# Patient Record
Sex: FEMALE | Race: WHITE | NOT HISPANIC OR LATINO | Employment: OTHER | URBAN - METROPOLITAN AREA
[De-identification: names, ages, dates, MRNs, and addresses within clinical notes are randomized per-mention and may not be internally consistent; named-entity substitution may affect disease eponyms.]

---

## 2017-02-24 ENCOUNTER — APPOINTMENT (OUTPATIENT)
Dept: LAB | Facility: CLINIC | Age: 82
End: 2017-02-24
Payer: MEDICARE

## 2017-02-24 ENCOUNTER — TRANSCRIBE ORDERS (OUTPATIENT)
Dept: LAB | Facility: CLINIC | Age: 82
End: 2017-02-24

## 2017-02-24 DIAGNOSIS — I10 ESSENTIAL HYPERTENSION, MALIGNANT: Primary | ICD-10-CM

## 2017-02-24 LAB
ALBUMIN SERPL BCP-MCNC: 3.4 G/DL (ref 3.5–5)
ALP SERPL-CCNC: 83 U/L (ref 46–116)
ALT SERPL W P-5'-P-CCNC: 23 U/L (ref 12–78)
ANION GAP SERPL CALCULATED.3IONS-SCNC: 7 MMOL/L (ref 4–13)
AST SERPL W P-5'-P-CCNC: 17 U/L (ref 5–45)
BILIRUB SERPL-MCNC: 0.73 MG/DL (ref 0.2–1)
BUN SERPL-MCNC: 24 MG/DL (ref 5–25)
CALCIUM SERPL-MCNC: 9.9 MG/DL (ref 8.3–10.1)
CHLORIDE SERPL-SCNC: 107 MMOL/L (ref 100–108)
CHOLEST SERPL-MCNC: 142 MG/DL (ref 50–200)
CO2 SERPL-SCNC: 28 MMOL/L (ref 21–32)
CREAT SERPL-MCNC: 0.77 MG/DL (ref 0.6–1.3)
GFR SERPL CREATININE-BSD FRML MDRD: >60 ML/MIN/1.73SQ M
GLUCOSE SERPL-MCNC: 91 MG/DL (ref 65–140)
HDLC SERPL-MCNC: 66 MG/DL (ref 40–60)
LDLC SERPL CALC-MCNC: 63 MG/DL (ref 0–100)
POTASSIUM SERPL-SCNC: 3.8 MMOL/L (ref 3.5–5.3)
PROT SERPL-MCNC: 6.6 G/DL (ref 6.4–8.2)
SODIUM SERPL-SCNC: 142 MMOL/L (ref 136–145)
TRIGL SERPL-MCNC: 65 MG/DL

## 2017-02-24 PROCEDURE — 80061 LIPID PANEL: CPT | Performed by: INTERNAL MEDICINE

## 2017-02-24 PROCEDURE — 36415 COLL VENOUS BLD VENIPUNCTURE: CPT | Performed by: INTERNAL MEDICINE

## 2017-02-24 PROCEDURE — 80053 COMPREHEN METABOLIC PANEL: CPT | Performed by: INTERNAL MEDICINE

## 2017-03-09 ENCOUNTER — ALLSCRIPTS OFFICE VISIT (OUTPATIENT)
Dept: OTHER | Facility: OTHER | Age: 82
End: 2017-03-09

## 2017-03-09 ENCOUNTER — TRANSCRIBE ORDERS (OUTPATIENT)
Dept: ADMINISTRATIVE | Facility: HOSPITAL | Age: 82
End: 2017-03-09

## 2017-03-09 DIAGNOSIS — I48.91 ATRIAL FIBRILLATION, UNSPECIFIED TYPE (HCC): Primary | ICD-10-CM

## 2017-09-19 ENCOUNTER — GENERIC CONVERSION - ENCOUNTER (OUTPATIENT)
Dept: OTHER | Facility: OTHER | Age: 82
End: 2017-09-19

## 2017-09-19 ENCOUNTER — HOSPITAL ENCOUNTER (OUTPATIENT)
Dept: NON INVASIVE DIAGNOSTICS | Facility: HOSPITAL | Age: 82
Discharge: HOME | End: 2017-09-19
Attending: INTERNAL MEDICINE
Payer: MEDICARE

## 2017-09-19 DIAGNOSIS — I48.91 ATRIAL FIBRILLATION, UNSPECIFIED TYPE (HCC): ICD-10-CM

## 2017-09-19 PROCEDURE — 93225 XTRNL ECG REC<48 HRS REC: CPT

## 2017-09-19 PROCEDURE — 93226 XTRNL ECG REC<48 HR SCAN A/R: CPT

## 2017-09-27 ENCOUNTER — TRANSCRIBE ORDERS (OUTPATIENT)
Dept: ADMINISTRATIVE | Facility: HOSPITAL | Age: 82
End: 2017-09-27

## 2017-09-29 ENCOUNTER — TRANSCRIBE ORDERS (OUTPATIENT)
Dept: ADMINISTRATIVE | Facility: HOSPITAL | Age: 82
End: 2017-09-29

## 2017-09-29 ENCOUNTER — APPOINTMENT (OUTPATIENT)
Dept: LAB | Facility: HOSPITAL | Age: 82
End: 2017-09-29
Attending: INTERNAL MEDICINE
Payer: MEDICARE

## 2017-09-29 DIAGNOSIS — E78.2 MIXED HYPERLIPIDEMIA: Primary | ICD-10-CM

## 2017-09-29 LAB
ALBUMIN SERPL BCP-MCNC: 3.3 G/DL (ref 3.5–5)
ALP SERPL-CCNC: 82 U/L (ref 46–116)
ALT SERPL W P-5'-P-CCNC: 29 U/L (ref 12–78)
ANION GAP SERPL CALCULATED.3IONS-SCNC: 9 MMOL/L (ref 4–13)
AST SERPL W P-5'-P-CCNC: 26 U/L (ref 5–45)
BILIRUB SERPL-MCNC: 1 MG/DL (ref 0.2–1)
BUN SERPL-MCNC: 29 MG/DL (ref 5–25)
CALCIUM SERPL-MCNC: 9.8 MG/DL (ref 8.3–10.1)
CHLORIDE SERPL-SCNC: 105 MMOL/L (ref 100–108)
CHOLEST SERPL-MCNC: 130 MG/DL (ref 50–200)
CO2 SERPL-SCNC: 27 MMOL/L (ref 21–32)
CREAT SERPL-MCNC: 0.81 MG/DL (ref 0.6–1.3)
GFR SERPL CREATININE-BSD FRML MDRD: 68 ML/MIN/1.73SQ M
GLUCOSE P FAST SERPL-MCNC: 90 MG/DL (ref 65–99)
HDLC SERPL-MCNC: 65 MG/DL (ref 40–60)
LDLC SERPL CALC-MCNC: 49 MG/DL (ref 0–100)
POTASSIUM SERPL-SCNC: 3.2 MMOL/L (ref 3.5–5.3)
PROT SERPL-MCNC: 6.5 G/DL (ref 6.4–8.2)
SODIUM SERPL-SCNC: 141 MMOL/L (ref 136–145)
TRIGL SERPL-MCNC: 78 MG/DL

## 2017-09-29 PROCEDURE — 80053 COMPREHEN METABOLIC PANEL: CPT | Performed by: INTERNAL MEDICINE

## 2017-09-29 PROCEDURE — 80061 LIPID PANEL: CPT | Performed by: INTERNAL MEDICINE

## 2017-09-29 PROCEDURE — 36415 COLL VENOUS BLD VENIPUNCTURE: CPT | Performed by: INTERNAL MEDICINE

## 2017-10-05 ENCOUNTER — TRANSCRIBE ORDERS (OUTPATIENT)
Dept: ADMINISTRATIVE | Facility: HOSPITAL | Age: 82
End: 2017-10-05

## 2017-10-05 ENCOUNTER — ALLSCRIPTS OFFICE VISIT (OUTPATIENT)
Dept: OTHER | Facility: OTHER | Age: 82
End: 2017-10-05

## 2017-10-05 ENCOUNTER — GENERIC CONVERSION - ENCOUNTER (OUTPATIENT)
Dept: OTHER | Facility: OTHER | Age: 82
End: 2017-10-05

## 2017-10-05 DIAGNOSIS — I35.0 NODULAR CALCIFIC AORTIC VALVE STENOSIS: Primary | ICD-10-CM

## 2017-10-05 DIAGNOSIS — I48.91 ATRIAL FIBRILLATION, UNSPECIFIED TYPE (HCC): ICD-10-CM

## 2017-10-05 DIAGNOSIS — I10 ESSENTIAL HYPERTENSION, MALIGNANT: ICD-10-CM

## 2017-10-11 ENCOUNTER — GENERIC CONVERSION - ENCOUNTER (OUTPATIENT)
Dept: OTHER | Facility: OTHER | Age: 82
End: 2017-10-11

## 2017-10-11 ENCOUNTER — HOSPITAL ENCOUNTER (OUTPATIENT)
Dept: NON INVASIVE DIAGNOSTICS | Facility: HOSPITAL | Age: 82
Discharge: HOME | End: 2017-10-11
Attending: INTERNAL MEDICINE
Payer: MEDICARE

## 2017-10-11 DIAGNOSIS — I10 ESSENTIAL HYPERTENSION, MALIGNANT: ICD-10-CM

## 2017-10-11 DIAGNOSIS — I35.0 NODULAR CALCIFIC AORTIC VALVE STENOSIS: ICD-10-CM

## 2017-10-11 DIAGNOSIS — I48.91 ATRIAL FIBRILLATION, UNSPECIFIED TYPE (HCC): ICD-10-CM

## 2017-10-11 PROCEDURE — 93306 TTE W/DOPPLER COMPLETE: CPT

## 2017-10-18 ENCOUNTER — GENERIC CONVERSION - ENCOUNTER (OUTPATIENT)
Dept: OTHER | Facility: OTHER | Age: 82
End: 2017-10-18

## 2017-10-19 ENCOUNTER — GENERIC CONVERSION - ENCOUNTER (OUTPATIENT)
Dept: OTHER | Facility: OTHER | Age: 82
End: 2017-10-19

## 2017-11-30 ENCOUNTER — ALLSCRIPTS OFFICE VISIT (OUTPATIENT)
Dept: OTHER | Facility: OTHER | Age: 82
End: 2017-11-30

## 2017-12-05 ENCOUNTER — TRANSCRIBE ORDERS (OUTPATIENT)
Dept: ADMINISTRATIVE | Facility: HOSPITAL | Age: 82
End: 2017-12-05

## 2017-12-05 DIAGNOSIS — R41.3 MEMORY LOSS: Primary | ICD-10-CM

## 2017-12-05 NOTE — PROGRESS NOTES
Assessment    1  Benign essential hypertension (401 1) (I10)   2  Hyperlipidemia, mixed (272 2) (E78 2)   3  Memory loss (780 93) (R41 3)    Plan  Benign essential hypertension    · AmLODIPine Besylate 5 MG Oral Tablet; Take 1 tablet daily   · Lisinopril-Hydrochlorothiazide 20-12 5 MG Oral Tablet; take 1 tablet twice a day   · Metoprolol Tartrate 25 MG Oral Tablet; take 1 tablet twice a day  Hyperlipidemia, mixed    · Atorvastatin Calcium 20 MG Oral Tablet (Lipitor); Take 1 tablet daily  Memory loss    · Eduard - Christiano Méndez PSYD  Neuropsychology Co-Management  *  Status: Need  Information - Financial Authorization  Requested for: [id number]  Care Summary provided  : Yes   · * MRI BRAIN WO CONTRAST; Status:Need Information - Financial Authorization; Requested for:[id number];    · Follow-up visit in 3 months Evaluation and Treatment  Follow-up  Status: Complete -  Scheduling  Done: 07IYW5345 03:34PM    Discussion/Summary    See MMSE scanned in chart  Scored 27/30 consistent with mild cognitive impairment  Discussed medication but she defers at this time  Will check MRI  Referred to neuropsych for memory testing  Advised increased physical activity and word games/reading etc   Advised strongly to stop driving as she is having to leave notes for herself in her car to tell her where she is trying to go  She was understandably upset about this  Follow up in 3 months  Chief Complaint  Memory issues, niece at visit      History of Present Illness  Here for follow up and for memory issues  Here with her niece  States she is having periods of forgetfulness and confusion  Confusion is worse as the day goes on  She has been continuing to drive and has forgotten where she is going when she went to [facility]  States she now leaves a note on her table to say where she went and puts a note on her carseat to remind her where she is going   Has assistance with cleaning but still doing her own check/bill paying and cooking, other ADL's  Is very active with seniors groups and is upset about the possibility of not being able to drive  The patient states her hyperlipidemia has been under good control since the last visit  She has no significant interval events  Symptoms: The patient is currently asymptomatic  Associated symptoms include memory loss  Medications: the patient is adherent with her medication regimen  She denies medication side effects  The patient is doing well with her hyperlipidemia goals  The patient presents for follow-up of essential hypertension  The patient states she has been doing well with her blood pressure control since the last visit  She has no comorbid illnesses  She has no significant interval events  Symptoms: The patient is currently asymptomatic  Medications: the patient is adherent with her medication regimen  (but has only been taking lisinopril HCT once per day instead of twice a day as ordered) She denies medication side effects  Disease Management: the patient is doing well with her blood pressure goals  Review of Systems    Constitutional: No fever, no chills, feels well, no tiredness, no recent weight gain or weight loss  Cardiovascular: No complaints of slow heart rate, no fast heart rate, no chest pain, no palpitations, no leg claudication, no lower extremity edema  Respiratory: No complaints of shortness of breath, no wheezing, no cough, no SOB on exertion, no orthopnea, no PND  Neurological: as noted in HPI  Active Problems    1  Adult BMI 30 0-30 9 kg/sq m (V85 30) (Z68 30)   2  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)   3  Aortic stenosis, mild (424 1) (I35 0)   4  Atrial fibrillation (427 31) (I48 91)   5  Benign essential hypertension (401 1) (I10)   6  Encounter for screening for cardiovascular disorders (V81 2) (Z13 6)   7  Endometrial hyperplasia (621 30) (N85 00)   8  Hyperlipidemia, mixed (272 2) (E78 2)   9  Hypokalemia (276 8) (E87 6)   10   Kidney carcinoma (189 0) (C64 9)   11  Kidney cysts (753 10) (N28 1)   12  Need for immunization against influenza (V04 81) (Z23)   13  Obesity, 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) (E66 9)   14  Screening for diabetes mellitus (DM) (V77 1) (Z13 1)   15  Screening for neurological condition (V80 09) (Z13 89)   16  Sick sinus syndrome (427 81) (I49 5)   17  Sinus bradycardia (427 89) (R00 1)    Past Medical History    1  Acute bronchitis (466 0) (J20 9)   2  History of urinary tract infection (V13 02) (Z87 440)   3  Screening for genitourinary condition (V81 6) (Z13 89)   4  History of Screening for genitourinary condition (V81 6) (Z13 89)    The active problems and past medical history were reviewed and updated today  Surgical History    1  History of Anterior Gastropexy For Hiatal Hernia   2  History of Appendectomy   3  History of Cataract Surgery   4  History of Cataract Surgery   5  History of Cryosurgical Ablation Of Renal Mass Lesion(S)   6  History of Tonsillectomy    The surgical history was reviewed and updated today  Family History  Mother    1  Family history of Dementia  Brother    2  Family history of Arteriosclerotic Cardiovascular Disease (ASCVD)    The family history was reviewed and updated today  Social History    · Never a smoker   · No alcohol use  The social history was reviewed and updated today  The social history was reviewed and is unchanged  Current Meds   1  AmLODIPine Besylate 5 MG Oral Tablet; Take 1 tablet daily; Therapy: 60ATK5375 to (Jerrica Toledo)  Requested for: 09UWP8147; Last   Rx:27Nov2017 Ordered   2  Atorvastatin Calcium 20 MG Oral Tablet; Take 1 tablet daily  Requested for: 93XKO3285;   Last Rx:09Mar2017 Ordered   3  Eliquis 5 MG Oral Tablet; one in am and one in pm  Requested for: 37Foj8405; Last   Rx:17Nov2017 Ordered   4  Klor-Con M20 20 MEQ Oral Tablet Extended Release; TAKE 1 TABLET DAILY;    Therapy: 04CXS9730 to ([address] )  Requested for: 44NVQ7451; Last Rx: 59RVS8948 Ordered   5  Lisinopril-Hydrochlorothiazide 20-12 5 MG Oral Tablet; TAKE 1 TABLET TWICE A DAY  NEEDS APPOINTMENT; Therapy: 29UBW2865 to (Evaluate:29Xji6496)  Requested for: 30GSC5589; Last   Rx:03Lmo7657 Ordered   6  Metoprolol Tartrate 25 MG Oral Tablet; take 1 tablet twice a day; Therapy: 26NIZ1709 to (Evaluate:17Mar2018)  Requested for: 22Mar2017; Last   Rx:22Mar2017 Ordered    The medication list was reviewed and updated today  Allergies    1  No Known Drug Allergies    Vitals  Vital Signs    Recorded: 09HZA5123 02:34PM   Temperature 96 6 F   Heart Rate 78   Respiration 16   Systolic 578   Diastolic 90   Height 5 ft 4 5 in   Weight 167 lb    BMI Calculated 28 22   BSA Calculated 1 82     Physical Exam    Constitutional   General appearance: No acute distress, well appearing and well nourished  Ears, Nose, Mouth, and Throat   Oropharynx: Normal with no erythema, edema, exudate or lesions  Pulmonary   Respiratory effort: No increased work of breathing or signs of respiratory distress  Auscultation of lungs: Clear to auscultation  Cardiovascular   Auscultation of heart: Abnormal   The rhythm was irregularly irregular  Examination of extremities for edema and/or varicosities: Abnormal   varicosities noted bilaterally  Carotid pulses: Normal     Abdomen   Abdomen: Non-tender, no masses  Liver and spleen: No hepatomegaly or splenomegaly  Lymphatic   Palpation of lymph nodes in neck: No lymphadenopathy  Results/Data  Falls Risk Assessment (Dx Z13 89 Screen for Neurologic Disorder) 45GHB2399 02:37PM User, [place]     Test Name Result Flag Reference   Falls Risk      No falls in the past year     PHQ-2 Adult Depression Screening 01YPM9165 02:37PM User, Ahs     Test Name Result Flag Reference   PHQ-2 Adult Depression Score 0     Over the last two weeks, how often have you been bothered by any of the following problems?   Little interest or pleasure in doing things: Not at all - 0  Feeling down, depressed, or hopeless: Not at all - 0   PHQ-2 Adult Depression Screening Negative         Future Appointments    Date/Time Provider Specialty Site   03/01/2018 12:30 PM WIN Henderson   [address] [place] [address]   Electronically signed by : WIN Gomez ; Nov 30 2017  3:50PM EST                       (Author)

## 2017-12-13 ENCOUNTER — HOSPITAL ENCOUNTER (OUTPATIENT)
Dept: RADIOLOGY | Facility: HOSPITAL | Age: 82
Discharge: HOME | End: 2017-12-13
Attending: INTERNAL MEDICINE
Payer: MEDICARE

## 2017-12-13 ENCOUNTER — GENERIC CONVERSION - ENCOUNTER (OUTPATIENT)
Dept: OTHER | Facility: OTHER | Age: 82
End: 2017-12-13

## 2017-12-13 DIAGNOSIS — R41.3 MEMORY LOSS: ICD-10-CM

## 2017-12-13 PROCEDURE — 70551 MRI BRAIN STEM W/O DYE: CPT

## 2017-12-14 ENCOUNTER — GENERIC CONVERSION - ENCOUNTER (OUTPATIENT)
Dept: OTHER | Facility: OTHER | Age: 82
End: 2017-12-14

## 2018-01-09 NOTE — PROCEDURES
Procedures by Garrick Ribeiro DO at 2017  11:59 PM      Author:  Garrick Ribeiro DO Service:  Cardiology Author Type:  Physician    Filed:  2017  9:22 AM Date of Service:  2017 11:59 PM Status:  Signed    :  Garrick Ribeiro DO (Physician)        Pre-procedure Diagnoses:       1  Shortness of breath [R06 02]                Procedures:       1  HOLTER MONITOR - 24 HOUR [CAR02 (Custom)]                 PROCEDURE: Holter monitor - 24 hour      PT NAME: César Jacobsen  : 1935  AGE: 80 y o  GENDER: female  MRN: [id number]      INDICATIONS:   24 hour Holter monitor was performed 2017 for evaluation of shortness of breath  PROCEDURE:    The patient was in atrial fibrillation for the duration of the study with an average heart rate of 82 beats per minute with a minimum heart rate of 43 beats per minute and maximal heart rate of 169 beats per minute  Patient was noted to be tachycardic  from approximately 5:45 a m  to 7:00 a m on 2nd day of monitoring  Ventricular ectopic activity consisted of 0 total beats, which comprises 0% of total beats  There were no significant events recorded in the patient's home diary  IMPRESSION:  1  Abnormal 24 hour holter tracing report  Patient was in atrial fibrillation for the duration of the study with an average ventricular rate  of 82 beats per minute  With episode of tachycardia 6-7 am on day 2 of monitoring  2  There were no episodes of Ventricular ectopic activity  3  There were no significant events recorded in patient's home diary      Interpreted by: Garrick Ribeiro [facility], [facility]             Received for:Salinas Cruz DO  Sep 27 2017  3:32PM [facility] Standard Time

## 2018-01-12 VITALS
WEIGHT: 167 LBS | HEIGHT: 65 IN | HEART RATE: 78 BPM | DIASTOLIC BLOOD PRESSURE: 90 MMHG | BODY MASS INDEX: 27.82 KG/M2 | TEMPERATURE: 96.6 F | SYSTOLIC BLOOD PRESSURE: 140 MMHG | RESPIRATION RATE: 16 BRPM

## 2018-01-12 NOTE — PROGRESS NOTES
Assessment    1  Benign essential hypertension (401 1) (I10)   2  Hyperlipidemia, mixed (272 2) (E78 2)   3  Memory loss (780 93) (R41 3)   4  Encounter for preventive health examination (V70 0) (Z00 00)    Plan  Benign essential hypertension    · AmLODIPine Besylate 5 MG Oral Tablet; Take 1 tablet daily   · Lisinopril-Hydrochlorothiazide 20-12 5 MG Oral Tablet; take 1 tablet twice a day   · Metoprolol Tartrate 25 MG Oral Tablet; take 1 tablet twice a day  Hyperlipidemia, mixed    · Atorvastatin Calcium 20 MG Oral Tablet (Lipitor); Take 1 tablet daily  Memory loss    · 1 - Christiano Méndez PSYD  Neuropsychology Co-Management  *  Status: Need  Information - Financial Authorization  Requested for: [id number]  Care Summary provided  : Yes   · * MRI BRAIN WO CONTRAST; Status:Need Information - Financial Authorization; Requested for:[id number];    · Follow-up visit in 3 months Evaluation and Treatment  Follow-up  Status: Complete -  Scheduling  Done: 75ETD4882 03:34PM    Discussion/Summary    PPP given  Impression: Subsequent Annual Wellness Visit, with preventive exam as well as age and risk appropriate counseling completed  Cardiovascular screening and counseling: the risks and benefits of screening were discussed, screening is current, counseling was given on maintaining a healthy diet, counseling was given on maintaining a healthy weight, counseling was given on ways to improve cholesterol and counseling was given on ways to improve blood pressure  Diabetes screening and counseling: the risks and benefits of screening were discussed and screening is current  Colorectal cancer screening and counseling: the risks and benefits of screening were discussed, screening not indicated and counseling was given on ways to eat a high fiber diet  Breast cancer screening and counseling: screening not indicated     Osteoporosis screening and counseling: the patient declines screening, counseling was given on obtaining adequate amounts of calcium and vitamin D on a daily basis and counseling was given on the importance of regular weightbearing exercise  Abdominal aortic aneurysm screening and counseling: screening not indicated  Glaucoma screening and counseling: screening is current  HIV screening and counseling: screening not indicated  Immunizations: influenza vaccine is up to date this year, the patient declines the pneumococcal vaccination, hepatitis B vaccination series is not indicated at this time due to the patient's low risk of deisy the disease, the patient declines the Zostavax vaccine and the patient declines the Td vaccine  Advance Directive Planning: complete and up to date  Patient Discussion: plan discussed with the patient, follow-up visit needed in one year  Chief Complaint  AWV      Advance Directives  Advance Directive [facility]:   The patient is not in agreement to receive the Advance Care Planning service    YES - Patient has an advance health care directive  in patient's home  History of Present Illness  The patient is being seen for the subsequent annual wellness visit  Medicare Screening and Risk Factors   Hospitalizations: no previous hospitalizations  Medicare Screening Tests Risk Questions   Drug and Alcohol Use: The patient is a former cigarette smoker and quit smoking 30 years ago  The patient reports never drinking alcohol  She has never used illicit drugs  Diet and Physical Activity: Current diet includes well balanced meals  The patient does not exercise  Mood Disorder and Cognitive Impairment Screening: She denies feeling down, depressed, or hopeless over the past two weeks  She denies feeling little interest or pleasure in doing things over the past two weeks  Functional Ability/Level of Safety: Hearing is significantly decreased  She reports hearing difficulties  She uses a hearing aid   The patient is currently able to do activities of daily living without limitations  Activities of daily living details: transportation help needed, needs help shopping, needs help doing housework, needs help doing laundry and needs help managing medications, but does not need help using the phone, no meal preparation help needed and does not need help managing money  Fall risk factors: The patient fell 0 times in the past 12 months  Advance Directives: Advance directives: living will and durable power of  for health care directives  Co-Managers and Medical Equipment/Suppliers: See Patient Care Team      Patient Care Team    Care Team Member Role Specialty Office Number   Gideon Curry [facility]  Neurology [phone]   Cindy Grade M D  Family Medicine [phone]   Vilma Severe M D  Cardiology [phone]     Active Problems    1  Adult BMI 30 0-30 9 kg/sq m (V85 30) (Z68 30)   2  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)   3  Aortic stenosis, mild (424 1) (I35 0)   4  Atrial fibrillation (427 31) (I48 91)   5  Benign essential hypertension (401 1) (I10)   6  Encounter for screening for cardiovascular disorders (V81 2) (Z13 6)   7  Endometrial hyperplasia (621 30) (N85 00)   8  Hyperlipidemia, mixed (272 2) (E78 2)   9  Hypokalemia (276 8) (E87 6)   10  Kidney carcinoma (189 0) (C64 9)   11  Kidney cysts (753 10) (N28 1)   12  Memory loss (780 93) (R41 3)   13  Need for immunization against influenza (V04 81) (Z23)   14  Obesity, 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) (E66 9)   15  Screening for diabetes mellitus (DM) (V77 1) (Z13 1)   16  Screening for neurological condition (V80 09) (Z13 89)   17  Sick sinus syndrome (427 81) (I49 5)   18  Sinus bradycardia (427 89) (R00 1)    Past Medical History    1  Acute bronchitis (466 0) (J20 9)   2  History of urinary tract infection (V13 02) (Z87 440)   3  Screening for genitourinary condition (V81 6) (Z13 89)   4   History of Screening for genitourinary condition (V81 6) (Z13 89)    The active problems and past medical history were reviewed and updated today  Surgical History    1  History of Anterior Gastropexy For Hiatal Hernia   2  History of Appendectomy   3  History of Cataract Surgery   4  History of Cataract Surgery   5  History of Cryosurgical Ablation Of Renal Mass Lesion(S)   6  History of Tonsillectomy    The surgical history was reviewed and updated today  Family History  Mother    1  Family history of Dementia  Brother    2  Family history of Arteriosclerotic Cardiovascular Disease (ASCVD)    The family history was reviewed and updated today  Social History    · Never a smoker   · No alcohol use  The social history was reviewed and updated today  The social history was reviewed and is unchanged  Current Meds   1  AmLODIPine Besylate 5 MG Oral Tablet; Take 1 tablet daily; Therapy: 52XXU9017 to (Theta Paola)  Requested for: 13JBY2171; Last   Rx:27Nov2017 Ordered   2  Atorvastatin Calcium 20 MG Oral Tablet; Take 1 tablet daily  Requested for: 83AOS5547;   Last Rx:09Mar2017 Ordered   3  Eliquis 5 MG Oral Tablet; one in am and one in pm  Requested for: 23Nov2017; Last   Rx:17Nov2017 Ordered   4  Klor-Con M20 20 MEQ Oral Tablet Extended Release; TAKE 1 TABLET DAILY; Therapy: 05TSB2435 to ([address] )  Requested for: 87CUN5518; Last   Rx:10Uic0051 Ordered   5  Lisinopril-Hydrochlorothiazide 20-12 5 MG Oral Tablet; TAKE 1 TABLET TWICE A DAY  NEEDS APPOINTMENT; Therapy: 66ZLE8194 to (Evaluate:59Iwo6657)  Requested for: 19LGF7588; Last   Rx:12Nov2017 Ordered   6  Metoprolol Tartrate 25 MG Oral Tablet; take 1 tablet twice a day; Therapy: 70TBH2647 to (Evaluate:17Mar2018)  Requested for: 22Mar2017; Last   Rx:22Mar2017 Ordered    The medication list was reviewed and updated today  Allergies    1   No Known Drug Allergies    Immunizations  Influenza --- [place]: [id number] Drop: [id number] Safe: 23-Sep-2015; Series4:  16-Sep-2016; Series5: 16-Aug-2017   PPSV --- [place]: Temporarily Deferred: Pt refuses     Vitals  Signs   Recorded: 49QWE9436 02:34PM   Temperature: 96 6 F  Heart Rate: 78  Respiration: 16  Systolic: 319  Diastolic: 90  Height: 5 ft 4 5 in  Weight: 167 lb   BMI Calculated: 28 22  BSA Calculated: 1 82    Results/Data  Falls Risk Assessment (Dx Z13 89 Screen for Neurologic Disorder) 42YPR9746 02:37PM User, Ahs     Test Name Result Flag Reference   Falls Risk      No falls in the past year     PHQ-2 Adult Depression Screening 47EAO4958 02:37PM User, Ahs     Test Name Result Flag Reference   PHQ-2 Adult Depression Score 0     Over the last two weeks, how often have you been bothered by any of the following problems? Little interest or pleasure in doing things: Not at all - 0  Feeling down, depressed, or hopeless: Not at all - 0   PHQ-2 Adult Depression Screening Negative         Future Appointments    Date/Time Provider Specialty Site   03/01/2018 12:30 PM WIN Banks   [address] [place] [address]     Signatures   Electronically signed by : IWN Frausto ; Nov 30 2017  5:26PM EST                       (Author)

## 2018-01-16 NOTE — RESULT NOTES
Verified Results  [address] [facility] [id number] 12:57PM Gunnar Duffy Order Number: [id number]    - Patient Instructions: To schedule this appointment, please contact Central Scheduling at [phone]  Test Name Result Flag Reference   US PELVIS  TRANSABDOMINAL ONLY (Report)     PELVIC ULTRASOUND, COMPLETE     INDICATION: 20-year-old female with suspected endometrial hyperplasia  Patient is postmenopausal      COMPARISON: CTs abdomen and pelvis 10/5/2016, 9/16/2015, 8/14/2014  TECHNIQUE:  Transabdominal pelvic ultrasound was performed in sagittal and transverse planes with a curvilinear transducer  The patient could not tolerate transvaginal imaging  Included volumetric sweeps as well as traditional still imaging technique  FINDINGS:     UTERUS:   The uterus is retroverted in position, measuring 6 4 x 3 3 x 4 6 cm  Contour and echotexture appear normal      The cervix shows no suspicious abnormality  ENDOMETRIUM:    The endometrium is poorly visualized with transabdominal imaging  Again, the patient cannot tolerate transvaginal imaging  However, with no postmenopausal bleeding and suspected endometrial hyperplasia stable since 2014, clinical surveillance    recommended  OVARIES/ADNEXA:   The ovaries are not visualized with transabdominal imaging  No suspicious adnexal mass or loculated collections  There is no free fluid  IMPRESSION:      Retroverted uterus  The endometrium is poorly visualized with transabdominal imaging and the patient could not tolerate transvaginal imaging  With no postmenopausal bleeding and no interval change in the appearance of the endometrium since 2014, clinical surveillance    recommended  Nonvisualized ovaries with no suspicious adnexal masses            Workstation performed: [id number]     Signed by:   Taj Franz MD   12/6/16

## 2018-01-17 NOTE — RESULT NOTES
Verified Results  [address] [facility] Ephraim Gutierrez [id number] 12:57PM Edinson Coker Order Number: [id number]   Performing Comments: copies Dr Jovani Castrejon   - Patient Instructions: To schedule this appointment, please contact Central Scheduling at [phone]  Test Name Result Flag Reference   US KIDNEY AND BLADDER (Report)     RENAL ULTRASOUND     INDICATION: 80-year-old with history of left cryoablation therapy and enlarging left upper pole lesion  COMPARISON: CT abdomen and pelvis 10/5/2016  TECHNIQUE:  Ultrasound of the retroperitoneum was performed with a curvilinear transducer utilizing volumetric sweeps and still imaging techniques  FINDINGS:     KIDNEYS:   Symmetric and normal size  Right kidney: 11 3 x 5 2 cm  Normal echogenicity and contour  No suspicious masses detected  A tiny upper pole cyst measures 7 mm as does a mid pole cyst    No hydronephrosis  No shadowing calculi  No perinephric fluid collections  Left kidney: 11 0 x 5 0 cm  Normal echogenicity and contour  No suspicious masses detected  A simple upper pole anechoic cyst measures 2 2 x 2 5 x 2 1 cm which corresponds to the CT finding  A 1 3 cm lower pole cyst is also noted  No hydronephrosis  No shadowing calculi  No perinephric fluid collections  URETERS:   Nonvisualized  BLADDER:    Normally distended  No focal thickening or mass lesions  IMPRESSION:       1  2 2 x 2 5 x 2 1 cm simple left upper pole cyst which corresponds to the CT finding  2  No solid mass lesions, including in the left mid to lower pole at the site of cryoablation therapy  3  No hydronephrosis         Workstation performed: [id number]     Signed by:   Mickey Solorzano MD   12/7/16       Discussion/Summary   J Luis Sagastume,   Kidney ultrasound shows cyst   Please follow up with Urologist    Dr Mohinder Rao

## 2018-01-17 NOTE — RESULT NOTES
Discussion/Summary   Nothing serious on echo  Followup as per my last note  Verified Results  ECHO COMPLETE WITH CONTRAST IF INDICATED [id number] 09:54AM [facility]     Test Name Result Flag Reference   ECHO COMPLETE WITH CONTRAST IF INDICATED (Report)     [place] [id number] [facility]   Mike [address]   [phone]     Transthoracic Echocardiogram   2D, M-mode, Doppler, and Color Doppler     Study date: 11-Oct-2017     Patient: Armida Alanis   MR number: [id number]   Account number: [de-identified]   : 1935   Age: 80 years   Gender: Female   Status: Routine   Location: Echo lab   Height: 64 in   Weight: 159 7 lb   BP: 134/ 82 mmHg     Indications: Aortic Stenosis     Diagnoses: 424 1 - AORTIC VALVE DISORDER     Sonographer: Alfred Worley   Primary Physician: Parth Smith MD   Referring Physician: Didier Noriega MD   Group: Deborah Mann   Interpreting Physician: Misti Stevens MD     SUMMARY     LEFT VENTRICLE:   Systolic function was normal  Ejection fraction was estimated in the range of 55 % to 60 % to be 60 %  Although no diagnostic regional wall motion abnormality was identified, this possibility cannot be completely excluded on the basis of this study  Wall thickness was mildly increased  There was mild concentric hypertrophy  The study was not technically sufficient to allow evaluation of LV diastolic function due to atrial fib  LEFT ATRIUM:   The atrium was markedly dilated  RIGHT ATRIUM:   The atrium was moderately dilated  MITRAL VALVE:   There was mild annular calcification  There was mild regurgitation  AORTIC VALVE:   The valve was trileaflet  Leaflets exhibited mildly to moderately increased thickness and mild to moderate calcification  Mainly RCS and NCS   There was Mild to moderate stenosis  With GRAYSON 1 3 cm2 and peak velocity of 2 5 m/sec   There was mild regurgitation       TRICUSPID VALVE:   There was mild regurgitation  Estimated peak PA pressure was 35 mmHg  PULMONIC VALVE:   There was mild regurgitation  IVC, HEPATIC VEINS:   The respirophasic change in diameter was more than 50%  HISTORY: PRIOR HISTORY: HTN, HLD, A-Fib, Renal Cancer     PROCEDURE: The procedure was performed in the echo lab  This was a routine study  The transthoracic approach was used  The study included complete 2D imaging, M-mode, complete spectral Doppler, and color Doppler  The heart rate was 84 bpm,   at the start of the study  Image quality was adequate  LEFT VENTRICLE: Size was normal  Systolic function was normal  Ejection fraction was estimated in the range of 55 % to 60 % to be 60 %  Although no diagnostic regional wall motion abnormality was identified, this possibility cannot be   completely excluded on the basis of this study  Wall thickness was mildly increased  There was mild concentric hypertrophy  DOPPLER: The study was not technically sufficient to allow evaluation of LV diastolic function due to atrial fib  There was no evidence of elevated ventricular filling pressure by Doppler parameters  RIGHT VENTRICLE: The size was normal  Systolic function was normal      LEFT ATRIUM: The atrium was markedly dilated  RIGHT ATRIUM: The atrium was moderately dilated  MITRAL VALVE: There was mild annular calcification  There was mild thickening  DOPPLER: There was no evidence for stenosis  There was mild regurgitation  AORTIC VALVE: The valve was trileaflet  Leaflets exhibited mildly to moderately increased thickness and mild to moderate calcification  Mainly RCS and NCS DOPPLER: There was Mild to moderate stenosis  With GRAYSON 1 3 cm2 and peak velocity of   2 5 m/sec There was mild regurgitation  TRICUSPID VALVE: DOPPLER: There was mild regurgitation  Estimated peak PA pressure was 35 mmHg  PULMONIC VALVE: DOPPLER: There was mild regurgitation       PERICARDIUM: There was no thickening or calcification  There was no pericardial effusion  AORTA: The root exhibited normal size  SYSTEMIC VEINS: IVC: The inferior vena cava was normal in size  The respirophasic change in diameter was more than 50%  SYSTEM MEASUREMENT TABLES     2D mode   AoR Diam 2D: 3 cm   LA Diam (2D): 5 5 cm   LA/Ao (2D): 1 83   FS (2D Teich): 27 9 %   IVSd (2D): 1 28 cm   LVDEV: 75 1 cmï¾³   LVESV: 34 2 cmï¾³   LVIDd(2D): 4 12 cm   LVISd (2D): 2 97 cm   LVOT Area 2D: 3 14 cm squared   LVPWd (2D): 1 21 cm   SV (Teich): 40 9 cmï¾³     Apical four chamber   LVEF A4C: 60 %     Unspecified Scan Mode   GRAYSON Cont Eq (Peak Zoltan): 1 28 cm squared   GRAYSON Cont Eq (VTI): 1 01 cm squared   LVOT (VTI): 16 8 cm   LVOT Diam : 2 cm   LVOT Vmax: 911 mm/s   LVOT Vmax; Mean: 911 mm/s   Peak Grad ; Mean: 3 mm[Hg]   SV (LVOT): 53 cmï¾³   VTI;Mean: 1 mm[Hg]   MV Peak E Zoltan   Mean: 794 mm/s   MVA (PHT): 5 cm squared   PHT: 44 ms   Max P mm[Hg]   V Max: 2650 mm/s   Vmax: 2700 mm/s   RA Area: 21 1 cm squared   RA Volume: 63 6 cmï¾³   TAPSE: 1 6 cm     [employer] Accredited Echocardiography Laboratory     Prepared and electronically signed by     Juliet Quinonez MD   Signed 11-Oct-2017 14:01:47

## 2018-01-22 VITALS
DIASTOLIC BLOOD PRESSURE: 72 MMHG | WEIGHT: 169 LBS | SYSTOLIC BLOOD PRESSURE: 118 MMHG | OXYGEN SATURATION: 98 % | BODY MASS INDEX: 28.16 KG/M2 | HEART RATE: 92 BPM | HEIGHT: 65 IN

## 2018-01-23 NOTE — RESULT NOTES
Verified Results  * MRI BRAIN FOR MEMORY LOSS [id number] 10:38AM [facility]     Test Name Result Flag Reference   MRI BRAIN MEMORY WO CONTRAST (Report)     MRI BRAIN WITHOUT CONTRAST     INDICATION: Memory loss     COMPARISON:  None  TECHNIQUE: Sagittal T1, axial T2, axial FLAIR, axial T1, axial [place], coronal T2, and axial diffusion imaging  IMAGE QUALITY: Diagnostic  FINDINGS:     BRAIN PARENCHYMA: There is no discrete mass, mass effect or midline shift  There are foci of T2 and FLAIR signal abnormality in the periventricular and subcortical white matter which are typical for microvascular disease in patients of this age group  Brainstem and cerebellum demonstrate normal signal  There is no intracranial hemorrhage  There is no evidence of acute infarction and diffusion imaging is unremarkable  VENTRICLES: Prominent representing diffuse cerebral volume loss  SELLA AND PITUITARY GLAND: Normal      ORBITS: Normal      PARANASAL SINUSES: Normal      VASCULATURE: Evaluation of the major intracranial vasculature demonstrates appropriate flow voids  CALVARIUM AND SKULL BASE: Normal      EXTRACRANIAL SOFT TISSUES: Normal        IMPRESSION:     No acute intracranial abnormality, scattered chronic microangiopathic changes  Age-appropriate cerebral volume loss         Workstation performed: [id number]     Signed by:   Amina Dickinson DO   12/14/17

## 2018-02-23 DIAGNOSIS — E78.5 DYSLIPIDEMIA: Primary | ICD-10-CM

## 2018-02-23 RX ORDER — ATORVASTATIN CALCIUM 20 MG/1
1 TABLET, FILM COATED ORAL DAILY
COMMUNITY
End: 2018-02-23 | Stop reason: SDUPTHER

## 2018-02-23 RX ORDER — ATORVASTATIN CALCIUM 20 MG/1
20 TABLET, FILM COATED ORAL DAILY
Qty: 90 TABLET | Refills: 3 | Status: SHIPPED | OUTPATIENT
Start: 2018-02-23 | End: 2018-06-28 | Stop reason: SDUPTHER

## 2018-03-03 PROBLEM — I49.5 SICK SINUS SYNDROME (HCC): Status: ACTIVE | Noted: 2017-03-09

## 2018-03-03 PROBLEM — I35.0 AORTIC STENOSIS, MILD: Status: ACTIVE | Noted: 2017-03-09

## 2018-03-05 ENCOUNTER — TRANSCRIBE ORDERS (OUTPATIENT)
Dept: ADMINISTRATIVE | Facility: HOSPITAL | Age: 83
End: 2018-03-05

## 2018-03-05 ENCOUNTER — LAB (OUTPATIENT)
Dept: LAB | Facility: HOSPITAL | Age: 83
End: 2018-03-05
Attending: PSYCHIATRY & NEUROLOGY
Payer: MEDICARE

## 2018-03-05 DIAGNOSIS — R41.3 MEMORY LOSS: Primary | ICD-10-CM

## 2018-03-05 DIAGNOSIS — R41.3 MEMORY LOSS: ICD-10-CM

## 2018-03-05 LAB
FOLATE SERPL-MCNC: 16.7 NG/ML (ref 3.1–17.5)
TSH SERPL DL<=0.05 MIU/L-ACNC: 1.21 UIU/ML (ref 0.36–3.74)
VIT B12 SERPL-MCNC: 370 PG/ML (ref 100–900)

## 2018-03-05 PROCEDURE — 82746 ASSAY OF FOLIC ACID SERUM: CPT

## 2018-03-05 PROCEDURE — 86592 SYPHILIS TEST NON-TREP QUAL: CPT

## 2018-03-05 PROCEDURE — 36415 COLL VENOUS BLD VENIPUNCTURE: CPT

## 2018-03-05 PROCEDURE — 84443 ASSAY THYROID STIM HORMONE: CPT

## 2018-03-05 PROCEDURE — 82607 VITAMIN B-12: CPT

## 2018-03-06 LAB — RPR SER QL: NORMAL

## 2018-03-20 RX ORDER — LISINOPRIL AND HYDROCHLOROTHIAZIDE 20; 12.5 MG/1; MG/1
1 TABLET ORAL 2 TIMES DAILY
COMMUNITY
Start: 2011-10-05 | End: 2018-04-29 | Stop reason: SDUPTHER

## 2018-03-20 RX ORDER — AMLODIPINE BESYLATE 5 MG/1
1 TABLET ORAL DAILY
COMMUNITY
Start: 2015-11-18 | End: 2018-06-28 | Stop reason: SDUPTHER

## 2018-03-20 RX ORDER — POTASSIUM CHLORIDE 20 MEQ/1
1 TABLET, EXTENDED RELEASE ORAL DAILY
COMMUNITY
Start: 2017-10-05 | End: 2018-04-03 | Stop reason: SDUPTHER

## 2018-03-22 ENCOUNTER — TELEPHONE (OUTPATIENT)
Dept: FAMILY MEDICINE CLINIC | Facility: CLINIC | Age: 83
End: 2018-03-22

## 2018-03-22 ENCOUNTER — OFFICE VISIT (OUTPATIENT)
Dept: FAMILY MEDICINE CLINIC | Facility: CLINIC | Age: 83
End: 2018-03-22
Payer: MEDICARE

## 2018-03-22 VITALS
HEART RATE: 74 BPM | RESPIRATION RATE: 18 BRPM | BODY MASS INDEX: 26.99 KG/M2 | TEMPERATURE: 97.8 F | WEIGHT: 162 LBS | HEIGHT: 65 IN | SYSTOLIC BLOOD PRESSURE: 110 MMHG | DIASTOLIC BLOOD PRESSURE: 70 MMHG

## 2018-03-22 DIAGNOSIS — E78.2 HYPERLIPIDEMIA, MIXED: ICD-10-CM

## 2018-03-22 DIAGNOSIS — R41.3 MEMORY DEFICIT: ICD-10-CM

## 2018-03-22 DIAGNOSIS — I10 BENIGN ESSENTIAL HYPERTENSION: Primary | ICD-10-CM

## 2018-03-22 LAB
SL AMB  POCT GLUCOSE, UA: NORMAL
SL AMB LEUKOCYTE ESTERASE,UA: 75
SL AMB POCT BILIRUBIN,UA: NORMAL
SL AMB POCT BLOOD,UA: NORMAL
SL AMB POCT CLARITY,UA: CLEAR
SL AMB POCT COLOR,UA: YELLOW
SL AMB POCT KETONES,UA: NORMAL
SL AMB POCT NITRITE,UA: NORMAL
SL AMB POCT PH,UA: 5
SL AMB POCT SPECIFIC GRAVITY,UA: 1.02
SL AMB POCT URINE PROTEIN: NORMAL
SL AMB POCT UROBILINOGEN: NORMAL

## 2018-03-22 PROCEDURE — 81003 URINALYSIS AUTO W/O SCOPE: CPT | Performed by: INTERNAL MEDICINE

## 2018-03-22 PROCEDURE — 99214 OFFICE O/P EST MOD 30 MIN: CPT | Performed by: INTERNAL MEDICINE

## 2018-03-22 RX ORDER — LANOLIN ALCOHOL/MO/W.PET/CERES
1000 CREAM (GRAM) TOPICAL DAILY
COMMUNITY
End: 2018-06-28 | Stop reason: SDUPTHER

## 2018-03-22 RX ORDER — DONEPEZIL HYDROCHLORIDE 5 MG/1
10 TABLET, FILM COATED ORAL DAILY
Refills: 0 | COMMUNITY
Start: 2018-03-15 | End: 2018-06-28 | Stop reason: SDUPTHER

## 2018-03-22 NOTE — PROGRESS NOTES
Subjective:      Patient ID: Binta Santacruz is a 80 y o  female  Chief Complaint   Patient presents with    Follow-up     confusion and memory loss  elizabeth       Here for follow up visit  Feels well in general and recently saw Melquiades Soles for her memory loss  He put her on 5 mg of aricept  She is still driving despite being told not to because she gets lost   She is not having any side effects with the aricept  Niece is physically giving her medications to her because she was not taking them even with a pill box  Niece does her shopping  They are looking into assisted living for patient  Niece would like a UA because of the confusion, she is worried about possible UTI  The following portions of the patient's history were reviewed and updated as appropriate: allergies, current medications, past family history, past medical history, past social history, past surgical history and problem list     Review of Systems   Constitutional: Negative  Respiratory: Negative  Cardiovascular: Negative  Neurological:        Memory loss         Current Outpatient Prescriptions   Medication Sig Dispense Refill    amLODIPine (NORVASC) 5 mg tablet Take 1 tablet by mouth daily      apixaban (ELIQUIS) 5 mg Take 1 tablet by mouth 2 (two) times a day      atorvastatin (LIPITOR) 20 mg tablet Take 1 tablet (20 mg total) by mouth daily 90 tablet 3    cyanocobalamin (VITAMIN B-12) 1,000 mcg tablet Take 1,000 mcg by mouth daily      donepezil (ARICEPT) 5 mg tablet daily  0    lisinopril-hydrochlorothiazide (PRINZIDE,ZESTORETIC) 20-12 5 MG per tablet Take 1 tablet by mouth 2 (two) times a day      metoprolol tartrate (LOPRESSOR) 25 mg tablet Take 1 tablet by mouth 2 (two) times a day      potassium chloride (KLOR-CON M20) 20 mEq tablet Take 1 tablet by mouth daily       No current facility-administered medications for this visit          Objective:    /70   Pulse 74   Temp 97 8 °F (36 6 °C)   Resp 18 Ht 5' 4 5" (1 638 m)   Wt 73 5 kg (162 lb)   BMI 27 38 kg/m²        Physical Exam   Constitutional: She appears well-nourished  HENT:   Head: Normocephalic and atraumatic  Eyes: Conjunctivae are normal    Neck: Neck supple  No JVD present  No thyromegaly present  Cardiovascular: Normal rate, regular rhythm, normal heart sounds and intact distal pulses  Pulmonary/Chest: Effort normal and breath sounds normal  She has no wheezes  She has no rales  Abdominal: Soft  Bowel sounds are normal  She exhibits no distension  There is no tenderness  Assessment/Plan:    Benign essential hypertension  Stable, continue metoprolol, lisinopril, amlodipine  Memory deficit  She will continue her aricept and her neurology follow up  I recommended again that she stop driving completely  They are also looking into assisted living  UA unremarkable here  Hyperlipidemia, mixed  Stable, will check labs prior to next appointment  Continue statin  Diagnoses and all orders for this visit:    Benign essential hypertension    Hyperlipidemia, mixed  -     CBC and differential; Future  -     Comprehensive metabolic panel; Future  -     Lipid panel; Future    Memory deficit  -     POCT urine dip auto non-scope    Other orders  -     amLODIPine (NORVASC) 5 mg tablet; Take 1 tablet by mouth daily  -     potassium chloride (KLOR-CON M20) 20 mEq tablet; Take 1 tablet by mouth daily  -     apixaban (ELIQUIS) 5 mg; Take 1 tablet by mouth 2 (two) times a day  -     lisinopril-hydrochlorothiazide (PRINZIDE,ZESTORETIC) 20-12 5 MG per tablet; Take 1 tablet by mouth 2 (two) times a day  -     metoprolol tartrate (LOPRESSOR) 25 mg tablet; Take 1 tablet by mouth 2 (two) times a day  -     donepezil (ARICEPT) 5 mg tablet; daily  -     cyanocobalamin (VITAMIN B-12) 1,000 mcg tablet; Take 1,000 mcg by mouth daily          Return in about 3 months (around 6/22/2018)         Radha Gagnon MD

## 2018-03-22 NOTE — TELEPHONE ENCOUNTER
Patient was here today for a visit, niece was present  Patient continues to drive despite new dementia diagnosis and being told at last visit she should absolutely stop driving  She will have to keep a note on her seat so she remembers where she is going  She continues to drive  I wrote a letter to the [facility] to tell them I no longer think she should drive  Please fax to either [phone] or [phone], Medical Review Unit

## 2018-03-22 NOTE — ASSESSMENT & PLAN NOTE
She will continue her aricept and her neurology follow up  I recommended again that she stop driving completely  They are also looking into assisted living  UA unremarkable here

## 2018-03-23 NOTE — TELEPHONE ENCOUNTER
Confirmation fax received      The fax number that worked was [phone]    No further action needed

## 2018-04-03 ENCOUNTER — TELEPHONE (OUTPATIENT)
Dept: CARDIOLOGY CLINIC | Facility: CLINIC | Age: 83
End: 2018-04-03

## 2018-04-03 DIAGNOSIS — I10 ESSENTIAL HYPERTENSION: Primary | ICD-10-CM

## 2018-04-03 DIAGNOSIS — I49.5 SSS (SICK SINUS SYNDROME) (HCC): Primary | ICD-10-CM

## 2018-04-03 RX ORDER — POTASSIUM CHLORIDE 20 MEQ/1
20 TABLET, EXTENDED RELEASE ORAL DAILY
Qty: 30 TABLET | Refills: 5 | Status: SHIPPED | OUTPATIENT
Start: 2018-04-03 | End: 2018-06-28 | Stop reason: SDUPTHER

## 2018-04-03 RX ORDER — POTASSIUM CHLORIDE 20 MEQ/1
20 TABLET, EXTENDED RELEASE ORAL DAILY
Qty: 30 TABLET | Refills: 5 | Status: CANCELLED | OUTPATIENT
Start: 2018-04-03 | End: 2018-09-30

## 2018-04-26 ENCOUNTER — TELEPHONE (OUTPATIENT)
Dept: FAMILY MEDICINE CLINIC | Facility: CLINIC | Age: 83
End: 2018-04-26

## 2018-04-26 ENCOUNTER — TRANSCRIBE ORDERS (OUTPATIENT)
Dept: ADMINISTRATIVE | Facility: HOSPITAL | Age: 83
End: 2018-04-26

## 2018-04-26 ENCOUNTER — LAB (OUTPATIENT)
Dept: LAB | Facility: HOSPITAL | Age: 83
End: 2018-04-26
Attending: PSYCHIATRY & NEUROLOGY
Payer: MEDICARE

## 2018-04-26 DIAGNOSIS — F05 DEMENTIA OF THE ALZHEIMER'S TYPE, WITH LATE ONSET, WITH DELIRIUM (HCC): Primary | ICD-10-CM

## 2018-04-26 DIAGNOSIS — F02.80 DEMENTIA OF THE ALZHEIMER'S TYPE, WITH LATE ONSET, WITH DELIRIUM (HCC): ICD-10-CM

## 2018-04-26 DIAGNOSIS — G30.1 DEMENTIA OF THE ALZHEIMER'S TYPE, WITH LATE ONSET, WITH DELIRIUM (HCC): Primary | ICD-10-CM

## 2018-04-26 DIAGNOSIS — F05 DEMENTIA OF THE ALZHEIMER'S TYPE, WITH LATE ONSET, WITH DELIRIUM (HCC): ICD-10-CM

## 2018-04-26 DIAGNOSIS — G30.1 DEMENTIA OF THE ALZHEIMER'S TYPE, WITH LATE ONSET, WITH DELIRIUM (HCC): ICD-10-CM

## 2018-04-26 DIAGNOSIS — F02.80 DEMENTIA OF THE ALZHEIMER'S TYPE, WITH LATE ONSET, WITH DELIRIUM (HCC): Primary | ICD-10-CM

## 2018-04-26 LAB
ALBUMIN SERPL BCP-MCNC: 3.3 G/DL (ref 3.5–5)
ALP SERPL-CCNC: 75 U/L (ref 46–116)
ALT SERPL W P-5'-P-CCNC: 24 U/L (ref 12–78)
ANION GAP SERPL CALCULATED.3IONS-SCNC: 7 MMOL/L (ref 4–13)
AST SERPL W P-5'-P-CCNC: 18 U/L (ref 5–45)
BASOPHILS # BLD AUTO: 0 THOUSANDS/ΜL (ref 0–0.1)
BASOPHILS NFR BLD AUTO: 0 % (ref 0–1)
BILIRUB SERPL-MCNC: 0.8 MG/DL (ref 0.2–1)
BUN SERPL-MCNC: 17 MG/DL (ref 5–25)
CALCIUM SERPL-MCNC: 9.6 MG/DL (ref 8.3–10.1)
CHLORIDE SERPL-SCNC: 106 MMOL/L (ref 100–108)
CO2 SERPL-SCNC: 30 MMOL/L (ref 21–32)
CREAT SERPL-MCNC: 0.85 MG/DL (ref 0.6–1.3)
EOSINOPHIL # BLD AUTO: 0.1 THOUSAND/ΜL (ref 0–0.61)
EOSINOPHIL NFR BLD AUTO: 2 % (ref 0–6)
ERYTHROCYTE [DISTWIDTH] IN BLOOD BY AUTOMATED COUNT: 14.9 % (ref 11.6–15.1)
GFR SERPL CREATININE-BSD FRML MDRD: 64 ML/MIN/1.73SQ M
GLUCOSE SERPL-MCNC: 85 MG/DL (ref 65–140)
HCT VFR BLD AUTO: 43.3 % (ref 37–47)
HGB BLD-MCNC: 14.2 G/DL (ref 12–16)
LYMPHOCYTES # BLD AUTO: 1.9 THOUSANDS/ΜL (ref 0.6–4.47)
LYMPHOCYTES NFR BLD AUTO: 22 % (ref 14–44)
MCH RBC QN AUTO: 30.5 PG (ref 27–31)
MCHC RBC AUTO-ENTMCNC: 32.7 G/DL (ref 31.4–37.4)
MCV RBC AUTO: 93 FL (ref 82–98)
MONOCYTES # BLD AUTO: 0.8 THOUSAND/ΜL (ref 0.17–1.22)
MONOCYTES NFR BLD AUTO: 9 % (ref 4–12)
NEUTROPHILS # BLD AUTO: 5.8 THOUSANDS/ΜL (ref 1.85–7.62)
NEUTS SEG NFR BLD AUTO: 67 % (ref 43–75)
NRBC BLD AUTO-RTO: 0 /100 WBCS
PLATELET # BLD AUTO: 196 THOUSANDS/UL (ref 130–400)
PMV BLD AUTO: 9.6 FL (ref 8.9–12.7)
POTASSIUM SERPL-SCNC: 3.5 MMOL/L (ref 3.5–5.3)
PROT SERPL-MCNC: 6.7 G/DL (ref 6.4–8.2)
RBC # BLD AUTO: 4.64 MILLION/UL (ref 4.2–5.4)
SODIUM SERPL-SCNC: 143 MMOL/L (ref 136–145)
TSH SERPL DL<=0.05 MIU/L-ACNC: 1.23 UIU/ML (ref 0.36–3.74)
WBC # BLD AUTO: 8.7 THOUSAND/UL (ref 4.8–10.8)

## 2018-04-26 PROCEDURE — 85025 COMPLETE CBC W/AUTO DIFF WBC: CPT | Performed by: PSYCHIATRY & NEUROLOGY

## 2018-04-26 PROCEDURE — 84443 ASSAY THYROID STIM HORMONE: CPT

## 2018-04-26 PROCEDURE — 87086 URINE CULTURE/COLONY COUNT: CPT

## 2018-04-26 PROCEDURE — 36415 COLL VENOUS BLD VENIPUNCTURE: CPT | Performed by: PSYCHIATRY & NEUROLOGY

## 2018-04-26 PROCEDURE — 80053 COMPREHEN METABOLIC PANEL: CPT | Performed by: PSYCHIATRY & NEUROLOGY

## 2018-04-26 NOTE — TELEPHONE ENCOUNTER
FORM WAS DROPPED OFF FOR PT TO GET A HANDICAP PLACARD, WHEN COMPLETE PLEASE MAIL TO PT IN SELF ADDRESSED ENVELOPE PROVIDED  FORM IS AT THE NURSE'S DESK

## 2018-04-27 LAB — BACTERIA UR CULT: NORMAL

## 2018-04-29 DIAGNOSIS — I10 BENIGN ESSENTIAL HYPERTENSION: Primary | ICD-10-CM

## 2018-04-29 RX ORDER — LISINOPRIL AND HYDROCHLOROTHIAZIDE 20; 12.5 MG/1; MG/1
1 TABLET ORAL
Qty: 180 TABLET | Refills: 3 | Status: SHIPPED | OUTPATIENT
Start: 2018-04-29 | End: 2018-06-28 | Stop reason: SDUPTHER

## 2018-05-25 DIAGNOSIS — I48.20 ATRIAL FIBRILLATION, CHRONIC (HCC): Primary | ICD-10-CM

## 2018-05-29 RX ORDER — APIXABAN 5 MG/1
TABLET, FILM COATED ORAL
Qty: 60 TABLET | Refills: 5 | Status: SHIPPED | OUTPATIENT
Start: 2018-05-29 | End: 2018-06-28 | Stop reason: SDUPTHER

## 2018-05-29 NOTE — TELEPHONE ENCOUNTER
I renewed prescription refill request for Eliquis, but patient is way past due for her next appointment, which should have occurred in April  Please schedule appointment as per my October, 2017 note

## 2018-06-04 DIAGNOSIS — I10 BENIGN ESSENTIAL HYPERTENSION: Primary | ICD-10-CM

## 2018-06-04 DIAGNOSIS — I48.91 ATRIAL FIBRILLATION, UNSPECIFIED TYPE (HCC): ICD-10-CM

## 2018-06-27 NOTE — PROGRESS NOTES
Subjective:      Patient ID: Kathryn Gutierrez is a 80 y o  female  Chief Complaint   Patient presents with    Follow-up     3 month f/u  prcma       Now at [facility]  Is very active, has adjusted nicely per patient and her niece  She is not driving, but it is not as much of an issue anymore  She continues to see Dr Adeline Bennett for memory issues and he increased her donepezil to 10 mg, she is tolerating this well  Needs refills on all of her medications for her assisted living  The following portions of the patient's history were reviewed and updated as appropriate: allergies, current medications, past family history, past medical history, past social history, past surgical history and problem list     Review of Systems   Constitutional: Negative  Respiratory: Negative  Cardiovascular: Negative  Neurological:        Memory loss as above  Current Outpatient Prescriptions   Medication Sig Dispense Refill    amLODIPine (NORVASC) 5 mg tablet Take 1 tablet (5 mg total) by mouth daily 30 tablet 5    apixaban (ELIQUIS) 5 mg Take 1 tablet (5 mg total) by mouth 2 (two) times a day 60 tablet 6    atorvastatin (LIPITOR) 20 mg tablet Take 1 tablet (20 mg total) by mouth daily 30 tablet 5    cyanocobalamin (VITAMIN B-12) 1,000 mcg tablet Take 1 tablet (1,000 mcg total) by mouth daily 30 tablet 5    donepezil (ARICEPT) 10 mg tablet Take 1 tablet (10 mg total) by mouth daily at bedtime 30 tablet 5    lisinopril-hydrochlorothiazide (PRINZIDE,ZESTORETIC) 20-12 5 MG per tablet Take 1 tablet by mouth 2 (two) times a day 60 tablet 5    metoprolol tartrate (LOPRESSOR) 25 mg tablet Take 1 tablet (25 mg total) by mouth 2 (two) times a day 60 tablet 5    potassium chloride (KLOR-CON M20) 20 mEq tablet Take 1 tablet (20 mEq total) by mouth daily for 180 days 30 tablet 5     No current facility-administered medications for this visit          Objective:    /80   Pulse 80   Temp (!) 97 1 °F (36 2 °C)   Resp 18   Ht 5' 4 5" (1 638 m)   Wt 75 9 kg (167 lb 4 8 oz)   BMI 28 27 kg/m²        Physical Exam   Constitutional: She appears well-developed and well-nourished  Eyes: Conjunctivae are normal    Neck: Neck supple  No JVD present  No thyromegaly present  Cardiovascular: Normal rate, normal heart sounds and intact distal pulses  An irregularly irregular rhythm present  Exam reveals no gallop and no friction rub  No murmur heard  Pulmonary/Chest: Effort normal and breath sounds normal  She has no wheezes  She has no rales  Abdominal: Soft  Bowel sounds are normal  She exhibits no distension  There is no tenderness  Musculoskeletal: She exhibits no edema  Assessment/Plan:    Memory deficit  She is doing a bit better since moving to Assisted Living  Will continue donepezil  Encouraged continued physical and mental activity, as well as social activity  She states she is participating in many activities at her new home  Diagnoses and all orders for this visit:    Memory deficit  -     Discontinue: donepezil (ARICEPT) 5 mg tablet; Take 2 tablets (10 mg total) by mouth daily at bedtime  -     donepezil (ARICEPT) 10 mg tablet; Take 1 tablet (10 mg total) by mouth daily at bedtime  -     cyanocobalamin (VITAMIN B-12) 1,000 mcg tablet; Take 1 tablet (1,000 mcg total) by mouth daily    Hyperlipidemia, mixed  -     CBC and differential; Future  -     Comprehensive metabolic panel; Future  -     Lipid panel; Future    Dyslipidemia  -     atorvastatin (LIPITOR) 20 mg tablet; Take 1 tablet (20 mg total) by mouth daily    Atrial fibrillation, chronic (HCC)  -     apixaban (ELIQUIS) 5 mg; Take 1 tablet (5 mg total) by mouth 2 (two) times a day    Benign essential hypertension  -     amLODIPine (NORVASC) 5 mg tablet; Take 1 tablet (5 mg total) by mouth daily  -     lisinopril-hydrochlorothiazide (PRINZIDE,ZESTORETIC) 20-12 5 MG per tablet;  Take 1 tablet by mouth 2 (two) times a day  - metoprolol tartrate (LOPRESSOR) 25 mg tablet; Take 1 tablet (25 mg total) by mouth 2 (two) times a day    Atrial fibrillation, unspecified type (HCC)  -     metoprolol tartrate (LOPRESSOR) 25 mg tablet; Take 1 tablet (25 mg total) by mouth 2 (two) times a day    Essential hypertension  -     potassium chloride (KLOR-CON M20) 20 mEq tablet; Take 1 tablet (20 mEq total) by mouth daily for 180 days          Return in about 4 months (around 10/28/2018)         Nicki Carrasco MD

## 2018-06-28 ENCOUNTER — OFFICE VISIT (OUTPATIENT)
Dept: FAMILY MEDICINE CLINIC | Facility: CLINIC | Age: 83
End: 2018-06-28
Payer: MEDICARE

## 2018-06-28 VITALS
TEMPERATURE: 97.1 F | SYSTOLIC BLOOD PRESSURE: 110 MMHG | HEART RATE: 80 BPM | BODY MASS INDEX: 27.88 KG/M2 | WEIGHT: 167.3 LBS | HEIGHT: 65 IN | RESPIRATION RATE: 18 BRPM | DIASTOLIC BLOOD PRESSURE: 80 MMHG

## 2018-06-28 DIAGNOSIS — I48.91 ATRIAL FIBRILLATION, UNSPECIFIED TYPE (HCC): ICD-10-CM

## 2018-06-28 DIAGNOSIS — E78.2 HYPERLIPIDEMIA, MIXED: ICD-10-CM

## 2018-06-28 DIAGNOSIS — I10 BENIGN ESSENTIAL HYPERTENSION: ICD-10-CM

## 2018-06-28 DIAGNOSIS — I48.20 ATRIAL FIBRILLATION, CHRONIC (HCC): ICD-10-CM

## 2018-06-28 DIAGNOSIS — I10 ESSENTIAL HYPERTENSION: ICD-10-CM

## 2018-06-28 DIAGNOSIS — R41.3 MEMORY DEFICIT: Primary | ICD-10-CM

## 2018-06-28 DIAGNOSIS — E78.5 DYSLIPIDEMIA: ICD-10-CM

## 2018-06-28 PROCEDURE — 99213 OFFICE O/P EST LOW 20 MIN: CPT | Performed by: INTERNAL MEDICINE

## 2018-06-28 RX ORDER — ATORVASTATIN CALCIUM 20 MG/1
20 TABLET, FILM COATED ORAL DAILY
Qty: 30 TABLET | Refills: 5 | Status: SHIPPED | OUTPATIENT
Start: 2018-06-28

## 2018-06-28 RX ORDER — POTASSIUM CHLORIDE 20 MEQ/1
20 TABLET, EXTENDED RELEASE ORAL DAILY
Qty: 30 TABLET | Refills: 5 | Status: SHIPPED | OUTPATIENT
Start: 2018-06-28 | End: 2022-02-02

## 2018-06-28 RX ORDER — LISINOPRIL AND HYDROCHLOROTHIAZIDE 20; 12.5 MG/1; MG/1
1 TABLET ORAL
Qty: 60 TABLET | Refills: 5 | Status: SHIPPED | OUTPATIENT
Start: 2018-06-28 | End: 2022-05-27

## 2018-06-28 RX ORDER — AMLODIPINE BESYLATE 5 MG/1
5 TABLET ORAL DAILY
Qty: 30 TABLET | Refills: 5 | Status: SHIPPED | OUTPATIENT
Start: 2018-06-28 | End: 2018-08-07 | Stop reason: SDUPTHER

## 2018-06-28 RX ORDER — DONEPEZIL HYDROCHLORIDE 10 MG/1
10 TABLET, FILM COATED ORAL
Qty: 30 TABLET | Refills: 5 | Status: SHIPPED | OUTPATIENT
Start: 2018-06-28

## 2018-06-28 RX ORDER — DONEPEZIL HYDROCHLORIDE 5 MG/1
10 TABLET, FILM COATED ORAL
Refills: 0
Start: 2018-06-28 | End: 2018-06-28 | Stop reason: SDUPTHER

## 2018-06-28 RX ORDER — AMLODIPINE BESYLATE 5 MG/1
5 TABLET ORAL DAILY
Qty: 30 TABLET | Refills: 5 | Status: SHIPPED | OUTPATIENT
Start: 2018-06-28 | End: 2018-06-28 | Stop reason: SDUPTHER

## 2018-06-28 RX ORDER — LANOLIN ALCOHOL/MO/W.PET/CERES
1000 CREAM (GRAM) TOPICAL DAILY
Qty: 30 TABLET | Refills: 5 | Status: SHIPPED | OUTPATIENT
Start: 2018-06-28

## 2018-06-28 NOTE — ASSESSMENT & PLAN NOTE
She is doing a bit better since moving to Assisted Living  Will continue donepezil  Encouraged continued physical and mental activity, as well as social activity  She states she is participating in many activities at her new home

## 2018-08-07 DIAGNOSIS — I10 BENIGN ESSENTIAL HYPERTENSION: ICD-10-CM

## 2018-08-07 RX ORDER — AMLODIPINE BESYLATE 5 MG/1
TABLET ORAL
Qty: 90 TABLET | Refills: 3 | Status: SHIPPED | OUTPATIENT
Start: 2018-08-07 | End: 2022-02-06 | Stop reason: HOSPADM

## 2019-06-27 ENCOUNTER — TELEPHONE (OUTPATIENT)
Dept: FAMILY MEDICINE CLINIC | Facility: CLINIC | Age: 84
End: 2019-06-27

## 2019-06-27 NOTE — TELEPHONE ENCOUNTER
Received fax from pharmacy requesting a refill for pts lisinopril-hctz and atorvastatin  Pt has not been seen in the office since 6/28/2019  Pt will need an appointment   Wilder Ortez

## 2019-10-17 ENCOUNTER — TELEPHONE (OUTPATIENT)
Dept: FAMILY MEDICINE CLINIC | Facility: CLINIC | Age: 84
End: 2019-10-17

## 2019-10-17 NOTE — TELEPHONE ENCOUNTER
----- Message from Chelsea Tesfaye MD sent at 10/17/2019  1:41 PM EDT -----  Please call for follow up and AWV

## 2020-05-05 ENCOUNTER — TELEPHONE (OUTPATIENT)
Dept: FAMILY MEDICINE CLINIC | Facility: CLINIC | Age: 85
End: 2020-05-05

## 2021-01-25 DIAGNOSIS — N39.0 URINARY TRACT INFECTION WITHOUT HEMATURIA, SITE UNSPECIFIED: Primary | ICD-10-CM

## 2021-01-25 RX ORDER — SULFAMETHOXAZOLE AND TRIMETHOPRIM 800; 160 MG/1; MG/1
1 TABLET ORAL EVERY 12 HOURS SCHEDULED
Qty: 14 TABLET | Refills: 0 | Status: SHIPPED | OUTPATIENT
Start: 2021-01-25 | End: 2021-02-01

## 2021-02-04 ENCOUNTER — NURSING HOME VISIT (OUTPATIENT)
Dept: FAMILY MEDICINE CLINIC | Facility: CLINIC | Age: 86
End: 2021-02-04
Payer: MEDICARE

## 2021-02-04 DIAGNOSIS — M19.90 ARTHRITIS: Primary | ICD-10-CM

## 2021-02-04 DIAGNOSIS — I10 BENIGN ESSENTIAL HYPERTENSION: ICD-10-CM

## 2021-02-04 DIAGNOSIS — I48.91 ATRIAL FIBRILLATION, UNSPECIFIED TYPE (HCC): ICD-10-CM

## 2021-02-04 DIAGNOSIS — R26.9 GAIT ABNORMALITY: ICD-10-CM

## 2021-02-04 DIAGNOSIS — E78.2 HYPERLIPIDEMIA, MIXED: ICD-10-CM

## 2021-02-04 PROCEDURE — 99335 PR DOM/R-HOME E/M EST PT LW MOD SEVERITY 25 MINUTES: CPT | Performed by: FAMILY MEDICINE

## 2021-02-04 NOTE — PROGRESS NOTES
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  Pt  Is usunig a walker  And  Gets around and is not requesting anything for  Pain  I think she is  havoing more  Pain and have  Some  Concerns about  Her gait and use of her  Walker  Pt  Will need a PT/OT consult  for she has  Issues with her arthritis  And mobility issues  The following portions of the patient's history were reviewed and updated as appropriate:   Past Medical History:  She has no past medical history on file ,  _______________________________________________________________________  Medical Problems:  does not have any pertinent problems on file ,  _______________________________________________________________________  Past Surgical History:   has a past surgical history that includes Hiatal hernia repair; Appendectomy; Eye surgery; Renal mass excision (Right); and Tonsillectomy  ,  _______________________________________________________________________  Family History:  family history includes Dementia in her mother; Heart disease in her brother ,  _______________________________________________________________________  Social History:   reports that she has never smoked  She has never used smokeless tobacco  She reports that she does not drink alcohol or use drugs  ,  _______________________________________________________________________  Allergies:  has No Known Allergies     _______________________________________________________________________  Current Outpatient Medications   Medication Sig Dispense Refill    amLODIPine (NORVASC) 5 mg tablet TAKE 1 TABLET DAILY 90 tablet 3    apixaban (ELIQUIS) 5 mg Take 1 tablet (5 mg total) by mouth 2 (two) times a day 60 tablet 6    atorvastatin (LIPITOR) 20 mg tablet Take 1 tablet (20 mg total) by mouth daily 30 tablet 5    cyanocobalamin (VITAMIN B-12) 1,000 mcg tablet Take 1 tablet (1,000 mcg total) by mouth daily 30 tablet 5    donepezil (ARICEPT) 10 mg tablet Take 1 tablet (10 mg total) by mouth daily at bedtime 30 tablet 5    lisinopril-hydrochlorothiazide (PRINZIDE,ZESTORETIC) 20-12 5 MG per tablet Take 1 tablet by mouth 2 (two) times a day 60 tablet 5    metoprolol tartrate (LOPRESSOR) 25 mg tablet Take 1 tablet (25 mg total) by mouth 2 (two) times a day 60 tablet 5    potassium chloride (KLOR-CON M20) 20 mEq tablet Take 1 tablet (20 mEq total) by mouth daily for 180 days 30 tablet 5     No current facility-administered medications for this visit       _______________________________________________________________________  Review of Systems   Constitutional: Negative for activity change, appetite change, fatigue and fever  HENT: Negative for congestion, ear pain, postnasal drip, rhinorrhea, sinus pressure, sinus pain, sneezing and sore throat  Eyes: Negative for pain and redness  Respiratory: Negative for apnea, cough, chest tightness, shortness of breath and wheezing  Cardiovascular: Negative for chest pain, palpitations and leg swelling  Gastrointestinal: Negative for abdominal pain, constipation, diarrhea, nausea and vomiting  Endocrine: Negative for cold intolerance and heat intolerance  Genitourinary: Negative for difficulty urinating, dysuria, frequency, hematuria and urgency  Musculoskeletal: Positive for gait problem  Negative for arthralgias, back pain and myalgias  Skin: Negative for rash  Neurological: Negative for dizziness, speech difficulty, weakness, numbness and headaches  Hematological: Does not bruise/bleed easily  Psychiatric/Behavioral: Negative for agitation, confusion and hallucinations  Objective: There were no vitals filed for this visit  There is no height or weight on file to calculate BMI  Physical Exam  Vitals signs and nursing note reviewed  Constitutional:       Appearance: Normal appearance  She is well-developed  She is obese  HENT:      Head: Normocephalic and atraumatic  Nose: Nose normal    Eyes:      General: No scleral icterus  Conjunctiva/sclera: Conjunctivae normal       Pupils: Pupils are equal, round, and reactive to light  Neck:      Musculoskeletal: Normal range of motion and neck supple  Thyroid: No thyromegaly  Cardiovascular:      Rate and Rhythm: Normal rate and regular rhythm  Pulmonary:      Effort: Pulmonary effort is normal       Breath sounds: Normal breath sounds  No wheezing  Abdominal:      General: Bowel sounds are normal  There is no distension  Palpations: Abdomen is soft  Tenderness: There is no abdominal tenderness  There is no guarding or rebound  Musculoskeletal: Normal range of motion  General: No tenderness or deformity  Comments: djd b/l knees  Skin:     General: Skin is warm and dry  Findings: No erythema or rash  Neurological:      Mental Status: She is alert and oriented to person, place, and time  Sensory: No sensory deficit  Motor: Weakness present  Gait: Gait abnormal    Psychiatric:         Behavior: Behavior normal          Thought Content:  Thought content normal          Judgment: Judgment normal

## 2021-03-20 ENCOUNTER — APPOINTMENT (EMERGENCY)
Dept: CT IMAGING | Facility: HOSPITAL | Age: 86
End: 2021-03-20
Payer: MEDICARE

## 2021-03-20 ENCOUNTER — HOSPITAL ENCOUNTER (EMERGENCY)
Facility: HOSPITAL | Age: 86
Discharge: NURSING, REHAB OR LONG-TERM CARE | End: 2021-03-20
Attending: SURGERY
Payer: MEDICARE

## 2021-03-20 VITALS
SYSTOLIC BLOOD PRESSURE: 140 MMHG | OXYGEN SATURATION: 98 % | HEART RATE: 82 BPM | DIASTOLIC BLOOD PRESSURE: 72 MMHG | TEMPERATURE: 98.6 F | RESPIRATION RATE: 18 BRPM | WEIGHT: 205.69 LBS

## 2021-03-20 DIAGNOSIS — T14.8XXA HEMATOMA: Primary | ICD-10-CM

## 2021-03-20 LAB
ANION GAP SERPL CALCULATED.3IONS-SCNC: 9 MMOL/L (ref 4–13)
APTT PPP: 28 SECONDS (ref 23–37)
ATRIAL RATE: 127 BPM
BASOPHILS # BLD AUTO: 0.04 THOUSANDS/ΜL (ref 0–0.1)
BASOPHILS NFR BLD AUTO: 0 % (ref 0–1)
BUN SERPL-MCNC: 21 MG/DL (ref 5–25)
CALCIUM SERPL-MCNC: 10.9 MG/DL (ref 8.3–10.1)
CHLORIDE SERPL-SCNC: 105 MMOL/L (ref 100–108)
CO2 SERPL-SCNC: 25 MMOL/L (ref 21–32)
CREAT SERPL-MCNC: 1.05 MG/DL (ref 0.6–1.3)
EOSINOPHIL # BLD AUTO: 0.01 THOUSAND/ΜL (ref 0–0.61)
EOSINOPHIL NFR BLD AUTO: 0 % (ref 0–6)
ERYTHROCYTE [DISTWIDTH] IN BLOOD BY AUTOMATED COUNT: 15.3 % (ref 11.6–15.1)
GFR SERPL CREATININE-BSD FRML MDRD: 48 ML/MIN/1.73SQ M
GLUCOSE SERPL-MCNC: 176 MG/DL (ref 65–140)
HCT VFR BLD AUTO: 44.3 % (ref 34.8–46.1)
HGB BLD-MCNC: 14.5 G/DL (ref 11.5–15.4)
IMM GRANULOCYTES # BLD AUTO: 0.13 THOUSAND/UL (ref 0–0.2)
IMM GRANULOCYTES NFR BLD AUTO: 1 % (ref 0–2)
INR PPP: 1.18 (ref 0.84–1.19)
LYMPHOCYTES # BLD AUTO: 0.94 THOUSANDS/ΜL (ref 0.6–4.47)
LYMPHOCYTES NFR BLD AUTO: 8 % (ref 14–44)
MCH RBC QN AUTO: 30 PG (ref 26.8–34.3)
MCHC RBC AUTO-ENTMCNC: 32.7 G/DL (ref 31.4–37.4)
MCV RBC AUTO: 92 FL (ref 82–98)
MONOCYTES # BLD AUTO: 0.54 THOUSAND/ΜL (ref 0.17–1.22)
MONOCYTES NFR BLD AUTO: 5 % (ref 4–12)
NEUTROPHILS # BLD AUTO: 9.53 THOUSANDS/ΜL (ref 1.85–7.62)
NEUTS SEG NFR BLD AUTO: 86 % (ref 43–75)
NRBC BLD AUTO-RTO: 0 /100 WBCS
PLATELET # BLD AUTO: 209 THOUSANDS/UL (ref 149–390)
PMV BLD AUTO: 11.3 FL (ref 8.9–12.7)
POTASSIUM SERPL-SCNC: 4.2 MMOL/L (ref 3.5–5.3)
PROTHROMBIN TIME: 15.1 SECONDS (ref 11.6–14.5)
QRS AXIS: 44 DEGREES
QRSD INTERVAL: 90 MS
QT INTERVAL: 330 MS
QTC INTERVAL: 438 MS
RBC # BLD AUTO: 4.84 MILLION/UL (ref 3.81–5.12)
SODIUM SERPL-SCNC: 139 MMOL/L (ref 136–145)
T WAVE AXIS: -72 DEGREES
VENTRICULAR RATE: 106 BPM
WBC # BLD AUTO: 11.19 THOUSAND/UL (ref 4.31–10.16)

## 2021-03-20 PROCEDURE — 85730 THROMBOPLASTIN TIME PARTIAL: CPT | Performed by: SURGERY

## 2021-03-20 PROCEDURE — 99282 EMERGENCY DEPT VISIT SF MDM: CPT | Performed by: SURGERY

## 2021-03-20 PROCEDURE — 70450 CT HEAD/BRAIN W/O DYE: CPT

## 2021-03-20 PROCEDURE — 76705 ECHO EXAM OF ABDOMEN: CPT | Performed by: SURGERY

## 2021-03-20 PROCEDURE — 85025 COMPLETE CBC W/AUTO DIFF WBC: CPT | Performed by: SURGERY

## 2021-03-20 PROCEDURE — 72125 CT NECK SPINE W/O DYE: CPT

## 2021-03-20 PROCEDURE — 80048 BASIC METABOLIC PNL TOTAL CA: CPT | Performed by: SURGERY

## 2021-03-20 PROCEDURE — 85610 PROTHROMBIN TIME: CPT | Performed by: SURGERY

## 2021-03-20 PROCEDURE — 36415 COLL VENOUS BLD VENIPUNCTURE: CPT | Performed by: SURGERY

## 2021-03-20 PROCEDURE — 99285 EMERGENCY DEPT VISIT HI MDM: CPT

## 2021-03-20 PROCEDURE — 93005 ELECTROCARDIOGRAM TRACING: CPT

## 2021-03-20 PROCEDURE — NC001 PR NO CHARGE: Performed by: EMERGENCY MEDICINE

## 2021-03-20 PROCEDURE — 93010 ELECTROCARDIOGRAM REPORT: CPT | Performed by: INTERNAL MEDICINE

## 2021-03-20 PROCEDURE — 93308 TTE F-UP OR LMTD: CPT | Performed by: SURGERY

## 2021-03-20 RX ORDER — ACETAMINOPHEN 325 MG/1
650 TABLET ORAL EVERY 6 HOURS PRN
Qty: 30 TABLET | Refills: 0 | Status: SHIPPED | OUTPATIENT
Start: 2021-03-20 | End: 2022-02-06 | Stop reason: HOSPADM

## 2021-03-20 NOTE — H&P
H&P Exam - Trauma   Shellie Campbell 80 y o  female MRN: [id number]  Unit/Bed#: ED 17 Encounter: [id number]    Assessment/Plan   Trauma Alert: Level B  Model of Arrival: Ambulance  Trauma Team: Attending To and JACQUELINE Ortega  Consultants: None    Trauma Active Problems: S/P Fall  Left forehead hematoma    Trauma Plan: Discharge back to facility      Chief Complaint: none    History of Present Illness   HPI:  Shellie Campbell is a 80 y o  female who presents after a fall at her facility  She is on Eliqous and has a hematoma over left orbit on her forehead  No other injuries visible, no complaints, moving all four [id number]  No complaint of chest pain, no shortness of breath at this time  Meds reviewed, labs drawn  No complaint of back pain  Mechanism:Fall    Review of Systems   Constitutional: Negative  HENT: Negative  Eyes: Negative  Respiratory: Negative  Cardiovascular: Negative  Gastrointestinal: Negative  Endocrine: Negative  Genitourinary: Negative  Musculoskeletal: Negative  Skin:        heamtoma on left forehead above orbit   Allergic/Immunologic: Negative  Neurological: Negative  Hematological: Negative  Psychiatric/Behavioral: Negative  12-point, complete review of systems was reviewed and negative except as stated above  Historical Information   History is obtainable from the patient     Efforts to obtain history included the following sources:     No past medical history on file  No past surgical history on file  Social History   Social History     Substance and Sexual Activity   Alcohol Use Not on file     Social History     Substance and Sexual Activity   Drug Use Not on file     Social History     Tobacco Use   Smoking Status Not on file     No existing history information found  No existing history information found  There is no immunization history on file for this patient    Last Tetanus: unknown  Family History: Non-contributory        Meds/Allergies   No list sent from facility  Definitely takes Eliqous for A-fib    Not on File      PHYSICAL EXAM        Objective   Vitals:   First set: Temperature: (!) 94 8 °F (34 9 °C) (03/20/21 0814)  Pulse: 68 (03/20/21 0815)  Respirations: 16 (03/20/21 0816)  Blood Pressure: 140/85 (03/20/21 0816)    Primary Survey:   (A) Airway: patent  (B) Breathing: non-labored  (C) Circulation: Pulses:   normal  (D) Disabliity:  GCS Total:  15, Eye Opening:   Spontaneous = 4, Motor Response: Obeys commands = 6 and Verbal Response:  Oriented = 5  (E) Expose:  Completed    Secondary Survey: (Click on Physical Exam tab above)  Physical Exam  Vitals signs reviewed  Constitutional:       Appearance: Normal appearance  HENT:      Head: Normocephalic  Comments: Hematoma left forehead     Right Ear: Tympanic membrane normal       Left Ear: Tympanic membrane normal       Nose: Nose normal       Mouth/Throat:      Pharynx: Oropharynx is clear  Eyes:      Extraocular Movements: Extraocular movements intact  Conjunctiva/sclera: Conjunctivae normal       Pupils: Pupils are equal, round, and reactive to light  Neck:      Musculoskeletal: Normal range of motion and neck supple  No neck rigidity or muscular tenderness  Vascular: No carotid bruit  Cardiovascular:      Rate and Rhythm: Normal rate and regular rhythm  Pulses: Normal pulses  Heart sounds: Normal heart sounds  Comments: On 12 lead ekg Atrial fibrillation with RVR  Pulmonary:      Effort: Pulmonary effort is normal  No respiratory distress  Breath sounds: Normal breath sounds  No stridor  No wheezing, rhonchi or rales  Chest:      Chest wall: No tenderness  Abdominal:      General: Bowel sounds are normal  There is no distension  Palpations: Abdomen is soft  There is no mass  Tenderness: There is no abdominal tenderness   There is no right CVA tenderness, left CVA tenderness, guarding or rebound  Hernia: No hernia is present  Genitourinary:     Comments: pernieum negative  Musculoskeletal: Normal range of motion  General: No tenderness  Lymphadenopathy:      Cervical: No cervical adenopathy  Skin:     General: Skin is warm and dry  Capillary Refill: Capillary refill takes less than 2 seconds  Neurological:      General: No focal deficit present  Mental Status: She is alert and oriented to person, place, and time  Cranial Nerves: No cranial nerve deficit  Sensory: No sensory deficit  Motor: No weakness  Coordination: Coordination normal       Gait: Gait normal       Deep Tendon Reflexes: Reflexes normal    Psychiatric:         Mood and Affect: Mood normal          Behavior: Behavior normal          Invasive Devices     Peripheral Intravenous Line            Peripheral IV Left Antecubital -- days                Lab Results: Results for Celia Jaime (MRN [id number]) as of 3/20/2021 09:50   Ref   Range 3/20/2021 08:23   Sodium Latest Ref Range: 136 - 145 mmol/L 139   Potassium Latest Ref Range: 3 5 - 5 3 mmol/L 4 2   Chloride Latest Ref Range: 100 - 108 mmol/L 105   CO2 Latest Ref Range: 21 - 32 mmol/L 25   Anion Gap Latest Ref Range: 4 - 13 mmol/L 9   BUN Latest Ref Range: 5 - 25 mg/dL 21   Creatinine Latest Ref Range: 0 60 - 1 30 mg/dL 1 05   Glucose, Random Latest Ref Range: 65 - 140 mg/dL 176 (H)   Calcium Latest Ref Range: 8 3 - 10 1 mg/dL 10 9 (H)   eGFR Latest Units: ml/min/1 73sq m 48   WBC Latest Ref Range: 4 31 - 10 16 Thousand/uL 11 19 (H)   Red Blood Cell Count Latest Ref Range: 3 81 - 5 12 Million/uL 4 84   Hemoglobin Latest Ref Range: 11 5 - 15 4 g/dL 14 5   HCT Latest Ref Range: 34 8 - 46 1 % 44 3   MCV Latest Ref Range: 82 - 98 fL 92   MCH Latest Ref Range: 26 8 - 34 3 pg 30 0   MCHC Latest Ref Range: 31 4 - 37 4 g/dL 32 7   RDW Latest Ref Range: 11 6 - 15 1 % 15 3 (H)   Platelet Count Latest Ref Range: 149 - 390 Thousands/uL 209   MPV Latest Ref Range: 8 9 - 12 7 fL 11 3   nRBC Latest Units: /100 WBCs 0   Neutrophils % Latest Ref Range: 43 - 75 % 86 (H)   Immat GRANS % Latest Ref Range: 0 - 2 % 1   Lymphocytes Relative Latest Ref Range: 14 - 44 % 8 (L)   Monocytes Relative Latest Ref Range: 4 - 12 % 5   Eosinophils Latest Ref Range: 0 - 6 % 0   Basophils Relative Latest Ref Range: 0 - 1 % 0   Immature Grans Absolute Latest Ref Range: 0 00 - 0 20 Thousand/uL 0 13   Absolute Neutrophils Latest Ref Range: 1 85 - 7 62 Thousands/µL 9 53 (H)   Lymphocytes Absolute Latest Ref Range: 0 60 - 4 47 Thousands/µL 0 94   Absolute Monocytes Latest Ref Range: 0 17 - 1 22 Thousand/µL 0 54   Absolute Eosinophils Latest Ref Range: 0 00 - 0 61 Thousand/µL 0 01   Basophils Absolute Latest Ref Range: 0 00 - 0 10 Thousands/µL 0 04   Protime Latest Ref Range: 11 6 - 14 5 seconds 15 1 (H)   INR Latest Ref Range: 0 84 - 1 19  1 18   PTT Latest Ref Range: 23 - 37 seconds 28     Imaging/EKG Studies: CXR - neg  Other Studies: HCT - neg  CT C-spine - neg, non-tender, FROM, cleared clinically and collar removed    Code Status: No Order  Advance Directive and Living Will:      Power of :    POLST:

## 2021-03-20 NOTE — PROCEDURES
POC FAST US    Date/Time: 3/20/2021 10:31 AM  Performed by: FARHAT Walsh  Authorized by:  FARHAT Walsh     Patient location:  Trauma  Procedure details:     Exam Type:  Diagnostic    Technique: FAST      Views obtained:  Heart - Pericardial sac, Suprapubic - Pouch of Homar, RUQ - Man's Pouch and LUQ - Splenorenal space    Image availability:  Images available in PACS  FAST Findings:     RUQ (Hepatorenal) free fluid: absent      LUQ (Splenorenal) free fluid: absent      Cardiac wall motion: identified      Pericardial effusion: absent    Interpretation:     Impressions: negative

## 2021-03-20 NOTE — ED PROVIDER NOTES
Emergency Department Airway Evaluation and Management Form    History  Obtained from: EMS  Patient has no allergy information on record  Chief Complaint   Patient presents with    Fall     HPI     Patient with unwitnessed fall at 4:00 a m  Vickie Farris She is on Eliquis  He is from a local nursing home  She has a history of dementia  No past medical history on file  No past surgical history on file  No family history on file  Social History     Tobacco Use    Smoking status: Not on file   Substance Use Topics    Alcohol use: Not on file    Drug use: Not on file     I have reviewed and agree with the history as documented  Review of Systems   Per Trauma    Physical Exam  /85   Pulse 68   Temp (!) 94 8 °F (34 9 °C)   Resp 16   SpO2 96%     Physical Exam  Per trauma    ED Medications  Medications - No data to display    Intubation  Procedures    Notes  Airway intact  Additional physical exam, diagnostic labs and imaging per trauma team who was at bedside and took over patient's care      Final Diagnosis  Final diagnoses:   None       ED Provider  Electronically Signed by     Genia Hopkins MD  03/20/21 2679

## 2021-03-20 NOTE — ED NOTES
[facility] will be here at 1030 for transportation back to [address] [place] [address]  03/20/21 9887

## 2021-03-26 ENCOUNTER — TELEPHONE (OUTPATIENT)
Dept: FAMILY MEDICINE CLINIC | Facility: CLINIC | Age: 86
End: 2021-03-26

## 2021-03-30 NOTE — TELEPHONE ENCOUNTER
03/29/21 11:59 PM     Thank you for your request  Your request has been received, reviewed, and the patient chart updated  The PCP has successfully been removed with a patient attribution note  This message will now be completed      Thank you  Chano Narayanan

## 2021-03-30 NOTE — TELEPHONE ENCOUNTER
03/29/21 11:59 PM     Thank you for your request  Your request has been received, reviewed, and the patient chart updated  The PCP has successfully been removed with a patient attribution note  This message will now be completed      Thank you  Milka Lucas

## 2021-04-08 ENCOUNTER — NURSING HOME VISIT (OUTPATIENT)
Dept: FAMILY MEDICINE CLINIC | Facility: CLINIC | Age: 86
End: 2021-04-08
Payer: MEDICARE

## 2021-04-08 DIAGNOSIS — E78.2 MIXED HYPERLIPIDEMIA: ICD-10-CM

## 2021-04-08 DIAGNOSIS — G30.1 LATE ONSET ALZHEIMER'S DISEASE WITHOUT BEHAVIORAL DISTURBANCE (HCC): ICD-10-CM

## 2021-04-08 DIAGNOSIS — F02.80 LATE ONSET ALZHEIMER'S DISEASE WITHOUT BEHAVIORAL DISTURBANCE (HCC): ICD-10-CM

## 2021-04-08 DIAGNOSIS — I48.19 PERSISTENT ATRIAL FIBRILLATION (HCC): ICD-10-CM

## 2021-04-08 DIAGNOSIS — Z00.00 WELL ADULT EXAM: ICD-10-CM

## 2021-04-08 DIAGNOSIS — I10 ESSENTIAL HYPERTENSION: Primary | ICD-10-CM

## 2021-04-08 PROCEDURE — 99337 PR DOM/R-HOME E/M EST PT SIGNIF NEW PROB 60 MINUTES: CPT | Performed by: FAMILY MEDICINE

## 2021-04-08 PROCEDURE — G0438 PPPS, INITIAL VISIT: HCPCS | Performed by: FAMILY MEDICINE

## 2021-04-11 PROBLEM — F02.80 LATE ONSET ALZHEIMER'S DISEASE WITHOUT BEHAVIORAL DISTURBANCE (HCC): Status: ACTIVE | Noted: 2021-04-11

## 2021-04-11 PROBLEM — G30.1 LATE ONSET ALZHEIMER'S DISEASE WITHOUT BEHAVIORAL DISTURBANCE (HCC): Status: ACTIVE | Noted: 2021-04-11

## 2021-04-11 PROBLEM — I48.19 PERSISTENT ATRIAL FIBRILLATION (HCC): Status: ACTIVE | Noted: 2021-04-11

## 2021-04-11 PROBLEM — E78.2 MIXED HYPERLIPIDEMIA: Status: ACTIVE | Noted: 2021-04-11

## 2021-04-11 PROBLEM — I10 ESSENTIAL HYPERTENSION: Status: ACTIVE | Noted: 2021-04-11

## 2021-04-11 NOTE — PROGRESS NOTES
Assessment and Plan:     Problem List Items Addressed This Visit        Cardiovascular and Mediastinum    Essential hypertension - Primary     Patient is stable  and will continue present plan of care and reassess at next routine visit         Persistent atrial fibrillation [facility])     Patient is stable  and will continue present plan of care and reassess at next routine visit            Nervous and Auditory    Late onset Alzheimer's disease without behavioral disturbance ([facility] [address] )     Patient is stable  and will continue present plan of care and reassess at next routine visit            Other    Mixed hyperlipidemia     Patient is stable  and will continue present plan of care and reassess at next routine visit                Preventive health issues were discussed with patient, and age appropriate screening tests were ordered as noted in patient's After Visit Summary  Personalized health advice and appropriate referrals for health education or preventive services given if needed, as noted in patient's After Visit Summary  History of Present Illness:     Patient presents for Medicare Annual Wellness visit    Patient Care Team:  Junito Camarena MD as PCP - General (Internal Medicine)     Problem List:     Patient Active Problem List   Diagnosis    Hematoma    Essential hypertension    Persistent atrial fibrillation ([facility] [address] )    Mixed hyperlipidemia    Late onset Alzheimer's disease without behavioral disturbance ([facility] )      Past Medical and Surgical History:     No past medical history on file  No past surgical history on file  Family History:     No family history on file  Social History:     No existing history information found  No existing history information found  Social History     Socioeconomic History    Marital status:       Spouse name: Not on file    Number of children: Not on file    Years of education: Not on file    Highest education level: Not on file   Occupational History    Not on file   Social Needs    Financial resource strain: Not on file    Food insecurity     Worry: Not on file     Inability: Not on file    Transportation needs     Medical: Not on file     Non-medical: Not on file   Tobacco Use    Smoking status: Not on file   Substance and Sexual Activity    Alcohol use: Not on file    Drug use: Not on file    Sexual activity: Not on file   Lifestyle    Physical activity     Days per week: Not on file     Minutes per session: Not on file    Stress: Not on file   Relationships    Social connections     Talks on phone: Not on file     Gets together: Not on file     Attends Zoroastrianism service: Not on file     Active member of club or organization: Not on file     Attends meetings of clubs or organizations: Not on file     Relationship status: Not on file    Intimate partner violence     Fear of current or ex partner: Not on file     Emotionally abused: Not on file     Physically abused: Not on file     Forced sexual activity: Not on file   Other Topics Concern    Not on file   Social History Narrative    Not on file      Medications and Allergies:     Current Outpatient Medications   Medication Sig Dispense Refill    acetaminophen (TYLENOL) 325 mg tablet Take 2 tablets (650 mg total) by mouth every 6 (six) hours as needed for mild pain 30 tablet 0     No current facility-administered medications for this visit  Not on File   Immunizations: There is no immunization history on file for this patient  Health Maintenance: There are no preventive care reminders to display for this patient  Topic Date Due    COVID-19 Vaccine (1) Never done    DTaP,Tdap,and Td Vaccines (1 - Tdap) Never done    Pneumococcal Vaccine: 65+ Years (1 of 1 - PPSV23) Never done    Influenza Vaccine (1) Never done      Medicare Health Risk Assessment:     There were no vitals taken for this visit  Devon Rickie is here for her Subsequent Wellness visit       Health Risk Assessment: Patient rates overall health as good  Patient feels that their physical health rating is same  Patient is satisfied with their life  Eyesight was rated as same  Hearing was rated as same  Patient feels that their emotional and mental health rating is same  Patients states they are never, rarely angry  Patient states they are sometimes unusually tired/fatigued  Pain experienced in the last 7 days has been none  Patient states that she has experienced no weight loss or gain in last 6 months  Depression Screening:   PHQ-2 Score: 0      Fall Risk Screening: In the past year, patient has experienced: no history of falling in past year      Urinary Incontinence Screening:   Patient has leaked urine accidently in the last six months  Home Safety:  Patient has trouble with stairs inside or outside of their home  Home safety hazards include: none  Nutrition:   Current diet is Low Cholesterol, Low Carb, No Added Salt and Low Saturated Fat  Medications:   Patient is not currently taking any over-the-counter supplements  Patient is not able to manage medications  Activities of Daily Living (ADLs)/Instrumental Activities of Daily Living (IADLs):   Walk and transfer into and out of bed and chair?: Yes  Dress and groom yourself?: Yes    Bathe or shower yourself?: Yes    Feed yourself? No  Do your laundry/housekeeping?: No  Manage your money, pay your bills and track your expenses?: No  Make your own meals?: No    Do your own shopping?: No    Advance Care Planning:   Living will: Yes    Durable POA for healthcare:  Yes    Advanced directive: Yes      PREVENTIVE SCREENINGS      Cardiovascular Screening:    General: Screening Not Indicated and History Lipid Disorder      Diabetes Screening:     General: Screening Current      Colorectal Cancer Screening:     General: Screening Not Indicated      Cervical Cancer Screening:    General: Screening Not Indicated      Lung Cancer Screening:     General: Screening Not Indicated    Screening, Brief Intervention, and Referral to Treatment (SBIRT)    Screening  Typical number of drinks in a day: 0  Typical number of drinks in a week: 0  Interpretation: Low risk drinking behavior      Single Item Drug Screening:  How often have you used an illegal drug (including marijuana) or a prescription medication for non-medical reasons in the past year? never    Single Item Drug Screen Score: 0  Interpretation: Negative screen for possible drug use disorder      Julee Hartmann MD

## 2021-04-11 NOTE — PROGRESS NOTES
BMI Counseling: BMI was not able to be calculated due to patient refusing height and/or weight  Assessment/Plan:         Problem List Items Addressed This Visit        Cardiovascular and Mediastinum    Essential hypertension - Primary     Patient is stable  and will continue present plan of care and reassess at next routine visit         Persistent atrial fibrillation [facility])     Patient is stable  and will continue present plan of care and reassess at next routine visit            Nervous and Auditory    Late onset Alzheimer's disease without behavioral disturbance ([facility] [address] )     Patient is stable  and will continue present plan of care and reassess at next routine visit            Other    Mixed hyperlipidemia     Patient is stable  and will continue present plan of care and reassess at next routine visit           Other Visit Diagnoses     Well adult exam                Subjective:      Patient ID: Rhea Hernandez is a 80 y o  female  This is an 80-year-old white female for her Medicare wellness her actual yearly physical exam for assisted living facility  She had her [address] filled out today as well as perform her Medicare wellness  Patient has history of hypertension dementia increased lipids as well as atrial fibrillation  Patient is releasing the facility does quite well and has no problem with her medications      The following portions of the patient's history were reviewed and updated as appropriate:   Past Medical History:  She has a past medical history of Essential hypertension (4/11/2021), Late onset Alzheimer's disease without behavioral disturbance ([facility] [address] ) (4/11/2021), Mixed hyperlipidemia (4/11/2021), and [facility] (4/11/2021)  ,  _______________________________________________________________________  Medical Problems:  does not have any pertinent problems on file ,  _______________________________________________________________________  Past Surgical History:   has no past surgical history on file ,  _______________________________________________________________________  Family History:  family history is not on file ,  _______________________________________________________________________  Social History:   reports that she has never smoked  She has never used smokeless tobacco  She reports previous alcohol use  No history on file for drug ,  _______________________________________________________________________  Allergies:  has no allergies on file     _______________________________________________________________________  Current Outpatient Medications   Medication Sig Dispense Refill    acetaminophen (TYLENOL) 325 mg tablet Take 2 tablets (650 mg total) by mouth every 6 (six) hours as needed for mild pain 30 tablet 0     No current facility-administered medications for this visit       _______________________________________________________________________  Review of Systems   Constitutional: Negative for activity change, appetite change, fatigue and fever  HENT: Negative for congestion, ear pain, postnasal drip, rhinorrhea, sinus pressure, sinus pain, sneezing and sore throat  Eyes: Negative for pain and redness  Respiratory: Negative for apnea, cough, chest tightness, shortness of breath and wheezing  Cardiovascular: Negative for chest pain, palpitations and leg swelling  Gastrointestinal: Negative for abdominal pain, constipation, diarrhea, nausea and vomiting  Endocrine: Negative for cold intolerance and heat intolerance  Genitourinary: Negative for difficulty urinating, dysuria, frequency, hematuria and urgency  Musculoskeletal: Positive for gait problem  Negative for arthralgias, back pain and myalgias  Skin: Negative for rash  Neurological: Negative for dizziness, speech difficulty, weakness, numbness and headaches  Hematological: Does not bruise/bleed easily  Psychiatric/Behavioral: Positive for confusion   Negative for agitation, dysphoric mood, hallucinations and sleep disturbance  The patient is not nervous/anxious  Objective: There were no vitals filed for this visit  There is no height or weight on file to calculate BMI  Physical Exam  Vitals signs and nursing note reviewed  Constitutional:       General: She is not in acute distress  Appearance: She is well-developed  She is obese  She is not ill-appearing or toxic-appearing  HENT:      Head: Normocephalic and atraumatic  Right Ear: Tympanic membrane, ear canal and external ear normal       Left Ear: Tympanic membrane, ear canal and external ear normal       Nose: Nose normal       Mouth/Throat:      Mouth: Mucous membranes are moist    Eyes:      General: No scleral icterus  Conjunctiva/sclera: Conjunctivae normal       Pupils: Pupils are equal, round, and reactive to light  Neck:      Musculoskeletal: Normal range of motion and neck supple  Thyroid: No thyromegaly  Cardiovascular:      Rate and Rhythm: Normal rate and regular rhythm  Pulmonary:      Effort: Pulmonary effort is normal  No respiratory distress  Breath sounds: Normal breath sounds  No stridor  No wheezing, rhonchi or rales  Abdominal:      General: Bowel sounds are normal  There is no distension  Palpations: Abdomen is soft  Tenderness: There is no abdominal tenderness  There is no guarding or rebound  Musculoskeletal: Normal range of motion  General: No tenderness or deformity  Skin:     General: Skin is warm and dry  Findings: No erythema or rash  Neurological:      Mental Status: She is alert  Mental status is at baseline  She is disoriented  Sensory: No sensory deficit  Motor: Weakness present  Gait: Gait abnormal    Psychiatric:         Mood and Affect: Mood normal          Behavior: Behavior normal          Thought Content:  Thought content normal          Judgment: Judgment normal

## 2021-04-11 NOTE — PATIENT INSTRUCTIONS
Medicare Preventive Visit Patient Instructions  Thank you for completing your Welcome to Medicare Visit or Medicare Annual Wellness Visit today  Your next wellness visit will be due in one year (4/12/2022)  The screening/preventive services that you may require over the next 5-10 years are detailed below  Some tests may not apply to you based off risk factors and/or age  Screening tests ordered at today's visit but not completed yet may show as past due  Also, please note that scanned in results may not display below  Preventive Screenings:  Service Recommendations Previous Testing/Comments   Colorectal Cancer Screening  * Colonoscopy    * Fecal Occult Blood Test (FOBT)/Fecal Immunochemical Test (FIT)  * Fecal DNA/Cologuard Test  * Flexible Sigmoidoscopy Age: 54-65 years old   Colonoscopy: every 10 years (may be performed more frequently if at higher risk)  OR  FOBT/FIT: every 1 year  OR  Cologuard: every 3 years  OR  Sigmoidoscopy: every 5 years  Screening may be recommended earlier than age 48 if at higher risk for colorectal cancer  Also, an individualized decision between you and your healthcare provider will decide whether screening between the ages of 74-80 would be appropriate  Colonoscopy: Not on file  FOBT/FIT: Not on file  Cologuard: Not on file  Sigmoidoscopy: Not on file    Screening Not Indicated     Breast Cancer Screening Age: 36 years old  Frequency: every 1-2 years  Not required if history of left and right mastectomy Mammogram: Not on file        Cervical Cancer Screening Between the ages of 21-29, pap smear recommended once every 3 years  Between the ages of 33-67, can perform pap smear with HPV co-testing every 5 years     Recommendations may differ for women with a history of total hysterectomy, cervical cancer, or abnormal pap smears in past  Pap Smear: Not on file    Screening Not Indicated   Hepatitis C Screening Once for adults born between 1945 and 1965  More frequently in patients at high risk for Hepatitis C Hep C Antibody: Not on file        Diabetes Screening 1-2 times per year if you're at risk for diabetes or have pre-diabetes Fasting glucose: No results in last 5 years   A1C: No results in last 5 years    Screening Current   Cholesterol Screening Once every 5 years if you don't have a lipid disorder  May order more often based on risk factors  Lipid panel: Not on file    Screening Not Indicated  History Lipid Disorder     Other Preventive Screenings Covered by Medicare:  1  Abdominal Aortic Aneurysm (AAA) Screening: covered once if your at risk  You're considered to be at risk if you have a family history of AAA  2  Lung Cancer Screening: covers low dose CT scan once per year if you meet all of the following conditions: (1) Age 50-69; (2) No signs or symptoms of lung cancer; (3) Current smoker or have quit smoking within the last 15 years; (4) You have a tobacco smoking history of at least 30 pack years (packs per day multiplied by number of years you smoked); (5) You get a written order from a healthcare provider  3  Glaucoma Screening: covered annually if you're considered high risk: (1) You have diabetes OR (2) Family history of glaucoma OR (3)  aged 48 and older OR (3)  American aged 72 and older  3  Osteoporosis Screening: covered every 2 years if you meet one of the following conditions: (1) You're estrogen deficient and at risk for osteoporosis based off medical history and other findings; (2) Have a vertebral abnormality; (3) On glucocorticoid therapy for more than 3 months; (4) Have primary hyperparathyroidism; (5) On osteoporosis medications and need to assess response to drug therapy  · Last bone density test (DXA Scan): Not on file  5  HIV Screening: covered annually if you're between the age of 12-76  Also covered annually if you are younger than 13 and older than 72 with risk factors for HIV infection   For pregnant patients, it is covered up to 3 times per pregnancy  Immunizations:  Immunization Recommendations   Influenza Vaccine Annual influenza vaccination during flu season is recommended for all persons aged >= 6 months who do not have contraindications   Pneumococcal Vaccine (Prevnar and Pneumovax)  * Prevnar = PCV13  * Pneumovax = PPSV23   Adults 25-60 years old: 1-3 doses may be recommended based on certain risk factors  Adults 72 years old: Prevnar (PCV13) vaccine recommended followed by Pneumovax (PPSV23) vaccine  If already received PPSV23 since turning 65, then PCV13 recommended at least one year after PPSV23 dose  Hepatitis B Vaccine 3 dose series if at intermediate or high risk (ex: diabetes, end stage renal disease, liver disease)   Tetanus (Td) Vaccine - COST NOT COVERED BY MEDICARE PART B Following completion of primary series, a booster dose should be given every 10 years to maintain immunity against tetanus  Td may also be given as tetanus wound prophylaxis  Tdap Vaccine - COST NOT COVERED BY MEDICARE PART B Recommended at least once for all adults  For pregnant patients, recommended with each pregnancy  Shingles Vaccine (Shingrix) - COST NOT COVERED BY MEDICARE PART B  2 shot series recommended in those aged 48 and above     Health Maintenance Due:  There are no preventive care reminders to display for this patient  Immunizations Due:      Topic Date Due    COVID-19 Vaccine (1) Never done    DTaP,Tdap,and Td Vaccines (1 - Tdap) Never done    Pneumococcal Vaccine: 65+ Years (1 of 1 - PPSV23) Never done    Influenza Vaccine (1) Never done     Advance Directives   What are advance directives? Advance directives are legal documents that state your wishes and plans for medical care  These plans are made ahead of time in case you lose your ability to make decisions for yourself  Advance directives can apply to any medical decision, such as the treatments you want, and if you want to donate organs     What are the types of advance directives? There are many types of advance directives, and each state has rules about how to use them  You may choose a combination of any of the following:  · Living will: This is a written record of the treatment you want  You can also choose which treatments you do not want, which to limit, and which to stop at a certain time  This includes surgery, medicine, IV fluid, and tube feedings  · Durable power of  for healthcare [facility]): This is a written record that states who you want to make healthcare choices for you when you are unable to make them for yourself  This person, called a proxy, is usually a family member or a friend  You may choose more than 1 proxy  · Do not resuscitate (DNR) order:  A DNR order is used in case your heart stops beating or you stop breathing  It is a request not to have certain forms of treatment, such as CPR  A DNR order may be included in other types of advance directives  · Medical directive: This covers the care that you want if you are in a coma, near death, or unable to make decisions for yourself  You can list the treatments you want for each condition  Treatment may include pain medicine, surgery, blood transfusions, dialysis, IV or tube feedings, and a ventilator (breathing machine)  · Values history: This document has questions about your views, beliefs, and how you feel and think about life  This information can help others choose the care that you would choose  Why are advance directives important? An advance directive helps you control your care  Although spoken wishes may be used, it is better to have your wishes written down  Spoken wishes can be misunderstood, or not followed  Treatments may be given even if you do not want them  An advance directive may make it easier for your family to make difficult choices about your care  Urinary Incontinence   Urinary incontinence (UI)  is when you lose control of your bladder   UI develops because your bladder cannot store or empty urine properly  The 3 most common types of UI are stress incontinence, urge incontinence, or both  Medicines:   · May be given to help strengthen your bladder control  Report any side effects of medication to your healthcare provider  Do pelvic muscle exercises often:  Your pelvic muscles help you stop urinating  Squeeze these muscles tight for 5 seconds, then relax for 5 seconds  Gradually work up to squeezing for 10 seconds  Do 3 sets of 15 repetitions a day, or as directed  This will help strengthen your pelvic muscles and improve bladder control  Train your bladder:  Go to the bathroom at set times, such as every 2 hours, even if you do not feel the urge to go  You can also try to hold your urine when you feel the urge to go  For example, hold your urine for 5 minutes when you feel the urge to go  As that becomes easier, hold your urine for 10 minutes  Self-care:   · Keep a UI record  Write down how often you leak urine and how much you leak  Make a note of what you were doing when you leaked urine  · Drink liquids as directed  You may need to limit the amount of liquid you drink to help control your urine leakage  Do not drink any liquid right before you go to bed  Limit or do not have drinks that contain caffeine or alcohol  · Prevent constipation  Eat a variety of high-fiber foods  Good examples are high-fiber cereals, beans, vegetables, and whole-grain breads  Walking is the best way to trigger your intestines to have a bowel movement  · Exercise regularly and maintain a healthy weight  Weight loss and exercise will decrease pressure on your bladder and help you control your leakage  · Use a catheter as directed  to help empty your bladder  A catheter is a tiny, plastic tube that is put into your bladder to drain your urine  · Go to behavior therapy as directed  Behavior therapy may be used to help you learn to control your urge to urinate         © Copyright [employer] [facility] 2018 Information is for [employer] use only and may not be sold, redistributed or otherwise used for commercial purposes   All illustrations and images included in CareNotes® are the copyrighted property of A D A M , Inc  or [address] [place], [place] [address]maryanne gino

## 2021-04-29 ENCOUNTER — NURSING HOME VISIT (OUTPATIENT)
Dept: FAMILY MEDICINE CLINIC | Facility: CLINIC | Age: 86
End: 2021-04-29
Payer: MEDICARE

## 2021-04-29 DIAGNOSIS — R73.9 HYPERGLYCEMIA: Primary | ICD-10-CM

## 2021-04-29 DIAGNOSIS — F02.80 LATE ONSET ALZHEIMER'S DISEASE WITHOUT BEHAVIORAL DISTURBANCE (HCC): ICD-10-CM

## 2021-04-29 DIAGNOSIS — G30.1 LATE ONSET ALZHEIMER'S DISEASE WITHOUT BEHAVIORAL DISTURBANCE (HCC): ICD-10-CM

## 2021-04-29 PROCEDURE — 99335 PR DOM/R-HOME E/M EST PT LW MOD SEVERITY 25 MINUTES: CPT | Performed by: FAMILY MEDICINE

## 2021-04-30 LAB — HBA1C MFR BLD HPLC: 5.7 %

## 2021-05-01 PROBLEM — R73.9 HYPERGLYCEMIA: Status: ACTIVE | Noted: 2021-05-01

## 2021-07-11 ENCOUNTER — HOSPITAL ENCOUNTER (EMERGENCY)
Facility: HOSPITAL | Age: 86
Discharge: NURSING, REHAB OR LONG-TERM CARE | End: 2021-07-11
Attending: EMERGENCY MEDICINE
Payer: MEDICARE

## 2021-07-11 VITALS
SYSTOLIC BLOOD PRESSURE: 142 MMHG | DIASTOLIC BLOOD PRESSURE: 70 MMHG | HEART RATE: 96 BPM | TEMPERATURE: 97.6 F | OXYGEN SATURATION: 97 % | BODY MASS INDEX: 34.65 KG/M2 | RESPIRATION RATE: 22 BRPM | WEIGHT: 205.03 LBS

## 2021-07-11 DIAGNOSIS — R31.9 HEMATURIA: Primary | ICD-10-CM

## 2021-07-11 LAB
ALBUMIN SERPL BCP-MCNC: 3.3 G/DL (ref 3.5–5)
ALP SERPL-CCNC: 85 U/L (ref 46–116)
ALT SERPL W P-5'-P-CCNC: 25 U/L (ref 12–78)
ANION GAP SERPL CALCULATED.3IONS-SCNC: 5 MMOL/L (ref 4–13)
AST SERPL W P-5'-P-CCNC: 14 U/L (ref 5–45)
BASOPHILS # BLD AUTO: 0.06 THOUSANDS/ΜL (ref 0–0.1)
BASOPHILS NFR BLD AUTO: 1 % (ref 0–1)
BILIRUB DIRECT SERPL-MCNC: 0.14 MG/DL (ref 0–0.2)
BILIRUB SERPL-MCNC: 0.57 MG/DL (ref 0.2–1)
BILIRUB UR QL STRIP: NEGATIVE
BUN SERPL-MCNC: 25 MG/DL (ref 5–25)
CALCIUM SERPL-MCNC: 10 MG/DL (ref 8.3–10.1)
CHLORIDE SERPL-SCNC: 105 MMOL/L (ref 100–108)
CLARITY UR: CLEAR
CO2 SERPL-SCNC: 29 MMOL/L (ref 21–32)
COLOR UR: YELLOW
CREAT SERPL-MCNC: 0.88 MG/DL (ref 0.6–1.3)
EOSINOPHIL # BLD AUTO: 0.28 THOUSAND/ΜL (ref 0–0.61)
EOSINOPHIL NFR BLD AUTO: 3 % (ref 0–6)
ERYTHROCYTE [DISTWIDTH] IN BLOOD BY AUTOMATED COUNT: 15.5 % (ref 11.6–15.1)
GFR SERPL CREATININE-BSD FRML MDRD: 60 ML/MIN/1.73SQ M
GLUCOSE SERPL-MCNC: 108 MG/DL (ref 65–140)
GLUCOSE UR STRIP-MCNC: NEGATIVE MG/DL
HCT VFR BLD AUTO: 44.8 % (ref 34.8–46.1)
HGB BLD-MCNC: 14.4 G/DL (ref 11.5–15.4)
HGB UR QL STRIP.AUTO: NEGATIVE
IMM GRANULOCYTES # BLD AUTO: 0.02 THOUSAND/UL (ref 0–0.2)
IMM GRANULOCYTES NFR BLD AUTO: 0 % (ref 0–2)
KETONES UR STRIP-MCNC: NEGATIVE MG/DL
LEUKOCYTE ESTERASE UR QL STRIP: NEGATIVE
LYMPHOCYTES # BLD AUTO: 2.88 THOUSANDS/ΜL (ref 0.6–4.47)
LYMPHOCYTES NFR BLD AUTO: 31 % (ref 14–44)
MCH RBC QN AUTO: 29.3 PG (ref 26.8–34.3)
MCHC RBC AUTO-ENTMCNC: 32.1 G/DL (ref 31.4–37.4)
MCV RBC AUTO: 91 FL (ref 82–98)
MONOCYTES # BLD AUTO: 0.88 THOUSAND/ΜL (ref 0.17–1.22)
MONOCYTES NFR BLD AUTO: 10 % (ref 4–12)
NEUTROPHILS # BLD AUTO: 5.19 THOUSANDS/ΜL (ref 1.85–7.62)
NEUTS SEG NFR BLD AUTO: 55 % (ref 43–75)
NITRITE UR QL STRIP: NEGATIVE
NRBC BLD AUTO-RTO: 0 /100 WBCS
PH UR STRIP.AUTO: 5.5 [PH]
PLATELET # BLD AUTO: 206 THOUSANDS/UL (ref 149–390)
PMV BLD AUTO: 11.2 FL (ref 8.9–12.7)
POTASSIUM SERPL-SCNC: 4.4 MMOL/L (ref 3.5–5.3)
PROT SERPL-MCNC: 7 G/DL (ref 6.4–8.2)
PROT UR STRIP-MCNC: NEGATIVE MG/DL
RBC # BLD AUTO: 4.92 MILLION/UL (ref 3.81–5.12)
SODIUM SERPL-SCNC: 139 MMOL/L (ref 136–145)
SP GR UR STRIP.AUTO: >=1.03 (ref 1–1.03)
UROBILINOGEN UR QL STRIP.AUTO: 0.2 E.U./DL
WBC # BLD AUTO: 9.31 THOUSAND/UL (ref 4.31–10.16)

## 2021-07-11 PROCEDURE — 85025 COMPLETE CBC W/AUTO DIFF WBC: CPT | Performed by: EMERGENCY MEDICINE

## 2021-07-11 PROCEDURE — 80076 HEPATIC FUNCTION PANEL: CPT | Performed by: EMERGENCY MEDICINE

## 2021-07-11 PROCEDURE — 81003 URINALYSIS AUTO W/O SCOPE: CPT | Performed by: EMERGENCY MEDICINE

## 2021-07-11 PROCEDURE — 99283 EMERGENCY DEPT VISIT LOW MDM: CPT

## 2021-07-11 PROCEDURE — 36415 COLL VENOUS BLD VENIPUNCTURE: CPT | Performed by: EMERGENCY MEDICINE

## 2021-07-11 PROCEDURE — 80048 BASIC METABOLIC PNL TOTAL CA: CPT | Performed by: EMERGENCY MEDICINE

## 2021-07-11 PROCEDURE — 99284 EMERGENCY DEPT VISIT MOD MDM: CPT | Performed by: EMERGENCY MEDICINE

## 2021-07-11 PROCEDURE — 1123F ACP DISCUSS/DSCN MKR DOCD: CPT | Performed by: EMERGENCY MEDICINE

## 2021-07-11 NOTE — ED NOTES
Karly Contreras pt family member called requesting update  Notified [facility] the patient was discharged and is waiting for transportation back to [facility] her assisted living home       [facility]  07/11/21 2708

## 2021-07-11 NOTE — ED PROVIDER NOTES
History  Chief Complaint   Patient presents with    Blood in Urine     Patient brought in by EMS for blood in sheets, facility believes she has UTI     HPI patient is a 59-year-old female, referred from a nursing home apparently the patient had blood on her sheets today and they felt she might have a urine tract infection  Patient is essentially asymptomatic  Patient denies any headache there is no chest pain there is no abdominal pain  She denies any urinary bleeding  Patient does not believe she had any bleeding at all  Patient has unfortunately dementia and therefore is limited historian  Nursing home believes the patient may have had blood in her urine  We will evaluate for hematuria at this time  We will evaluate for urinary tract infection  Past medical history of dementia, hyperlipidemia, atrial fibrillation on anticoagulants   Family history noncontributory  Social history, nursing home resident, no history of drug abuse    Prior to Admission Medications   Prescriptions Last Dose Informant Patient Reported?  Taking?   acetaminophen (TYLENOL) 325 mg tablet   No No   Sig: Take 2 tablets (650 mg total) by mouth every 6 (six) hours as needed for mild pain   amLODIPine (NORVASC) 5 mg tablet   No No   Sig: TAKE 1 TABLET DAILY   apixaban (ELIQUIS) 5 mg   No No   Sig: Take 1 tablet (5 mg total) by mouth 2 (two) times a day   atorvastatin (LIPITOR) 20 mg tablet   No No   Sig: Take 1 tablet (20 mg total) by mouth daily   cyanocobalamin (VITAMIN B-12) 1,000 mcg tablet   No No   Sig: Take 1 tablet (1,000 mcg total) by mouth daily   donepezil (ARICEPT) 10 mg tablet   No No   Sig: Take 1 tablet (10 mg total) by mouth daily at bedtime   lisinopril-hydrochlorothiazide (PRINZIDE,ZESTORETIC) 20-12 5 MG per tablet   No No   Sig: Take 1 tablet by mouth 2 (two) times a day   metoprolol tartrate (LOPRESSOR) 25 mg tablet   No No   Sig: Take 1 tablet (25 mg total) by mouth 2 (two) times a day   potassium chloride (KLOR-CON M20) 20 mEq tablet   No No   Sig: Take 1 tablet (20 mEq total) by mouth daily for 180 days      Facility-Administered Medications: None       Past Medical History:   Diagnosis Date    Essential hypertension 4/11/2021    Late onset Alzheimer's disease without behavioral disturbance ([facility] [address] ) 4/11/2021    Mixed hyperlipidemia 4/11/2021    Persistent atrial fibrillation ([facility] [address] ) 4/11/2021       Past Surgical History:   Procedure Laterality Date    APPENDECTOMY      CATARACT EXTRACTION, BILATERAL Bilateral     EYE SURGERY      Cataract    HIATAL HERNIA REPAIR      Anterior Gastropexy    RENAL MASS EXCISION Right     Cryosurgical Ablation    TONSILECTOMY AND ADNOIDECTOMY      TONSILLECTOMY         Family History   Problem Relation Age of Onset    Dementia Mother     Heart disease Brother         Arteriosclerotic Cardiovascular     I have reviewed and agree with the history as documented  E-Cigarette/Vaping     E-Cigarette/Vaping Substances     Social History     Tobacco Use    Smoking status: Never Smoker    Smokeless tobacco: Never Used   Substance Use Topics    Alcohol use: Not Currently    Drug use: No       Review of Systems   Constitutional: Negative for diaphoresis, fatigue and fever  HENT: Negative for congestion, ear pain, nosebleeds and sore throat  Eyes: Negative for photophobia, pain, discharge and visual disturbance  Respiratory: Negative for cough, choking, chest tightness, shortness of breath and wheezing  Cardiovascular: Negative for chest pain and palpitations  Gastrointestinal: Negative for abdominal distention, abdominal pain, diarrhea and vomiting  Genitourinary: Positive for hematuria  Negative for dysuria, flank pain and frequency  Musculoskeletal: Negative for back pain, gait problem and joint swelling  Skin: Negative for color change and rash  Neurological: Negative for dizziness, syncope and headaches     Psychiatric/Behavioral: Negative for behavioral problems and confusion  The patient is not nervous/anxious  All other systems reviewed and are negative  Physical Exam  Physical Exam  Vitals and nursing note reviewed  Constitutional:       Appearance: She is well-developed  HENT:      Head: Normocephalic  Right Ear: External ear normal       Left Ear: External ear normal       Nose: Nose normal    Eyes:      General: Lids are normal       Pupils: Pupils are equal, round, and reactive to light  Cardiovascular:      Rate and Rhythm: Normal rate and regular rhythm  Pulses: Normal pulses  Heart sounds: Normal heart sounds  Pulmonary:      Effort: Pulmonary effort is normal  No respiratory distress  Abdominal:      General: Abdomen is flat  Bowel sounds are normal  There is no distension  Comments: With nursing present we did an extensive exam of the patient's perineal area, there was no rectal bleeding, rectal exam showed heme-negative stool, interpreted by me   Control was positive  There is no vaginal bleeding  There is no sign of any hematuria  Patient had no blood in her perineal region all period there were no lacerations or anything that would cause bleeding  Exam was performed with nursing and technicians present  Musculoskeletal:         General: No deformity  Normal range of motion  Cervical back: Normal range of motion and neck supple  Skin:     General: Skin is warm and dry  Neurological:      Mental Status: She is alert and oriented to person, place, and time         Pulse oximetry 98% on room air adequate oxygenation, there is no hypoxia  Vital Signs  ED Triage Vitals   Temperature Pulse Respirations Blood Pressure SpO2   07/11/21 1449 07/11/21 1449 07/11/21 1449 07/11/21 1449 07/11/21 1449   97 6 °F (36 4 °C) 80 18 115/63 98 %      Temp Source Heart Rate Source Patient Position - Orthostatic VS BP Location FiO2 (%)   07/11/21 1449 07/11/21 1623 07/11/21 1623 07/11/21 1623 --   Oral Monitor Lying Right arm       Pain Score       --                  Vitals:    07/11/21 1449 07/11/21 1623   BP: 115/63 142/70   Pulse: 80 96   Patient Position - Orthostatic VS:  Lying         Visual Acuity      ED Medications  Medications - No data to display    Diagnostic Studies  Results Reviewed     Procedure Component Value Units Date/Time    Basic metabolic panel [[id number]] Collected: 07/11/21 1513    Lab Status: Final result Specimen: Blood from Arm, Left Updated: 07/11/21 1530     Sodium 139 mmol/L      Potassium 4 4 mmol/L      Chloride 105 mmol/L      CO2 29 mmol/L      ANION GAP 5 mmol/L      BUN 25 mg/dL      Creatinine 0 88 mg/dL      Glucose 108 mg/dL      Calcium 10 0 mg/dL      eGFR 60 ml/min/1 73sq m     Narrative:      [place] guidelines for Chronic Kidney Disease (CKD):     Stage 1 with normal or high GFR (GFR > 90 mL/min/1 73 square meters)    Stage 2 Mild CKD (GFR = 60-89 mL/min/1 73 square meters)    Stage 3A Moderate CKD (GFR = 45-59 mL/min/1 73 square meters)    Stage 3B Moderate CKD (GFR = 30-44 mL/min/1 73 square meters)    Stage 4 Severe CKD (GFR = 15-29 mL/min/1 73 square meters)    Stage 5 End Stage CKD (GFR <15 mL/min/1 73 square meters)  Note: GFR calculation is accurate only with a steady state creatinine    Hepatic function panel [[id number]]  (Abnormal) Collected: 07/11/21 1513    Lab Status: Final result Specimen: Blood from Arm, Left Updated: 07/11/21 1530     Total Bilirubin 0 57 mg/dL      Bilirubin, Direct 0 14 mg/dL      Alkaline Phosphatase 85 U/L      AST 14 U/L      ALT 25 U/L      Total Protein 7 0 g/dL      Albumin 3 3 g/dL     CBC and differential [[id number]]  (Abnormal) Collected: 07/11/21 1513    Lab Status: Final result Specimen: Blood from Arm, Left Updated: 07/11/21 1519     WBC 9 31 Thousand/uL      RBC 4 92 Million/uL      Hemoglobin 14 4 g/dL      Hematocrit 44 8 %      MCV 91 fL      MCH 29 3 pg      MCHC 32 1 g/dL      RDW 15 5 %      MPV 11 2 fL Platelets 130 Thousands/uL      nRBC 0 /100 WBCs      Neutrophils Relative 55 %      Immat GRANS % 0 %      Lymphocytes Relative 31 %      Monocytes Relative 10 %      Eosinophils Relative 3 %      Basophils Relative 1 %      Neutrophils Absolute 5 19 Thousands/µL      Immature Grans Absolute 0 02 Thousand/uL      Lymphocytes Absolute 2 88 Thousands/µL      Monocytes Absolute 0 88 Thousand/µL      Eosinophils Absolute 0 28 Thousand/µL      Basophils Absolute 0 06 Thousands/µL     UA w Reflex to Microscopic w Reflex to Culture [[id number]] Collected: 07/11/21 1458    Lab Status: Final result Specimen: Urine, Clean Catch Updated: 07/11/21 1504     Color, UA Yellow     Clarity, UA Clear     Specific Gravity, UA >=1 030     pH, UA 5 5     Leukocytes, UA Negative     Nitrite, UA Negative     Protein, UA Negative mg/dl      Glucose, UA Negative mg/dl      Ketones, UA Negative mg/dl      Urobilinogen, UA 0 2 E U /dl      Bilirubin, UA Negative     Blood, UA Negative                 No orders to display              Procedures  Procedures         ED Course        diagnostic testing showed normal electrolytes normal BUN creatinine normal renal function   Liver functions were normal no sign of hepatitis herpetic injury      three no sign of inflammation   Hemoglobin was normal at 14 no sign of anemia  Urinalysis produced completely clear urine there was no hematuria here in the emergency department  there was no sign of a urinary tract infection no white cells            SBIRT 22yo+      Most Recent Value   SBIRT (22 yo +)   In order to provide better care to our patients, we are screening all of our patients for alcohol and drug use  Would it be okay to ask you these screening questions? Yes Filed at: 07/11/2021 1506   Initial Alcohol Screen: US AUDIT-C    1  How often do you have a drink containing alcohol?  0 Filed at: 07/11/2021 1506   2   How many drinks containing alcohol do you have on a typical day you are drinking? 0 Filed at: 07/11/2021 1506   3a  Male UNDER 65: How often do you have five or more drinks on one occasion? 0 Filed at: 07/11/2021 1506   3b  FEMALE Any Age, or MALE 65+: How often do you have 4 or more drinks on one occassion? 0 Filed at: 07/11/2021 1506   Audit-C Score  0 Filed at: 07/11/2021 1506   CHLOE: How many times in the past year have you    Used an illegal drug or used a prescription medication for non-medical reasons? Never Filed at: 07/11/2021 1506                    [facility] medical decision making 60-year-old female presents emergency department, history of blood on her sheets this morning staff was concerned she had hematuria  Patient has no hematuria here  There is no obvious source of bleeding  Patient will require further diagnostic testing such as urology follow-up for hematuria that now appears resolved  Patient remained asymptomatic while in the emergency department  Disposition  Final diagnoses:   Hematuria - By history     Time reflects when diagnosis was documented in both MDM as applicable and the Disposition within this note     Time User Action Codes Description Comment    7/11/2021  4:05 PM Sanjana Sciara [R31 [id number]] Hematuria     7/11/2021  4:05 PM Masha Leaks [R31 [id number]] Hematuria By history      ED Disposition     ED Disposition Condition Date/Time Comment    Discharge Stable Sun Jul 11, 2021  4:05 PM Greta Jones discharge to home/self care              Follow-up Information     Follow up With Specialties Details Why Mary Lou Ferrara MD Urology   [address]  [phone]            Discharge Medication List as of 7/11/2021  4:06 PM      CONTINUE these medications which have NOT CHANGED    Details   acetaminophen (TYLENOL) 325 mg tablet Take 2 tablets (650 mg total) by mouth every 6 (six) hours as needed for mild pain, Starting Sat 3/20/2021, Print      amLODIPine (NORVASC) 5 mg tablet TAKE 1 TABLET DAILY, Normal      apixaban (ELIQUIS) 5 mg Take 1 tablet (5 mg total) by mouth 2 (two) times a day, Starting Thu 6/28/2018, Print      atorvastatin (LIPITOR) 20 mg tablet Take 1 tablet (20 mg total) by mouth daily, Starting Thu 6/28/2018, Print      cyanocobalamin (VITAMIN B-12) 1,000 mcg tablet Take 1 tablet (1,000 mcg total) by mouth daily, Starting Thu 6/28/2018, Print      donepezil (ARICEPT) 10 mg tablet Take 1 tablet (10 mg total) by mouth daily at bedtime, Starting Thu 6/28/2018, Print      lisinopril-hydrochlorothiazide (PRINZIDE,ZESTORETIC) 20-12 5 MG per tablet Take 1 tablet by mouth 2 (two) times a day, Starting Thu 6/28/2018, Print      metoprolol tartrate (LOPRESSOR) 25 mg tablet Take 1 tablet (25 mg total) by mouth 2 (two) times a day, Starting Thu 6/28/2018, Print      potassium chloride (KLOR-CON M20) 20 mEq tablet Take 1 tablet (20 mEq total) by mouth daily for 180 days, Starting Thu 6/28/2018, Until Tue 12/25/2018, Print           No discharge procedures on file      PDMP Review       Value Time User    PDMP Reviewed  Yes 3/20/2021 10:10 AM FARHAT Hanson          ED Provider  Electronically Signed by           Chelsi Silverio MD  07/12/21 6033

## 2021-12-23 ENCOUNTER — NURSING HOME VISIT (OUTPATIENT)
Dept: FAMILY MEDICINE CLINIC | Facility: CLINIC | Age: 86
End: 2021-12-23
Payer: MEDICARE
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
## 2021-12-23 DIAGNOSIS — R26.9 GAIT ABNORMALITY: ICD-10-CM

## 2021-12-23 PROCEDURE — 99335 PR DOM/R-HOME E/M EST PT LW MOD SEVERITY 25 MINUTES: CPT | Performed by: FAMILY MEDICINE

## 2022-02-01 ENCOUNTER — HOSPITAL ENCOUNTER (EMERGENCY)
Facility: HOSPITAL | Age: 87
Discharge: HOME | DRG: 092 | End: 2022-02-01
Attending: EMERGENCY MEDICINE | Admitting: EMERGENCY MEDICINE
Payer: MEDICARE

## 2022-02-01 ENCOUNTER — APPOINTMENT (EMERGENCY)
Dept: CT IMAGING | Facility: HOSPITAL | Age: 87
DRG: 092 | End: 2022-02-01
Payer: MEDICARE

## 2022-02-01 ENCOUNTER — APPOINTMENT (EMERGENCY)
Dept: RADIOLOGY | Facility: HOSPITAL | Age: 87
DRG: 092 | End: 2022-02-01
Payer: MEDICARE

## 2022-02-01 VITALS
RESPIRATION RATE: 20 BRPM | DIASTOLIC BLOOD PRESSURE: 75 MMHG | OXYGEN SATURATION: 94 % | TEMPERATURE: 98.1 F | SYSTOLIC BLOOD PRESSURE: 130 MMHG | HEART RATE: 98 BPM

## 2022-02-01 DIAGNOSIS — R31.9 HEMATURIA: ICD-10-CM

## 2022-02-01 DIAGNOSIS — R53.1 GENERALIZED WEAKNESS: Primary | ICD-10-CM

## 2022-02-01 LAB
2HR DELTA HS TROPONIN: 1 NG/L
4HR DELTA HS TROPONIN: 3 NG/L
ALBUMIN SERPL BCP-MCNC: 3.3 G/DL (ref 3.5–5)
ALP SERPL-CCNC: 124 U/L (ref 46–116)
ALT SERPL W P-5'-P-CCNC: 43 U/L (ref 12–78)
AMORPH URATE CRY URNS QL MICRO: ABNORMAL /HPF
ANION GAP SERPL CALCULATED.3IONS-SCNC: 10 MMOL/L (ref 4–13)
AST SERPL W P-5'-P-CCNC: 78 U/L (ref 5–45)
ATRIAL RATE: 103 BPM
BACTERIA UR QL AUTO: ABNORMAL /HPF
BASOPHILS # BLD AUTO: 0.06 THOUSANDS/ΜL (ref 0–0.1)
BASOPHILS NFR BLD AUTO: 1 % (ref 0–1)
BILIRUB SERPL-MCNC: 1.37 MG/DL (ref 0.2–1)
BILIRUB UR QL STRIP: NEGATIVE
BUN SERPL-MCNC: 29 MG/DL (ref 5–25)
CALCIUM ALBUM COR SERPL-MCNC: 11.4 MG/DL (ref 8.3–10.1)
CALCIUM SERPL-MCNC: 10.8 MG/DL (ref 8.3–10.1)
CARDIAC TROPONIN I PNL SERPL HS: 10 NG/L
CARDIAC TROPONIN I PNL SERPL HS: 7 NG/L
CARDIAC TROPONIN I PNL SERPL HS: 8 NG/L
CHLORIDE SERPL-SCNC: 101 MMOL/L (ref 100–108)
CLARITY UR: ABNORMAL
CO2 SERPL-SCNC: 24 MMOL/L (ref 21–32)
COLOR UR: YELLOW
CREAT SERPL-MCNC: 0.82 MG/DL (ref 0.6–1.3)
EOSINOPHIL # BLD AUTO: 0.03 THOUSAND/ΜL (ref 0–0.61)
EOSINOPHIL NFR BLD AUTO: 0 % (ref 0–6)
ERYTHROCYTE [DISTWIDTH] IN BLOOD BY AUTOMATED COUNT: 15 % (ref 11.6–15.1)
GFR SERPL CREATININE-BSD FRML MDRD: 65 ML/MIN/1.73SQ M
GLUCOSE SERPL-MCNC: 101 MG/DL (ref 65–140)
GLUCOSE UR STRIP-MCNC: NEGATIVE MG/DL
HCT VFR BLD AUTO: 44.8 % (ref 34.8–46.1)
HGB BLD-MCNC: 14.9 G/DL (ref 11.5–15.4)
HGB UR QL STRIP.AUTO: ABNORMAL
IMM GRANULOCYTES # BLD AUTO: 0.05 THOUSAND/UL (ref 0–0.2)
IMM GRANULOCYTES NFR BLD AUTO: 0 % (ref 0–2)
KETONES UR STRIP-MCNC: ABNORMAL MG/DL
LEUKOCYTE ESTERASE UR QL STRIP: NEGATIVE
LYMPHOCYTES # BLD AUTO: 1.57 THOUSANDS/ΜL (ref 0.6–4.47)
LYMPHOCYTES NFR BLD AUTO: 13 % (ref 14–44)
MCH RBC QN AUTO: 29.7 PG (ref 26.8–34.3)
MCHC RBC AUTO-ENTMCNC: 33.3 G/DL (ref 31.4–37.4)
MCV RBC AUTO: 89 FL (ref 82–98)
MONOCYTES # BLD AUTO: 1.07 THOUSAND/ΜL (ref 0.17–1.22)
MONOCYTES NFR BLD AUTO: 9 % (ref 4–12)
NEUTROPHILS # BLD AUTO: 9.4 THOUSANDS/ΜL (ref 1.85–7.62)
NEUTS SEG NFR BLD AUTO: 77 % (ref 43–75)
NITRITE UR QL STRIP: NEGATIVE
NON-SQ EPI CELLS URNS QL MICRO: ABNORMAL /HPF
NRBC BLD AUTO-RTO: 0 /100 WBCS
PH UR STRIP.AUTO: 5.5 [PH]
PLATELET # BLD AUTO: 251 THOUSANDS/UL (ref 149–390)
PMV BLD AUTO: 11.7 FL (ref 8.9–12.7)
POTASSIUM SERPL-SCNC: 5.4 MMOL/L (ref 3.5–5.3)
PROT SERPL-MCNC: 7.6 G/DL (ref 6.4–8.2)
PROT UR STRIP-MCNC: ABNORMAL MG/DL
QRS AXIS: 18 DEGREES
QRSD INTERVAL: 90 MS
QT INTERVAL: 328 MS
QTC INTERVAL: 421 MS
RBC # BLD AUTO: 5.01 MILLION/UL (ref 3.81–5.12)
RBC #/AREA URNS AUTO: ABNORMAL /HPF
SODIUM SERPL-SCNC: 135 MMOL/L (ref 136–145)
SP GR UR STRIP.AUTO: >=1.03 (ref 1–1.03)
T WAVE AXIS: -10 DEGREES
UROBILINOGEN UR QL STRIP.AUTO: 0.2 E.U./DL
VENTRICULAR RATE: 99 BPM
WBC # BLD AUTO: 12.18 THOUSAND/UL (ref 4.31–10.16)
WBC #/AREA URNS AUTO: ABNORMAL /HPF

## 2022-02-01 PROCEDURE — 71045 X-RAY EXAM CHEST 1 VIEW: CPT

## 2022-02-01 PROCEDURE — 93005 ELECTROCARDIOGRAM TRACING: CPT

## 2022-02-01 PROCEDURE — 36415 COLL VENOUS BLD VENIPUNCTURE: CPT

## 2022-02-01 PROCEDURE — 81001 URINALYSIS AUTO W/SCOPE: CPT | Performed by: EMERGENCY MEDICINE

## 2022-02-01 PROCEDURE — 84484 ASSAY OF TROPONIN QUANT: CPT | Performed by: EMERGENCY MEDICINE

## 2022-02-01 PROCEDURE — 99285 EMERGENCY DEPT VISIT HI MDM: CPT

## 2022-02-01 PROCEDURE — 99285 EMERGENCY DEPT VISIT HI MDM: CPT | Performed by: EMERGENCY MEDICINE

## 2022-02-01 PROCEDURE — 80053 COMPREHEN METABOLIC PANEL: CPT | Performed by: EMERGENCY MEDICINE

## 2022-02-01 PROCEDURE — 85025 COMPLETE CBC W/AUTO DIFF WBC: CPT | Performed by: EMERGENCY MEDICINE

## 2022-02-01 PROCEDURE — 93010 ELECTROCARDIOGRAM REPORT: CPT | Performed by: INTERNAL MEDICINE

## 2022-02-01 PROCEDURE — 70450 CT HEAD/BRAIN W/O DYE: CPT

## 2022-02-01 NOTE — DISCHARGE INSTRUCTIONS
Uma's exam and testing was reassuring  She does not appear to have a urine infection, we did not blood and this may need to be follow up by Urology  We send the urine for culture, if anything grows that needs to be treated we will reach out  We ambulated Esteban Biggs and she appears to be walking with a walker at her baseline  She is very motivated to go home  If anything should change please have her return to the ER  We are always here and always happy to re-evaluate!

## 2022-02-01 NOTE — ED NOTES
Sarah Vickers from [facility] called  time changed to 2130       Yenifer January RN  02/01/22 [id number]

## 2022-02-01 NOTE — ED PROVIDER NOTES
History  Chief Complaint   Patient presents with    Weakness - Generalized     pt sent from nursing facility for increased weakness, r/o uti  recent falls - "sliding out of chair onto floor"  denies head strike  hx of dementia  81 yo female with h/o HTN, HLD, afib on a/c, alzheimer's presents with concern for increased weakness and UTI from nursing facility  Facility also reports pt with "sliding off chair onto floor"  No head strike or LOC  No seizure activity  Pt denies pain complaint, vomiting, diarrhea, new numbness/tingling or weakness  She states " I don't know why they sent me here "      Per nursing home, this morning pt with fall x 2, no h/o unilateral weakness, no associate trauma, no head strike or LOC, no seizure but subsequent difficulty ambulating at baseline  No h/o change in medication, systemic complaints or change in routine  No sick contacts  History provided by:  Medical records, patient and nursing home   used: No        Prior to Admission Medications   Prescriptions Last Dose Informant Patient Reported?  Taking?   acetaminophen (TYLENOL) 325 mg tablet   No No   Sig: Take 2 tablets (650 mg total) by mouth every 6 (six) hours as needed for mild pain   amLODIPine (NORVASC) 5 mg tablet   No No   Sig: TAKE 1 TABLET DAILY   apixaban (ELIQUIS) 5 mg   No No   Sig: Take 1 tablet (5 mg total) by mouth 2 (two) times a day   atorvastatin (LIPITOR) 20 mg tablet   No No   Sig: Take 1 tablet (20 mg total) by mouth daily   cyanocobalamin (VITAMIN B-12) 1,000 mcg tablet   No No   Sig: Take 1 tablet (1,000 mcg total) by mouth daily   donepezil (ARICEPT) 10 mg tablet   No No   Sig: Take 1 tablet (10 mg total) by mouth daily at bedtime   lisinopril-hydrochlorothiazide (PRINZIDE,ZESTORETIC) 20-12 5 MG per tablet   No No   Sig: Take 1 tablet by mouth 2 (two) times a day   metoprolol tartrate (LOPRESSOR) 25 mg tablet   No No   Sig: Take 1 tablet (25 mg total) by mouth 2 (two) times a day   potassium chloride (KLOR-CON M20) 20 mEq tablet   No No   Sig: Take 1 tablet (20 mEq total) by mouth daily for 180 days      Facility-Administered Medications: None       Past Medical History:   Diagnosis Date    Essential hypertension 4/11/2021    Late onset Alzheimer's disease without behavioral disturbance ([facility] ) 4/11/2021    Mixed hyperlipidemia 4/11/2021    Persistent atrial fibrillation ([facility] ) 4/11/2021       Past Surgical History:   Procedure Laterality Date    APPENDECTOMY      CATARACT EXTRACTION, BILATERAL Bilateral     EYE SURGERY      Cataract    HIATAL HERNIA REPAIR      Anterior Gastropexy    RENAL MASS EXCISION Right     Cryosurgical Ablation    TONSILECTOMY AND ADNOIDECTOMY      TONSILLECTOMY         Family History   Problem Relation Age of Onset    Dementia Mother     Heart disease Brother         Arteriosclerotic Cardiovascular     I have reviewed and agree with the history as documented  E-Cigarette/Vaping     E-Cigarette/Vaping Substances     Social History     Tobacco Use    Smoking status: Never Smoker    Smokeless tobacco: Never Used   Substance Use Topics    Alcohol use: Not Currently    Drug use: No       Review of Systems   Constitutional: Negative for chills and fever  HENT: Negative for congestion, rhinorrhea and sore throat  Respiratory: Negative for cough and shortness of breath  Cardiovascular: Negative for chest pain  Gastrointestinal: Negative for abdominal pain, diarrhea, nausea and vomiting  Genitourinary: Negative for dysuria, flank pain and urgency  Skin: Negative for rash  Neurological: Negative for dizziness, weakness, numbness and headaches  All other systems reviewed and are negative  Physical Exam  Physical Exam  Vitals and nursing note reviewed  Constitutional:       General: She is not in acute distress  Appearance: She is well-developed  She is obese   She is not ill-appearing, toxic-appearing or diaphoretic  HENT:      Head: Normocephalic and atraumatic  Nose: No congestion  Eyes:      General: No scleral icterus  Right eye: No discharge  Left eye: No discharge  Extraocular Movements: Extraocular movements intact  Conjunctiva/sclera: Conjunctivae normal       Pupils: Pupils are equal, round, and reactive to light  Cardiovascular:      Rate and Rhythm: Normal rate and regular rhythm  Heart sounds: Normal heart sounds  No murmur heard  Pulmonary:      Effort: Pulmonary effort is normal  No respiratory distress  Breath sounds: Normal breath sounds  Abdominal:      General: Abdomen is flat  Palpations: Abdomen is soft  Tenderness: There is no abdominal tenderness  Musculoskeletal:         General: No deformity  Normal range of motion  Cervical back: Normal range of motion  Skin:     General: Skin is warm and dry  Capillary Refill: Capillary refill takes less than 2 seconds  Neurological:      Mental Status: She is alert  Mental status is at baseline  She is disoriented  GCS: GCS eye subscore is 4  GCS verbal subscore is 5  GCS motor subscore is 6  Cranial Nerves: No cranial nerve deficit  Sensory: No sensory deficit  Motor: Motor function is intact  Coordination: Coordination normal       Gait: Gait normal       Deep Tendon Reflexes: Babinski sign absent on the right side  Babinski sign absent on the left side  Comments: No nystagmus   Psychiatric:         Attention and Perception: Attention normal          Mood and Affect: Mood and affect normal          Speech: Speech normal          Behavior: Behavior normal  Behavior is cooperative  Thought Content: Thought content does not include homicidal or suicidal ideation  Thought content does not include suicidal plan  Cognition and Memory: Cognition is impaired  Memory is impaired  Judgment: Judgment is inappropriate           Vital Signs  ED Triage Vitals [02/01/22 0832]   Temperature Pulse Respirations Blood Pressure SpO2   98 1 °F (36 7 °C) (!) 46 18 152/74 98 %      Temp src Heart Rate Source Patient Position - Orthostatic VS BP Location FiO2 (%)   -- Monitor Sitting Right arm --      Pain Score       --           Vitals:    02/01/22 0832   BP: 152/74   Pulse: (!) 46   Patient Position - Orthostatic VS: Sitting         Visual Acuity      ED Medications  Medications - No data to display    Diagnostic Studies  Results Reviewed     Procedure Component Value Units Date/Time    CBC and differential [[id number]]  (Abnormal) Collected: 02/01/22 0857    Lab Status: Final result Specimen: Blood from Arm, Right Updated: 02/01/22 0908     WBC 12 18 Thousand/uL      RBC 5 01 Million/uL      Hemoglobin 14 9 g/dL      Hematocrit 44 8 %      MCV 89 fL      MCH 29 7 pg      MCHC 33 3 g/dL      RDW 15 0 %      MPV 11 7 fL      Platelets 672 Thousands/uL      nRBC 0 /100 WBCs      Neutrophils Relative 77 %      Immat GRANS % 0 %      Lymphocytes Relative 13 %      Monocytes Relative 9 %      Eosinophils Relative 0 %      Basophils Relative 1 %      Neutrophils Absolute 9 40 Thousands/µL      Immature Grans Absolute 0 05 Thousand/uL      Lymphocytes Absolute 1 57 Thousands/µL      Monocytes Absolute 1 07 Thousand/µL      Eosinophils Absolute 0 03 Thousand/µL      Basophils Absolute 0 06 Thousands/µL     Comprehensive metabolic panel [[id number]] Collected: 02/01/22 0857    Lab Status: In process Specimen: Blood from Arm, Right Updated: 02/01/22 0905    HS Troponin 0hr (reflex protocol) [[id number]] Collected: 02/01/22 0857    Lab Status:  In process Specimen: Blood from Arm, Right Updated: 02/01/22 0905                 No orders to display              Procedures  ECG 12 Lead Documentation Only    Date/Time: 2/1/2022 4:37 PM  Performed by: Matthew Hernadez MD  Authorized by: Matthew Hernadez MD     Indications / Diagnosis:  Falls  ECG reviewed by me, the ED Provider: yes    Patient location:  ED  Previous ECG:     Previous ECG:  Compared to current    Similarity:  No change    Comparison to cardiac monitor: Yes    Interpretation:     Interpretation: abnormal    Rate:     ECG rate:  99 BPM    ECG rate assessment: normal    Rhythm:     Rhythm: atrial fibrillation    Ectopy:     Ectopy: none    QRS:     QRS axis:  Normal  Conduction:     Conduction: normal    ST segments:     ST segments:  Non-specific  Comments:      No STEMI             ED Course  ED Course as of 02/01/22 1634   Tue Feb 01, 2022   0920 CBC and differential(!)  Mild leukocytosis with left shift, no banding, normal h/h/ and plts   0938 Comprehensive metabolic panel(!)  Minimally low sodium, minimally elevated potassium (slightly hemolyzed), increased BUN, mildly elevated Ca2+, minimally elevated AST and alk phos   0939 Pulse(!): 46  No longer sindy in room, HR consistently 80's-90's throughout interview   1150 HS Troponin I 2hr  wnl   1215 XR chest 1 view portable  IMPRESSION:     No acute disease in the chest                  Workstation performed: [id number]   2697 CT head without contrast  IMPRESSION:     No acute intracranial abnormality                  Workstation performed: [id number]             Specimen Collected: 02/01/22 12:41           1349 UA w Reflex to Microscopic w Reflex to Culture(!)  Hematuria, trace ketones, no LE or nitries   1430 Urine Microscopic(!)  Hematuria without significant pyuria, moderate bacteria   1440 Discussed with Alex Gatica who is amenable tot having patient go home  1444 HS Troponin I 4hr  wnl                                             MDM  Number of Diagnoses or Management Options  Generalized weakness  Hematuria  Diagnosis management comments: 79 yo female with HTN, HLD, dementia p/w falls and generalized weakness x 1 day  No associated traumat complaint  Appears to ambulate in ED at baseline  No obvious infectious or ischemic sources    Pt is very pleasant and motivated to go home  Work up discussed in real time with pts POA who is comfortable with discharge  Amount and/or Complexity of Data Reviewed  Clinical lab tests: ordered and reviewed  Tests in the radiology section of CPT®: ordered and reviewed  Tests in the medicine section of CPT®: ordered and reviewed  Obtain history from someone other than the patient: yes  Review and summarize past medical records: yes  Independent visualization of images, tracings, or specimens: yes    Risk of Complications, Morbidity, and/or Mortality  Presenting problems: moderate  Diagnostic procedures: moderate  Management options: moderate    Patient Progress  Patient progress: improved      Disposition  Final diagnoses:   None     ED Disposition     None      Follow-up Information    None         Patient's Medications   Discharge Prescriptions    No medications on file       No discharge procedures on file      PDMP Review       Value Time User    PDMP Reviewed  Yes 3/20/2021 10:10 AM 4958 FARHAT Schultz          ED Provider  Electronically Signed by           Bibi Maher MD  02/01/22 0760

## 2022-02-01 NOTE — ED NOTES
PATIENT ROLLED AND LINENS CHANGED  REPOSITIONED FOR COMFORT  NAD  WILL CONT TO MONITOR       Aniket Maldonado RN  02/01/22 4289

## 2022-02-01 NOTE — ED NOTES
Damaris to  patient for transport back to SNF at [phone]  Primary RN made aware     Venus Mcnulty, BILLY  02/01/22 7564

## 2022-02-02 ENCOUNTER — APPOINTMENT (EMERGENCY)
Dept: RADIOLOGY | Facility: HOSPITAL | Age: 87
DRG: 092 | End: 2022-02-02
Payer: MEDICARE

## 2022-02-02 ENCOUNTER — HOSPITAL ENCOUNTER (INPATIENT)
Facility: HOSPITAL | Age: 87
LOS: 3 days | Discharge: NURSING, REHAB OR LONG-TERM CARE | DRG: 092 | End: 2022-02-06
Attending: EMERGENCY MEDICINE | Admitting: INTERNAL MEDICINE
Payer: MEDICARE

## 2022-02-02 DIAGNOSIS — R26.2 AMBULATORY DYSFUNCTION: Primary | ICD-10-CM

## 2022-02-02 DIAGNOSIS — I48.91 ATRIAL FIBRILLATION, UNSPECIFIED TYPE (HCC): ICD-10-CM

## 2022-02-02 DIAGNOSIS — L03.116 LEFT LEG CELLULITIS: ICD-10-CM

## 2022-02-02 DIAGNOSIS — M16.9 HIP OSTEOARTHRITIS: ICD-10-CM
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
## 2022-02-02 LAB
2HR DELTA HS TROPONIN: 0 NG/L
4HR DELTA HS TROPONIN: 0 NG/L
ALBUMIN SERPL BCP-MCNC: 3.1 G/DL (ref 3.5–5)
ALP SERPL-CCNC: 112 U/L (ref 46–116)
ALT SERPL W P-5'-P-CCNC: 49 U/L (ref 12–78)
AMORPH URATE CRY URNS QL MICRO: NORMAL /HPF
ANION GAP SERPL CALCULATED.3IONS-SCNC: 6 MMOL/L (ref 4–13)
AST SERPL W P-5'-P-CCNC: 73 U/L (ref 5–45)
BACTERIA UR QL AUTO: NORMAL /HPF
BASOPHILS # BLD AUTO: 0.07 THOUSANDS/ΜL (ref 0–0.1)
BASOPHILS NFR BLD AUTO: 1 % (ref 0–1)
BILIRUB SERPL-MCNC: 1.02 MG/DL (ref 0.2–1)
BILIRUB UR QL STRIP: NEGATIVE
BUN SERPL-MCNC: 31 MG/DL (ref 5–25)
CALCIUM ALBUM COR SERPL-MCNC: 10.7 MG/DL (ref 8.3–10.1)
CALCIUM SERPL-MCNC: 10 MG/DL (ref 8.3–10.1)
CARDIAC TROPONIN I PNL SERPL HS: 10 NG/L
CHLORIDE SERPL-SCNC: 103 MMOL/L (ref 100–108)
CLARITY UR: CLEAR
CO2 SERPL-SCNC: 27 MMOL/L (ref 21–32)
COLOR UR: YELLOW
CREAT SERPL-MCNC: 0.9 MG/DL (ref 0.6–1.3)
EOSINOPHIL # BLD AUTO: 0.47 THOUSAND/ΜL (ref 0–0.61)
EOSINOPHIL NFR BLD AUTO: 4 % (ref 0–6)
ERYTHROCYTE [DISTWIDTH] IN BLOOD BY AUTOMATED COUNT: 15.5 % (ref 11.6–15.1)
GFR SERPL CREATININE-BSD FRML MDRD: 58 ML/MIN/1.73SQ M
GLUCOSE SERPL-MCNC: 88 MG/DL (ref 65–140)
GLUCOSE UR STRIP-MCNC: NEGATIVE MG/DL
HCT VFR BLD AUTO: 42.3 % (ref 34.8–46.1)
HGB BLD-MCNC: 13.9 G/DL (ref 11.5–15.4)
HGB UR QL STRIP.AUTO: ABNORMAL
IMM GRANULOCYTES # BLD AUTO: 0.03 THOUSAND/UL (ref 0–0.2)
IMM GRANULOCYTES NFR BLD AUTO: 0 % (ref 0–2)
INR PPP: 2.09 (ref 0.84–1.19)
KETONES UR STRIP-MCNC: ABNORMAL MG/DL
LEUKOCYTE ESTERASE UR QL STRIP: ABNORMAL
LYMPHOCYTES # BLD AUTO: 2.91 THOUSANDS/ΜL (ref 0.6–4.47)
LYMPHOCYTES NFR BLD AUTO: 28 % (ref 14–44)
MCH RBC QN AUTO: 29.8 PG (ref 26.8–34.3)
MCHC RBC AUTO-ENTMCNC: 32.9 G/DL (ref 31.4–37.4)
MCV RBC AUTO: 91 FL (ref 82–98)
MONOCYTES # BLD AUTO: 1.33 THOUSAND/ΜL (ref 0.17–1.22)
MONOCYTES NFR BLD AUTO: 13 % (ref 4–12)
NEUTROPHILS # BLD AUTO: 5.78 THOUSANDS/ΜL (ref 1.85–7.62)
NEUTS SEG NFR BLD AUTO: 54 % (ref 43–75)
NITRITE UR QL STRIP: NEGATIVE
NON-SQ EPI CELLS URNS QL MICRO: NORMAL /HPF
NRBC BLD AUTO-RTO: 0 /100 WBCS
PH UR STRIP.AUTO: 5.5 [PH] (ref 4.5–8)
PLATELET # BLD AUTO: 234 THOUSANDS/UL (ref 149–390)
PMV BLD AUTO: 11.5 FL (ref 8.9–12.7)
POTASSIUM SERPL-SCNC: 4.3 MMOL/L (ref 3.5–5.3)
PROT SERPL-MCNC: 7.1 G/DL (ref 6.4–8.2)
PROT UR STRIP-MCNC: NEGATIVE MG/DL
PROTHROMBIN TIME: 23.1 SECONDS (ref 11.6–14.5)
RBC # BLD AUTO: 4.67 MILLION/UL (ref 3.81–5.12)
RBC #/AREA URNS AUTO: NORMAL /HPF
SODIUM SERPL-SCNC: 136 MMOL/L (ref 136–145)
SP GR UR STRIP.AUTO: >=1.03 (ref 1–1.03)
TSH SERPL DL<=0.05 MIU/L-ACNC: 1.69 UIU/ML (ref 0.36–3.74)
UROBILINOGEN UR QL STRIP.AUTO: 1 E.U./DL
WBC # BLD AUTO: 10.59 THOUSAND/UL (ref 4.31–10.16)
WBC #/AREA URNS AUTO: NORMAL /HPF

## 2022-02-02 PROCEDURE — 84443 ASSAY THYROID STIM HORMONE: CPT

## 2022-02-02 PROCEDURE — 99220 PR INITIAL OBSERVATION CARE/DAY 70 MINUTES: CPT | Performed by: INTERNAL MEDICINE

## 2022-02-02 PROCEDURE — 85610 PROTHROMBIN TIME: CPT

## 2022-02-02 PROCEDURE — 80053 COMPREHEN METABOLIC PANEL: CPT | Performed by: EMERGENCY MEDICINE

## 2022-02-02 PROCEDURE — 93005 ELECTROCARDIOGRAM TRACING: CPT

## 2022-02-02 PROCEDURE — 99285 EMERGENCY DEPT VISIT HI MDM: CPT

## 2022-02-02 PROCEDURE — 99285 EMERGENCY DEPT VISIT HI MDM: CPT | Performed by: EMERGENCY MEDICINE

## 2022-02-02 PROCEDURE — 84484 ASSAY OF TROPONIN QUANT: CPT | Performed by: EMERGENCY MEDICINE

## 2022-02-02 PROCEDURE — 85025 COMPLETE CBC W/AUTO DIFF WBC: CPT | Performed by: EMERGENCY MEDICINE

## 2022-02-02 PROCEDURE — 81001 URINALYSIS AUTO W/SCOPE: CPT

## 2022-02-02 PROCEDURE — 72170 X-RAY EXAM OF PELVIS: CPT

## 2022-02-02 PROCEDURE — 97163 PT EVAL HIGH COMPLEX 45 MIN: CPT

## 2022-02-02 PROCEDURE — 36415 COLL VENOUS BLD VENIPUNCTURE: CPT | Performed by: EMERGENCY MEDICINE

## 2022-02-02 RX ORDER — AMLODIPINE BESYLATE 5 MG/1
5 TABLET ORAL DAILY
Status: DISCONTINUED | OUTPATIENT
Start: 2022-02-03 | End: 2022-02-03

## 2022-02-02 RX ORDER — WARFARIN SODIUM 3 MG/1
TABLET ORAL
COMMUNITY

## 2022-02-02 RX ORDER — ACETAMINOPHEN 325 MG/1
650 TABLET ORAL EVERY 6 HOURS PRN
Status: DISCONTINUED | OUTPATIENT
Start: 2022-02-02 | End: 2022-02-02

## 2022-02-02 RX ORDER — POTASSIUM CHLORIDE 20 MEQ/1
20 TABLET, EXTENDED RELEASE ORAL DAILY
Status: DISCONTINUED | OUTPATIENT
Start: 2022-02-03 | End: 2022-02-06 | Stop reason: HOSPADM

## 2022-02-02 RX ORDER — ACETAMINOPHEN 325 MG/1
975 TABLET ORAL EVERY 8 HOURS SCHEDULED
Status: DISCONTINUED | OUTPATIENT
Start: 2022-02-02 | End: 2022-02-06 | Stop reason: HOSPADM

## 2022-02-02 RX ORDER — WARFARIN SODIUM 3 MG/1
3 TABLET ORAL
Status: DISCONTINUED | OUTPATIENT
Start: 2022-02-02 | End: 2022-02-06 | Stop reason: HOSPADM

## 2022-02-02 RX ORDER — ATORVASTATIN CALCIUM 20 MG/1
20 TABLET, FILM COATED ORAL DAILY
Status: DISCONTINUED | OUTPATIENT
Start: 2022-02-03 | End: 2022-02-06 | Stop reason: HOSPADM

## 2022-02-02 RX ORDER — DONEPEZIL HYDROCHLORIDE 5 MG/1
10 TABLET, FILM COATED ORAL
Status: DISCONTINUED | OUTPATIENT
Start: 2022-02-02 | End: 2022-02-06 | Stop reason: HOSPADM

## 2022-02-02 RX ADMIN — WARFARIN SODIUM 3 MG: 3 TABLET ORAL at 22:02

## 2022-02-02 RX ADMIN — METOPROLOL TARTRATE 25 MG: 25 TABLET, FILM COATED ORAL at 20:24

## 2022-02-02 RX ADMIN — HYDROCHLOROTHIAZIDE: 12.5 TABLET ORAL at 20:23

## 2022-02-02 RX ADMIN — ACETAMINOPHEN 975 MG: 325 TABLET, FILM COATED ORAL at 22:02

## 2022-02-02 RX ADMIN — DICLOFENAC SODIUM 2 G: 10 GEL TOPICAL at 22:02

## 2022-02-02 RX ADMIN — DONEPEZIL HYDROCHLORIDE 10 MG: 5 TABLET, FILM COATED ORAL at 22:02

## 2022-02-02 NOTE — PHYSICAL THERAPY NOTE
PHYSICAL THERAPY EVALUATION  NAME: Leonela Gill  AGE:   80 y o  MRN:  [id number]  ADMIT DX: Weakness [R53 1]    PMH:   Past Medical History:   Diagnosis Date    Essential hypertension 2021    Late onset [facility]'s disease without behavioral disturbance ([facility] ) 2021    Mixed hyperlipidemia 2021    Persistent atrial fibrillation ([facility] ) 2021     LENGTH OF STAY: 0       22 1510   PT Last Visit   PT Visit Date 22   Note Type   Note type Evaluation   Pain Assessment   Pain Assessment Tool 0-10   Pain Score No Pain   Restrictions/Precautions   Weight Bearing Precautions Per Order No   Home Living   Type of Home Assisted living  ([facility])   Home Layout One level   Home Equipment Walker   Additional Comments Ambulates ~40` with mod I and RW at baseline  Prior Function   Level of [place] Independent with ADLs and functional mobility   Lives With Facility staff   ADL Assistance Independent   IADLs Needs assistance   Falls in the last 6 months   (~ 2 falls in the last 2 days per resident)   General   Family/Caregiver Present No   Cognition   Overall Cognitive Status Impaired   Arousal/Participation Cooperative   Orientation Level Oriented to person;Oriented to place; Disoriented to time;Disoriented to situation   Memory Decreased recall of recent events;Decreased short term memory;Decreased recall of precautions   Following Commands Follows one step commands with increased time or repetition   Comments Pt identified by name and   Subjective   Subjective Agrees to PT evaluation  RLE Assessment   RLE Assessment X   Strength RLE   RLE Overall Strength 4-/5  (functionally)   LLE Assessment   LLE Assessment X   Strength LLE   LLE Overall Strength 2/5  (functionally)   Bed Mobility   Supine to Sit 3  Moderate assistance   Additional items Assist x 1;HOB elevated; Bedrails; Increased time required;Verbal cues;LE management   Sit to Supine 2  Maximal assistance   Additional items Assist x 1; Increased time required;Verbal cues;LE management   Transfers   Sit to Stand 3  Moderate assistance   Additional items Assist x 1; Increased time required;Verbal cues   Stand to Sit 3  Moderate assistance   Additional items Assist x 1; Increased time required;Verbal cues   Additional Comments from stretcher; height discrepancy   Ambulation/Elevation   Gait pattern Improper Weight shift; Forward Flexion;Decreased foot clearance; Short stride; Excessively slow   Gait Assistance 3  Moderate assist   Additional items Assist x 1;Verbal cues   Assistive Device Rolling walker   Distance x4 lateral steps along EOB   (seated rest break after 2 steps)   Balance   Static Sitting Fair -   Dynamic Sitting Poor +   Static Standing Poor +   Dynamic Standing Poor   Ambulatory Poor   Endurance Deficit   Endurance Deficit Yes   Endurance Deficit Description limited ambulation distance, fatigue   Activity Tolerance   Activity Tolerance Patient limited by fatigue   Nurse Made Aware Per RN,  pt appropriate to evaluate   Assessment   Prognosis Guarded   Problem List Decreased strength;Decreased endurance; Impaired balance;Decreased mobility; Decreased cognition; Impaired judgement;Decreased safety awareness; Impaired hearing;Obesity   Goals   Patient Goals to do my job   STG Expiration Date 02/12/22   Short Term Goal #1 Pt will be able to: (1) perform bed mobility with min A to promote upright posture and OOB mobility (2) perform sit to stand with min A to decrease burden of care (3) ambulate at least 40` with min A and RW to increase activity tolerance (4) increase standing balance by 1 grade to decrease risk of falls    PT Treatment Day 0   Plan   Treatment/Interventions Functional transfer training;LE strengthening/ROM; Therapeutic exercise; Endurance training;Cognitive reorientation;Patient/family training;Equipment eval/education; Bed mobility;Gait training   PT Frequency 3-5x/wk   Recommendation   PT Discharge Recommendation Post acute rehabilitation services   Equipment Recommended [facility] Recommended Wheeled walker   AM-PAC Basic Mobility Inpatient   Turning in Bed Without Bedrails 2   Lying on Back to Sitting on Edge of Flat Bed 2   Moving Bed to Chair 2   Standing Up From Chair 2   Walk in Room 2   Climb 3-5 Stairs 1   Basic Mobility Inpatient Raw Score 11   Basic Mobility Standardized Score 30 25   Highest Level Of Mobility   JH-HL Goal 4: Move to chair/commode   JH-HLM Highest Level of Mobility 5: Stand (1 or more minutes)   JH-HL Goal Achieved Yes   End of Consult   Patient Position at End of Consult Supine; All needs within reach   The patient's AM-PAC Basic Mobility Inpatient Short Form Raw Score is 11  A Raw score of less than or equal to 16 suggests the patient may benefit from discharge to post-acute rehabilitation services  Please also refer to the recommendation of the Physical Therapist for safe discharge planning  Assessment: Pt is a 80 y o  female seen for PT evaluation s/p admit to [address] [place] [address] on 2/2/2022 w/ <principal problem not specified>  Order placed for PT  Comorbidities affecting pt's physical performance at time of assessment include: HTN, obesity and limited cognition  Personal factors affecting pt at time of IE include: advanced age, inability to perform ADLs, inability to ambulate household distances, limited insight into impairments and recent fall(s)  Prior to admission, pt was was independent w/ all functional mobility w/ RW, lived in one floor environment and lives at [address] [place] [address]  Upon evaluation: Pt requires mod A for bed mobility, mod A for sit to stand, and mod A for lateral steps along EOB with RW  (Please find full objective findings from PT assessment regarding body systems outlined above)   Impairments and limitations also listed above, especially due to  weakness, impaired balance, decreased endurance, gait deviations, decreased activity tolerance, decreased safety awareness, impaired judgement, fall risk and decreased cognition  Pt's clinical presentation is currently unstable/unpredictable seen in pt's presentation of fall risk, poor safety awareness, significant decline in functional mobility, and limited insight into deficits  Pt to benefit from continued skilled PT tx while in hospital and upon DC to address deficits as defined above and maximize level of functional mobility  From PT/mobility standpoint, recommendation at time of d/c would be inpatient rehab pending progress in order to maximize pt's functional independence and consistency w/ mobility in order to facilitate return to PLOF  Recommend progression of ambulation and initiation of HEP as appropriate        Madhavi Brandt, PT,DPT

## 2022-02-02 NOTE — PLAN OF CARE
Problem: PHYSICAL THERAPY ADULT  Goal: Performs mobility at highest level of function for planned discharge setting  See evaluation for individualized goals  Description: Treatment/Interventions: Functional transfer training,LE strengthening/ROM,Therapeutic exercise,Endurance training,Cognitive reorientation,Patient/family training,Equipment eval/education,Bed mobility,Gait training  Equipment Recommended: Karlene Suggs       See flowsheet documentation for full assessment, interventions and recommendations  Note: Prognosis: Guarded  Problem List: Decreased strength,Decreased endurance,Impaired balance,Decreased mobility,Decreased cognition,Impaired judgement,Decreased safety awareness,Impaired hearing,Obesity  Assessment: Pt is a 80 y o  female seen for PT evaluation s/p admit to [address] [place] [address] on 2/2/2022 w/ <principal problem not specified>  Order placed for PT  Comorbidities affecting pt's physical performance at time of assessment include: HTN, obesity and limited cognition  Personal factors affecting pt at time of IE include: advanced age, inability to perform ADLs, inability to ambulate household distances, limited insight into impairments and recent fall(s)  Prior to admission, pt was was independent w/ all functional mobility w/ RW, lived in one floor environment and lives at [address] [place] [address]  Upon evaluation: Pt requires mod A for bed mobility, mod A for sit to stand, and mod A for lateral steps along EOB with RW  (Please find full objective findings from PT assessment regarding body systems outlined above)  Impairments and limitations also listed above, especially due to  weakness, impaired balance, decreased endurance, gait deviations, decreased activity tolerance, decreased safety awareness, impaired judgement, fall risk and decreased cognition    Pt's clinical presentation is currently unstable/unpredictable seen in pt's presentation of fall risk, poor safety awareness, significant decline in functional mobility, and limited insight into deficits  Pt to benefit from continued skilled PT tx while in hospital and upon DC to address deficits as defined above and maximize level of functional mobility  From PT/mobility standpoint, recommendation at time of d/c would be inpatient rehab pending progress in order to maximize pt's functional independence and consistency w/ mobility in order to facilitate return to PLOF  Recommend progression of ambulation and initiation of HEP as appropriate  PT Discharge Recommendation: Post acute rehabilitation services          See flowsheet documentation for full assessment

## 2022-02-02 NOTE — ED PROVIDER NOTES
History  Chief Complaint   Patient presents with    Weakness - Generalized     pt has been seen in ER 4 times this week  from [facility]  strong foul urine  pt here yesterday for frequent falls  Kimberly Alfaro presents to the emergency department via EMS from her care facility/nursing home [facility]) after progressive worsening of her ambulatory status according to nursing home staff  Nursing home staff recounts she has a baseline ambulatory status and 40 ft with the use of a walker  They describe yesterday that she 2 falls overnight with a fall in the morning (total of 3 falls) that was sent to the emergency department on February 1st for evaluation  Patient was evaluated and worked up assess to be able to ambulate on her own in the emergency department, and was then returned to the care facility  Care facility staff then expressed that she was unable to ambulate at her baseline when returning to the facility  They attempted to keep an eye on her overnight and provide assistance as needed, however they left required material/devices to help with lifting  They also expressed the did not have the staff to continually watched her given her increased ambulatory dysfunction and propensity for falls  It was decided today (February 2nd) to return to the emergency department for continued evaluation of ambulatory dysfunction  Patient has a baseline level of dementia  Patient was able to respond appropriately to majority of questions but was also hard of hearing as well  Patient expressed that she was not subjectively feeling any pain and does not recall any of the falls  Patient denies any episodes of head strike/head trauma  No visible signs of head trauma were appreciated during interview of the patient  Patient denies any difficulty urinating, or changes in bowel movements, chest pain, shortness of breath, abdominal pain, changes in vision, or changes in hearing    She expressed I do not have any pain anywhere  After discussion with staff at the nursing home, they also expressed that she has not had any fall since returning from the emergency department on February 1st   The expressed the patient has had no acute complaints of pain  The staff expressed a predominant focus on their inability to provide adequate level of care/safe level of care given her decrease in ambulatory function  History provided by:  Patient, EMS personnel and nursing home  History limited by:  Dementia   used: No        Prior to Admission Medications   Prescriptions Last Dose Informant Patient Reported?  Taking?   acetaminophen (TYLENOL) 325 mg tablet Past Week at Unknown time  No Yes   Sig: Take 2 tablets (650 mg total) by mouth every 6 (six) hours as needed for mild pain   amLODIPine (NORVASC) 5 mg tablet 2/2/2022 at Unknown time  No Yes   Sig: TAKE 1 TABLET DAILY   apixaban (ELIQUIS) 5 mg Not Taking at Unknown time  No No   Sig: Take 1 tablet (5 mg total) by mouth 2 (two) times a day   Patient not taking: Reported on 2/2/2022    atorvastatin (LIPITOR) 20 mg tablet 2/2/2022 at Unknown time  No Yes   Sig: Take 1 tablet (20 mg total) by mouth daily   cyanocobalamin (VITAMIN B-12) 1,000 mcg tablet 2/2/2022 at Unknown time  No Yes   Sig: Take 1 tablet (1,000 mcg total) by mouth daily   donepezil (ARICEPT) 10 mg tablet 2/2/2022 at Unknown time  No Yes   Sig: Take 1 tablet (10 mg total) by mouth daily at bedtime   lisinopril-hydrochlorothiazide (PRINZIDE,ZESTORETIC) 20-12 5 MG per tablet 2/2/2022 at Unknown time  No Yes   Sig: Take 1 tablet by mouth 2 (two) times a day   metoprolol tartrate (LOPRESSOR) 25 mg tablet 2/2/2022 at Unknown time  No Yes   Sig: Take 1 tablet (25 mg total) by mouth 2 (two) times a day   potassium chloride (KLOR-CON M20) 20 mEq tablet 2/2/2022 at Unknown time  No Yes   Sig: Take 1 tablet (20 mEq total) by mouth daily for 180 days   warfarin (COUMADIN) 3 mg tablet   Yes Yes Sig: Take by mouth daily      Facility-Administered Medications: None       Past Medical History:   Diagnosis Date    Essential hypertension 4/11/2021    Late onset Alzheimer's disease without behavioral disturbance ([facility] [address] [facility] ) 4/11/2021    Mixed hyperlipidemia 4/11/2021    [facility] 4/11/2021       Past Surgical History:   Procedure Laterality Date    APPENDECTOMY      CATARACT EXTRACTION, BILATERAL Bilateral     EYE SURGERY      Cataract    HIATAL HERNIA REPAIR      Anterior Gastropexy    RENAL MASS EXCISION Right     Cryosurgical Ablation    TONSILECTOMY AND ADNOIDECTOMY      TONSILLECTOMY         Family History   Problem Relation Age of Onset    Dementia Mother     Heart disease Brother         Arteriosclerotic Cardiovascular     I have reviewed and agree with the history as documented  E-Cigarette/Vaping     E-Cigarette/Vaping Substances     Social History     Tobacco Use    Smoking status: Never Smoker    Smokeless tobacco: Never Used   Substance Use Topics    Alcohol use: Not Currently    Drug use: No        Review of Systems   Constitutional: Negative for chills and fever  HENT: Negative for ear pain and sore throat  Eyes: Negative for pain and visual disturbance  Respiratory: Negative for cough and shortness of breath  Cardiovascular: Negative for chest pain and palpitations  Gastrointestinal: Negative for abdominal pain and vomiting  Genitourinary: Negative for dysuria and hematuria  Musculoskeletal: Negative for arthralgias and back pain  Skin: Negative for color change and rash  Neurological: Negative for seizures and syncope  All other systems reviewed and are negative        Physical Exam  ED Triage Vitals   Temperature Pulse Respirations Blood Pressure SpO2   02/02/22 1321 02/02/22 1321 02/02/22 1321 02/02/22 1321 02/02/22 1321   98 2 °F (36 8 °C) (!) 109 16 122/78 98 %      Temp Source Heart Rate Source Patient Position - Orthostatic VS BP Location FiO2 (%)   02/02/22 1731 02/02/22 1321 02/02/22 1321 02/02/22 1321 --   Oral Monitor Lying Right arm       Pain Score       02/02/22 1510       No Pain             Orthostatic Vital Signs  Vitals:    02/02/22 1500 02/02/22 1600 02/02/22 1700 02/02/22 1731   BP:    115/63   Pulse: 92 94 90 89   Patient Position - Orthostatic VS:    Lying       Physical Exam  Vitals and nursing note reviewed  Constitutional:       Appearance: She is well-developed  She is obese  HENT:      Head: Normocephalic and atraumatic  Right Ear: External ear normal       Left Ear: External ear normal       Nose: Nose normal       Mouth/Throat:      Mouth: Mucous membranes are moist       Pharynx: No oropharyngeal exudate or posterior oropharyngeal erythema  Eyes:      Conjunctiva/sclera: Conjunctivae normal    Cardiovascular:      Rate and Rhythm: Normal rate and regular rhythm  Heart sounds: No murmur heard  Pulmonary:      Effort: Pulmonary effort is normal  No respiratory distress  Breath sounds: Normal breath sounds  Abdominal:      Palpations: Abdomen is soft  Tenderness: There is no abdominal tenderness  Musculoskeletal:         General: No tenderness, deformity or signs of injury  Normal range of motion  Cervical back: Neck supple  Right lower leg: No deformity, lacerations, tenderness or bony tenderness  2+ Edema present  Left lower leg: No deformity, lacerations, tenderness or bony tenderness  2+ Edema present  Comments: Muscle contractions appreciated with attempts to lift left leg  Unable to raise leg of the bed  Plantar flexion and dorsiflexion intact  Skin:     General: Skin is warm and dry  Capillary Refill: Capillary refill takes less than 2 seconds  Neurological:      General: No focal deficit present  Mental Status: She is alert and oriented to person, place, and time  Sensory: No sensory deficit        Coordination: Coordination normal  ED Medications  Medications - No data to display    Diagnostic Studies  Results Reviewed     Procedure Component Value Units Date/Time    HS Troponin I 4hr [[id number]]  (Normal) Collected: 02/02/22 1751    Lab Status: Final result Specimen: Blood from Hand, Left Updated: 02/02/22 1830     hs TnI 4hr 10 ng/L      Delta 4hr hsTnI 0 ng/L     HS Troponin I 2hr [[id number]]  (Normal) Collected: 02/02/22 1714    Lab Status: Final result Specimen: Blood from Hand, Left Updated: 02/02/22 1758     hs TnI 2hr 10 ng/L      Delta 2hr hsTnI 0 ng/L     Urine Microscopic [[id number]] Collected: 02/02/22 1538    Lab Status: Final result Specimen: Urine, Clean Catch Updated: 02/02/22 1629     RBC, UA 0-1 /hpf      WBC, UA 2-4 /hpf      Epithelial Cells Occasional /hpf      Bacteria, UA Occasional /hpf      AMORPH URATES Occasional /hpf     Urine Macroscopic, POC [[id number]]  (Abnormal) Collected: 02/02/22 1538    Lab Status: Final result Specimen: Urine Updated: 02/02/22 1540     Color, UA Yellow     Clarity, UA Clear     pH, UA 5 5     Leukocytes, UA Trace     Nitrite, UA Negative     Protein, UA Negative mg/dl      Glucose, UA Negative mg/dl      Ketones, UA 40 (2+) mg/dl      Urobilinogen, UA 1 0 E U /dl      Bilirubin, UA Negative     Blood, UA Trace     Specific Gravity, UA >=1 030    Narrative:      CLINITEK RESULT    TSH [[id number]]  (Normal) Collected: 02/02/22 1416    Lab Status: Final result Specimen: Blood from Arm, Left Updated: 02/02/22 1524     TSH 3RD GENERATON 1 686 uIU/mL     Narrative:      Patients undergoing fluorescein dye angiography may retain small amounts of fluorescein in the body for 48-72 hours post procedure  Samples containing fluorescein can produce falsely depressed TSH values  If the patient had this procedure,a specimen should be resubmitted post fluorescein clearance        HS Troponin 0hr (reflex protocol) [[id number]]  (Normal) Collected: 02/02/22 1412    Lab Status: Final result Specimen: Blood from Arm, Left Updated: 02/02/22 1512     hs TnI 0hr 10 ng/L     Comprehensive metabolic panel [[id number]]  (Abnormal) Collected: 02/02/22 1412    Lab Status: Final result Specimen: Blood from Arm, Left Updated: 02/02/22 1509     Sodium 136 mmol/L      Potassium 4 3 mmol/L      Chloride 103 mmol/L      CO2 27 mmol/L      ANION GAP 6 mmol/L      BUN 31 mg/dL      Creatinine 0 90 mg/dL      Glucose 88 mg/dL      Calcium 10 0 mg/dL      Corrected Calcium 10 7 mg/dL      AST 73 U/L      ALT 49 U/L      Alkaline Phosphatase 112 U/L      Total Protein 7 1 g/dL      Albumin 3 1 g/dL      Total Bilirubin 1 02 mg/dL      eGFR 58 ml/min/1 73sq m     Narrative:      [place] guidelines for Chronic Kidney Disease (CKD):     Stage 1 with normal or high GFR (GFR > 90 mL/min/1 73 square meters)    Stage 2 Mild CKD (GFR = 60-89 mL/min/1 73 square meters)    Stage 3A Moderate CKD (GFR = 45-59 mL/min/1 73 square meters)    Stage 3B Moderate CKD (GFR = 30-44 mL/min/1 73 square meters)    Stage 4 Severe CKD (GFR = 15-29 mL/min/1 73 square meters)    Stage 5 End Stage CKD (GFR <15 mL/min/1 73 square meters)  Note: GFR calculation is accurate only with a steady state creatinine    CBC and differential [[id number]]  (Abnormal) Collected: 02/02/22 1412    Lab Status: Final result Specimen: Blood from Arm, Left Updated: 02/02/22 1441     WBC 10 59 Thousand/uL      RBC 4 67 Million/uL      Hemoglobin 13 9 g/dL      Hematocrit 42 3 %      MCV 91 fL      MCH 29 8 pg      MCHC 32 9 g/dL      RDW 15 5 %      MPV 11 5 fL      Platelets 259 Thousands/uL      nRBC 0 /100 WBCs      Neutrophils Relative 54 %      Immat GRANS % 0 %      Lymphocytes Relative 28 %      Monocytes Relative 13 %      Eosinophils Relative 4 %      Basophils Relative 1 %      Neutrophils Absolute 5 78 Thousands/µL      Immature Grans Absolute 0 03 Thousand/uL      Lymphocytes Absolute 2 91 Thousands/µL      Monocytes Absolute 1 33 Thousand/µL      Eosinophils Absolute 0 47 Thousand/µL      Basophils Absolute 0 07 Thousands/µL                  XR pelvis ap only 1 or 2 views   Final Result by Yazmin Mendoza DO (02/02 1641)      No acute osseous abnormality identified on the single provided view  Workstation performed: [id number]               Procedures  ECG 12 Lead Documentation Only    Date/Time: 2/2/2022 2:31 PM  Performed by: Alta Man MD  Authorized by: Alta Man MD     ECG reviewed by me, the ED Provider: yes    Patient location:  ED  Previous ECG:     Previous ECG:  Compared to current    Similarity:  No change    Comparison to cardiac monitor: No    Interpretation:     Interpretation: abnormal    Rate:     ECG rate:  92    ECG rate assessment: normal    Rhythm:     Rhythm: atrial fibrillation    Ectopy:     Ectopy: none    QRS:     QRS axis:  Normal  Conduction:     Conduction: normal    ST segments:     ST segments:  Normal  T waves:     T waves: non-specific            ED Course                                       MDM  Number of Diagnoses or Management Options  Ambulatory dysfunction: new and requires workup  Left leg cellulitis: new and requires workup  Diagnosis management comments: Endy Quiroz comes to the ED after increasing ambulatory dysfunction while at her nursing home facility  Staff expressed that she was seen the previous day in the emergency department and was sent home, but patient is still experiencing/manifesting ambulatory dysfunction is outside the level of care that they can provide  Based on initial presentation to the emergency department, initial laboratory studies were repeated from yesterday's (February 1st) evaluation:    CBC was unremarkable  CMP was unremarkable  TSH was unremarkable  Troponin was unremarkable  ECG was unchanged when compared to ECG from previous day  Chronic AFib    Nonspecific T-wave abnormalities with no signs of ischemic changes or additional arrhythmias  Based off of her inability to ambulate at home and her physical exam in the emergency department, PT was notified for evaluation of the patient for her ambulatory dysfunction  After evaluation by PT, it was decided that the patient would require placement in rehab and was not able to be safely discharged  Imaging of the pelvis was conducted after evaluation with PT, no signs of acute intraosseous pathology  This case was discussed with the inpatient team with regards to an observation stay for continued evaluation of Luma Herrera and potential rehab placement  After discussion with the inpatient he moves decided to bring the patient into the hospital for an observation stay  Disposition:  Observation admission to the hospital for continued evaluation of ambulatory dysfunction         Amount and/or Complexity of Data Reviewed  Clinical lab tests: ordered and reviewed  Tests in the radiology section of CPT®: ordered and reviewed  Discussion of test results with the performing providers: yes  Obtain history from someone other than the patient: yes  Review and summarize past medical records: yes  Discuss the patient with other providers: yes  Independent visualization of images, tracings, or specimens: yes    Risk of Complications, Morbidity, and/or Mortality  Presenting problems: moderate  Diagnostic procedures: moderate  Management options: moderate    Patient Progress  Patient progress: stable      Disposition  Final diagnoses:   Ambulatory dysfunction   Left leg cellulitis     Time reflects when diagnosis was documented in both MDM as applicable and the Disposition within this note     Time User Action Codes Description Comment    2/2/2022  4:39 PM Sky Jones Add [R26 2] Ambulatory dysfunction     2/2/2022  4:39 PM Sky Jones Add [L03 116] Left leg cellulitis       ED Disposition     ED Disposition Condition Date/Time Comment    Admit Stable Wed Feb 2, 2022  4:38 PM Case was discussed with Nereida Chua and the patient's admission status was agreed to be Admission Status: observation status to the service of Dr Zeke Siemens          Follow-up Information    None         Current Discharge Medication List      CONTINUE these medications which have NOT CHANGED    Details   acetaminophen (TYLENOL) 325 mg tablet Take 2 tablets (650 mg total) by mouth every 6 (six) hours as needed for mild pain  Qty: 30 tablet, Refills: 0    Associated Diagnoses: Hematoma      amLODIPine (NORVASC) 5 mg tablet TAKE 1 TABLET DAILY  Qty: 90 tablet, Refills: 3    Associated Diagnoses: Benign essential hypertension      atorvastatin (LIPITOR) 20 mg tablet Take 1 tablet (20 mg total) by mouth daily  Qty: 30 tablet, Refills: 5    Associated Diagnoses: Dyslipidemia      cyanocobalamin (VITAMIN B-12) 1,000 mcg tablet Take 1 tablet (1,000 mcg total) by mouth daily  Qty: 30 tablet, Refills: 5    Associated Diagnoses: Memory deficit      donepezil (ARICEPT) 10 mg tablet Take 1 tablet (10 mg total) by mouth daily at bedtime  Qty: 30 tablet, Refills: 5    Associated Diagnoses: Memory deficit      lisinopril-hydrochlorothiazide (PRINZIDE,ZESTORETIC) 20-12 5 MG per tablet Take 1 tablet by mouth 2 (two) times a day  Qty: 60 tablet, Refills: 5    Associated Diagnoses: Benign essential hypertension      metoprolol tartrate (LOPRESSOR) 25 mg tablet Take 1 tablet (25 mg total) by mouth 2 (two) times a day  Qty: 60 tablet, Refills: 5    Associated Diagnoses: Benign essential hypertension; Atrial fibrillation, unspecified type (HCC)      potassium chloride (KLOR-CON M20) 20 mEq tablet Take 1 tablet (20 mEq total) by mouth daily for 180 days  Qty: 30 tablet, Refills: 5    Associated Diagnoses: Essential hypertension      warfarin (COUMADIN) 3 mg tablet Take by mouth daily      apixaban (ELIQUIS) 5 mg Take 1 tablet (5 mg total) by mouth 2 (two) times a day  Qty: 60 tablet, Refills: 6    Associated Diagnoses: Atrial fibrillation, chronic ([facility] [address] [facility] )           No discharge procedures on file  PDMP Review       Value Time User    PDMP Reviewed  Yes 3/20/2021 10:10 [facility] [address]            ED Provider  Attending physically available and evaluated Christiano Dial I managed the patient along with the ED Attending      Electronically Signed by         Francesca Yang MD  02/02/22 7581

## 2022-02-03 LAB
ALBUMIN SERPL BCP-MCNC: 2.7 G/DL (ref 3.5–5)
ALP SERPL-CCNC: 105 U/L (ref 46–116)
ALT SERPL W P-5'-P-CCNC: 48 U/L (ref 12–78)
ANION GAP SERPL CALCULATED.3IONS-SCNC: 6 MMOL/L (ref 4–13)
AST SERPL W P-5'-P-CCNC: 60 U/L (ref 5–45)
ATRIAL RATE: 258 BPM
BASOPHILS # BLD AUTO: 0.07 THOUSANDS/ΜL (ref 0–0.1)
BASOPHILS NFR BLD AUTO: 1 % (ref 0–1)
BILIRUB SERPL-MCNC: 1.17 MG/DL (ref 0.2–1)
BUN SERPL-MCNC: 26 MG/DL (ref 5–25)
CALCIUM ALBUM COR SERPL-MCNC: 10.8 MG/DL (ref 8.3–10.1)
CALCIUM SERPL-MCNC: 9.8 MG/DL (ref 8.3–10.1)
CHLORIDE SERPL-SCNC: 104 MMOL/L (ref 100–108)
CO2 SERPL-SCNC: 25 MMOL/L (ref 21–32)
CREAT SERPL-MCNC: 0.75 MG/DL (ref 0.6–1.3)
EOSINOPHIL # BLD AUTO: 0.61 THOUSAND/ΜL (ref 0–0.61)
EOSINOPHIL NFR BLD AUTO: 10 % (ref 0–6)
ERYTHROCYTE [DISTWIDTH] IN BLOOD BY AUTOMATED COUNT: 15.3 % (ref 11.6–15.1)
GFR SERPL CREATININE-BSD FRML MDRD: 72 ML/MIN/1.73SQ M
GLUCOSE SERPL-MCNC: 92 MG/DL (ref 65–140)
HCT VFR BLD AUTO: 41.5 % (ref 34.8–46.1)
HGB BLD-MCNC: 13.6 G/DL (ref 11.5–15.4)
IMM GRANULOCYTES # BLD AUTO: 0.01 THOUSAND/UL (ref 0–0.2)
IMM GRANULOCYTES NFR BLD AUTO: 0 % (ref 0–2)
INR PPP: 2.11 (ref 0.84–1.19)
LYMPHOCYTES # BLD AUTO: 1.73 THOUSANDS/ΜL (ref 0.6–4.47)
LYMPHOCYTES NFR BLD AUTO: 27 % (ref 14–44)
MCH RBC QN AUTO: 29.6 PG (ref 26.8–34.3)
MCHC RBC AUTO-ENTMCNC: 32.8 G/DL (ref 31.4–37.4)
MCV RBC AUTO: 90 FL (ref 82–98)
MONOCYTES # BLD AUTO: 0.83 THOUSAND/ΜL (ref 0.17–1.22)
MONOCYTES NFR BLD AUTO: 13 % (ref 4–12)
NEUTROPHILS # BLD AUTO: 3.07 THOUSANDS/ΜL (ref 1.85–7.62)
NEUTS SEG NFR BLD AUTO: 49 % (ref 43–75)
NRBC BLD AUTO-RTO: 0 /100 WBCS
PLATELET # BLD AUTO: 216 THOUSANDS/UL (ref 149–390)
PMV BLD AUTO: 11.1 FL (ref 8.9–12.7)
POTASSIUM SERPL-SCNC: 4.1 MMOL/L (ref 3.5–5.3)
PROT SERPL-MCNC: 6.4 G/DL (ref 6.4–8.2)
PROTHROMBIN TIME: 23.3 SECONDS (ref 11.6–14.5)
QRS AXIS: 51 DEGREES
QRSD INTERVAL: 88 MS
QT INTERVAL: 342 MS
QTC INTERVAL: 422 MS
RBC # BLD AUTO: 4.59 MILLION/UL (ref 3.81–5.12)
SODIUM SERPL-SCNC: 135 MMOL/L (ref 136–145)
T WAVE AXIS: -53 DEGREES
VENTRICULAR RATE: 92 BPM
WBC # BLD AUTO: 6.32 THOUSAND/UL (ref 4.31–10.16)

## 2022-02-03 PROCEDURE — 99232 SBSQ HOSP IP/OBS MODERATE 35: CPT | Performed by: INTERNAL MEDICINE

## 2022-02-03 PROCEDURE — 80053 COMPREHEN METABOLIC PANEL: CPT | Performed by: INTERNAL MEDICINE

## 2022-02-03 PROCEDURE — 85610 PROTHROMBIN TIME: CPT | Performed by: INTERNAL MEDICINE

## 2022-02-03 PROCEDURE — 93010 ELECTROCARDIOGRAM REPORT: CPT | Performed by: INTERNAL MEDICINE

## 2022-02-03 PROCEDURE — 87081 CULTURE SCREEN ONLY: CPT | Performed by: INTERNAL MEDICINE

## 2022-02-03 PROCEDURE — 85025 COMPLETE CBC W/AUTO DIFF WBC: CPT | Performed by: INTERNAL MEDICINE

## 2022-02-03 RX ADMIN — ACETAMINOPHEN 975 MG: 325 TABLET, FILM COATED ORAL at 13:45

## 2022-02-03 RX ADMIN — ACETAMINOPHEN 975 MG: 325 TABLET, FILM COATED ORAL at 21:01

## 2022-02-03 RX ADMIN — DICLOFENAC SODIUM 2 G: 10 GEL TOPICAL at 13:46

## 2022-02-03 RX ADMIN — SODIUM CHLORIDE 500 ML: 0.9 INJECTION, SOLUTION INTRAVENOUS at 09:57

## 2022-02-03 RX ADMIN — WARFARIN SODIUM 3 MG: 3 TABLET ORAL at 18:29

## 2022-02-03 RX ADMIN — POTASSIUM CHLORIDE 20 MEQ: 1500 TABLET, EXTENDED RELEASE ORAL at 09:46

## 2022-02-03 RX ADMIN — DICLOFENAC SODIUM 2 G: 10 GEL TOPICAL at 09:48

## 2022-02-03 RX ADMIN — ACETAMINOPHEN 975 MG: 325 TABLET, FILM COATED ORAL at 05:18

## 2022-02-03 RX ADMIN — METOPROLOL TARTRATE 25 MG: 25 TABLET, FILM COATED ORAL at 18:29

## 2022-02-03 RX ADMIN — DONEPEZIL HYDROCHLORIDE 10 MG: 5 TABLET, FILM COATED ORAL at 21:00

## 2022-02-03 RX ADMIN — ATORVASTATIN CALCIUM 20 MG: 20 TABLET, FILM COATED ORAL at 09:46

## 2022-02-03 RX ADMIN — DICLOFENAC SODIUM 2 G: 10 GEL TOPICAL at 18:30

## 2022-02-03 RX ADMIN — DICLOFENAC SODIUM 2 G: 10 GEL TOPICAL at 21:01

## 2022-02-03 RX ADMIN — HYDROCHLOROTHIAZIDE: 12.5 TABLET ORAL at 21:00

## 2022-02-03 RX ADMIN — CYANOCOBALAMIN TAB 500 MCG 1000 MCG: 500 TAB at 09:46

## 2022-02-03 NOTE — ASSESSMENT & PLAN NOTE
Takes warfarin 3 mg daily   INR therapeutic (goal 2-3)    Plan  · Continue home regimen  · Monitor INR

## 2022-02-03 NOTE — ASSESSMENT & PLAN NOTE
POA, due to frequent falls  Nursing team at [facility] senior [facility] report that patient has had gradual deconditioning  Patient fell for the 1st time a few weeks ago, then yesterday fell 3 times in 1 hour, and was brought to ED  Evaluation time was completely normal and was sent back to [employer]  Patient returns due to [facility] facility being unable to provide assistance for patient and requiring rehabilitation placement  On exam, patient has 0/5 strength had left lower extremity with hip flexion  No visible signs of trauma, no tenderness  CXR, CT of head without contrast, and x-ray of hip are all between normal limits      Plan  · PT eval - recommended post acute rehab  · OT eval  · MRI of lumbar spine  · Neurochecks q 4 hours for 24 hours

## 2022-02-03 NOTE — CASE MANAGEMENT
Case Management Assessment & Discharge Planning Note    Patient name Isrrael Brooke  [address] -25 MRN [id number]  : 1935 Date 2/3/2022       Current Admission Date: 2022  Current Admission Diagnosis:Fall   Patient Active Problem List    Diagnosis Date Noted   [facility] Fall 2022    Hyperglycemia 2021    Essential hypertension 2021    Persistent [facility] fibrillation ([facility] [facility] ) 2021    Mixed hyperlipidemia 2021    Late onset Alzheimer's disease without behavioral disturbance ([facility] ) 2021    Hematoma 2021    Memory deficit 2018    Aortic stenosis, mild 2017    Sick sinus syndrome ([facility] ) 2017    Endometrial hyperplasia 2016    Hyperlipidemia, mixed 2015    Atrial fibrillation ([facility] ) 10/01/2014    Benign essential hypertension 2013    [facility] 2013      LOS (days): 0  Geometric Mean LOS (GMLOS) (days): 2 60  Days to GMLOS:2 4     OBJECTIVE:    Risk of Unplanned Readmission Score: 15         Current admission status: Inpatient       Preferred Pharmacy:   [address] [place] [address]  Phone: [phone] Fax: [phone]    Primary Care Provider: Jacob Phillips MD    Primary Insurance: MEDICARE  Secondary Insurance: AARP    ASSESSMENT:  Active Health Care Agents    There are no active Health Care Agents on file         Advance Directives  Does patient have a Health Care POA?: Yes  Does patient have Advance Directives?: Yes  Advance Directives: Living will  Primary Contact: Glenna ritter              Patient Information  Admitted from[de-identified] Facility  Mental Status: Confused  During Assessment patient was accompanied by: Not accompanied during assessment  Assessment information provided by[de-identified] Other - please comment (riya)  Primary Caregiver: Self (pt has been at an assisted living center, recent decline)  Support Systems: Self,Family members  [place] of Residence: Aleta Sanders do you live in?: [address] [facility] facility  Type of Current Residence: Facility  Living Arrangements: Other (Comment) (In an assisted living facility)    Activities of Daily Living Prior to Admission  Functional Status:  (previously independent for mobility with a walker, assistance for ADL)  Completes ADLs independently?: No  Level of ADL dependence: Assistance  Ambulates independently?: Yes  Does patient use assisted devices?: Yes  Assisted Devices (DME) used: [employer]  Does patient currently own DME?: Yes  What DME does the patient currently own?: Timur Fierro  Does patient have a history of Outpatient Therapy (PT/OT)?: No  Does the patient have a history of Short-Term Rehab?: No  Does patient have a history of HHC?: No  Does patient currently have [facility]?: No         Patient Information Continued  Income Source: SSI/SSD  Does patient have prescription coverage?: Yes  Does patient have a history of substance abuse?: No  Does patient have a history of Mental Health Diagnosis?: No         Means of Transportation  Means of Transport to Appts[de-identified] Family transport        DISCHARGE DETAILS:    Discharge planning discussed with[de-identified] [address] of Choice: Yes  Comments - Freedom of Choice: Aayush Mary wishes for pt to go to a facility closer to her in [address] [place] [address], where pt can eventually transition to an [facility] [address], [address]  were mentioned, a blanket referral of those facilities are unable    CM contacted family/caregiver?: Yes  Were Treatment Team discharge recommendations reviewed with patient/caregiver?: Yes  Did patient/caregiver verbalize understanding of patient care needs?: Yes  Were patient/caregiver advised of the risks associated with not following Treatment Team discharge recommendations?: Yes    Contacts  Patient Contacts: Aayush Hernandez  Relationship to Patient[de-identified] Family  Contact Method: Phone  Phone Number: [phone]  Reason/Outcome: Continuity of Care,Referral,Discharge Planning,Emergency Contact                                                                                        Niece states pt previously was walking with a RW and has now fallen 4x within the last 12 hours  Niece is currently taking care of her dad, the pt's brother, who has COVID

## 2022-02-03 NOTE — H&P
[facility]  H&P- Contreras Torrez 1935, 80 y o  female MRN: [id number]  Unit/Bed#: S -01 Encounter: [id number]  Primary Care Provider: Sal Sahu MD   Date and time admitted to hospital: 2/2/2022  1:15 PM    * One Francisco Gutierrez, due to frequent falls  Nursing team at [facility] report that patient has had gradual deconditioning  Patient fell for the 1st time a few weeks ago, then yesterday fell 3 times in 1 hour, and was brought to ED  Evaluation time was completely normal and was sent back to [employer]  Patient returns due to [facility] facility being unable to provide assistance for patient and requiring rehabilitation placement  On exam, patient has 0/5 strength had left lower extremity with hip flexion  No visible signs of trauma, no tenderness  CXR, CT of head without contrast, and x-ray of hip are all between normal limits  Plan  · PT eval - recommended post acute rehab  · OT eval  · MRI of lumbar spine  · Neurochecks q 4 hours for 24 hours    Late onset Alzheimer's disease without behavioral disturbance (HCC)  Assessment & Plan  Home regimen includes Aricept 10 mg at bedtime    Plan  · Continue home regimen    Benign essential hypertension  Assessment & Plan  Home medications include Norvasc 5 mg daily, lisinopril-HCTZ 20/12 5 daily, Lopressor 25 b i d  Plan  · Continue home regimen  · Monitor BP closely    Atrial fibrillation (HCC)  Assessment & Plan  Takes warfarin 3 mg daily    Plan  · Continue home regimen  · A m  INR    VTE Prophylaxis: Warfarin (Coumadin)  / sequential compression device   Code Status:  Level 3  POLST: POLST form is not discussed and not completed at this time  Anticipated Length of Stay:  Patient will be admitted on an Observation basis with an anticipated length of stay of  < 2 midnights     Justification for Hospital Stay:  Frequent falls    Chief Complaint:   Fall    History of Present Illness:    Julian Kwan is a 80 y o  female with past medical history of hypertension, hyperlipidemia, Alzheimer's, and AFib on warfarin, who presents with to frequent falls  Patient lives at [facility]  Nursing team at [facility] report that patient has had gradual deconditioning  Patient fell for the 1st time a few weeks ago, then yesterday fell 3 times in 1 hour, and was brought to ED  Evaluation yesterday was completely normal and was sent back to [employer]  Patient returns due to [facility] facility being unable to provide assistance for patient and requiring rehabilitation placement  Patient has some pain required even 3 people to get her out of shower  In ED, CXR, CT of head without contrast, and x-ray of hip are all between normal limits  On evaluation, patient has 0/5 strength had left lower extremity with hip flexion  No visible signs of trauma, no tenderness  Review of Systems:    Review of Systems   Constitutional: Negative for chills and fever  HENT: Negative for ear pain and sore throat  Eyes: Negative for pain and visual disturbance  Respiratory: Negative for cough and shortness of breath  Cardiovascular: Negative for chest pain and palpitations  Gastrointestinal: Negative for abdominal pain and vomiting  Genitourinary: Negative for dysuria and hematuria  Musculoskeletal: Negative for arthralgias and back pain  Skin: Negative for color change and rash  Neurological: Negative for seizures and syncope  All other systems reviewed and are negative        Past Medical and Surgical History:     Past Medical History:   Diagnosis Date    Essential hypertension 4/11/2021    Late onset Alzheimer's disease without behavioral disturbance ([facility] ) 4/11/2021    Mixed hyperlipidemia 4/11/2021    Persistent atrial fibrillation ([facility] ) 4/11/2021       Past Surgical History:   Procedure Laterality Date    APPENDECTOMY      CATARACT EXTRACTION, BILATERAL Bilateral     EYE SURGERY      Cataract    HIATAL HERNIA REPAIR      Anterior Gastropexy    RENAL MASS EXCISION Right     Cryosurgical Ablation    TONSILECTOMY AND ADNOIDECTOMY      TONSILLECTOMY         Meds/Allergies:    Prior to Admission medications    Medication Sig Start Date End Date Taking? Authorizing Provider   acetaminophen (TYLENOL) 325 mg tablet Take 2 tablets (650 mg total) by mouth every 6 (six) hours as needed for mild pain 3/20/21  Yes FARHAT Eid   amLODIPine (NORVASC) 5 mg tablet TAKE 1 TABLET DAILY 8/7/18  Yes Jamil Maloney MD   atorvastatin (LIPITOR) 20 mg tablet Take 1 tablet (20 mg total) by mouth daily 6/28/18  Yes Jamil Maloney MD   cyanocobalamin (VITAMIN B-12) 1,000 mcg tablet Take 1 tablet (1,000 mcg total) by mouth daily 6/28/18  Yes Jamil Maloney MD   donepezil (ARICEPT) 10 mg tablet Take 1 tablet (10 mg total) by mouth daily at bedtime 6/28/18  Yes Jamil Maloney MD   lisinopril-hydrochlorothiazide (PRINZIDE,ZESTORETIC) 20-12 5 MG per tablet Take 1 tablet by mouth 2 (two) times a day 6/28/18  Yes Jamil Maloney MD   metoprolol tartrate (LOPRESSOR) 25 mg tablet Take 1 tablet (25 mg total) by mouth 2 (two) times a day 6/28/18  Yes Jamil Maloney MD   potassium chloride (KLOR-CON M20) 20 mEq tablet Take 1 tablet (20 mEq total) by mouth daily for 180 days 6/28/18 2/2/22 Yes Jamil Maloney MD   warfarin (COUMADIN) 3 mg tablet Take by mouth daily   Yes Historical Provider, MD   apixaban (ELIQUIS) 5 mg Take 1 tablet (5 mg total) by mouth 2 (two) times a day  Patient not taking: Reported on 2/2/2022 6/28/18   Jamil Maloney MD     I have reviewed home medications with a medical source (PCP, Pharmacy, other)  Nursing team at facility      Allergies: No Known Allergies    Social History:     Marital Status: Single   Occupation:  Retired  Patient Pre-hospital Living Situation: :  [facility]  Patient Pre-hospital Level of Mobility:  Complete until recently  Patient Pre-hospital Diet Restrictions:  None  Substance Use History:   Social History     Substance and Sexual Activity   Alcohol Use Not Currently     Social History     Tobacco Use   Smoking Status Never Smoker   Smokeless Tobacco Never Used     Social History     Substance and Sexual Activity   Drug Use No       Family History:    Family History   Problem Relation Age of Onset    Dementia Mother     Heart disease Brother         Arteriosclerotic Cardiovascular       Physical Exam:     Vitals:   Blood Pressure: 115/63 (02/02/22 1731)  Pulse: 89 (02/02/22 1731)  Temperature: 98 4 °F (36 9 °C) (02/02/22 1731)  Temp Source: Oral (02/02/22 1731)  Respirations: 18 (02/02/22 1731)  SpO2: 96 % (02/02/22 1731)    Physical Exam  Vitals and nursing note reviewed  Constitutional:       General: She is not in acute distress  Appearance: Normal appearance  She is obese  She is not ill-appearing or diaphoretic  HENT:      Head: Normocephalic and atraumatic  Mouth/Throat:      Mouth: Mucous membranes are moist       Pharynx: Oropharynx is clear  Eyes:      General: No scleral icterus  Conjunctiva/sclera: Conjunctivae normal    Cardiovascular:      Rate and Rhythm: Normal rate and regular rhythm  Pulses: Normal pulses  Heart sounds: Normal heart sounds  No murmur heard  No gallop  Pulmonary:      Effort: Pulmonary effort is normal  No respiratory distress  Breath sounds: Normal breath sounds  No wheezing or rales  Abdominal:      General: Bowel sounds are normal  There is no distension  Palpations: Abdomen is soft  There is no mass  Tenderness: There is no abdominal tenderness  Musculoskeletal:         General: No tenderness or deformity  Normal range of motion  Cervical back: Normal range of motion and neck supple  Right lower leg: No edema  Left lower leg: No edema  Skin:     General: Skin is warm and dry        Capillary Refill: Capillary refill takes less than 2 seconds  Coloration: Skin is not jaundiced  Neurological:      General: No focal deficit present  Mental Status: She is alert  Mental status is at baseline  Sensory: No sensory deficit  Motor: Weakness (Left lower extremity, 0/5 with hip flexion and extension) present  Comments: Oriented x1 to person   Psychiatric:         Mood and Affect: Mood normal          Behavior: Behavior normal          Thought Content: Thought content normal          Judgment: Judgment normal            Additional Data:     Lab Results: I have personally reviewed pertinent reports  Results from last 7 days   Lab Units 02/02/22  1412   WBC Thousand/uL 10 59*   HEMOGLOBIN g/dL 13 9   HEMATOCRIT % 42 3   PLATELETS Thousands/uL 234   NEUTROS PCT % 54   LYMPHS PCT % 28   MONOS PCT % 13*   EOS PCT % 4     Results from last 7 days   Lab Units 02/02/22  1412   POTASSIUM mmol/L 4 3   CHLORIDE mmol/L 103   CO2 mmol/L 27   BUN mg/dL 31*   CREATININE mg/dL 0 90   CALCIUM mg/dL 10 0   ALK PHOS U/L 112   ALT U/L 49   AST U/L 73*           Imaging: I have personally reviewed pertinent reports  XR chest 1 view portable    Result Date: 2/1/2022  Narrative: CHEST INDICATION:   AMS  COMPARISON:  March 20, 2021  EXAM PERFORMED/VIEWS:  XR CHEST PORTABLE FINDINGS: Cardiomediastinal silhouette appears unremarkable  The lungs are clear  No pneumothorax or pleural effusion  Osseous structures appear within normal limits for patient age  Impression: No acute disease in the chest  Workstation performed: [id number]     CT head without contrast    Result Date: 2/1/2022  Narrative: CT BRAIN - WITHOUT CONTRAST INDICATION:   AMS, falls  "81 yo female with h/o HTN, HLD, afib on a/c, alzheimer's presents with concern for increased weakness and UTI from nursing facility  Facility also reports pt with "sliding off chair onto floor"  No head strike or LOC    No seizure activity  " COMPARISON:  CT head 3/20/2021 TECHNIQUE:  CT examination of the brain was performed  In addition to axial images, sagittal and coronal 2D reformatted images were created and submitted for interpretation  Radiation dose length product (DLP) for this visit:  879 mGy-cm   This examination, like all CT scans performed in the [facility], was performed utilizing techniques to minimize radiation dose exposure, including the use of iterative reconstruction and automated exposure control  IMAGE QUALITY:  Diagnostic  FINDINGS: PARENCHYMA: Decreased attenuation is noted in periventricular and subcortical white matter demonstrating an appearance that is statistically most likely to represent moderate microangiopathic change; this appearance is similar when compared to most recent prior examination  No CT signs of acute infarction  No intracranial mass, mass effect or midline shift  No acute parenchymal hemorrhage  VENTRICLES AND EXTRA-AXIAL SPACES:  Normal for the patient's age  VISUALIZED ORBITS AND PARANASAL SINUSES:  Unremarkable  CALVARIUM AND EXTRACRANIAL SOFT TISSUES:  Normal      Impression: No acute intracranial abnormality  Workstation performed: [id number]     XR pelvis ap only 1 or 2 views    Result Date: 2/2/2022  Narrative: PELVIS INDICATION:   Evaluate for potential fracture, dislocation  COMPARISON:  Multiple priors most recently CT 10/5/2016 VIEWS:  XR PELVIS AP ONLY 1 OR 2 VW FINDINGS: No acute displaced fracture or dislocation identified on the single provided view  No significant degenerative changes  No lytic or blastic osseous lesion  Soft tissues are unremarkable  Degenerative changes visualized lower lumbar spine  Impression: No acute osseous abnormality identified on the single provided view   Workstation performed: [id number]       EKG, Pathology, and Other Studies Reviewed on Admission:   · EKG:  AFib, without RVR, 92 HR    Epic / Care Everywhere Records Reviewed: Yes     ** Please Note: This note has been constructed using a voice recognition system   **

## 2022-02-03 NOTE — ASSESSMENT & PLAN NOTE
Home medications include Norvasc 5 mg daily, lisinopril-HCTZ 20/12 5 daily, Lopressor 25 b i d      Plan  · Continue home regimen  · Monitor BP closely

## 2022-02-03 NOTE — PROGRESS NOTES
[facility]  Progress Note - Aleksandr Brady 1935, 80 y o  female MRN: [id number]  Unit/Bed#: S -01 Encounter: [id number]  Primary Care Provider: Dinorah Dang MD   Date and time admitted to hospital: 2/2/2022  1:15 PM    * One Francisco Gutierrez, due to frequent falls  Nursing team at [facility] report that patient has had gradual deconditioning  Patient fell for the 1st time a few weeks ago, then 2/1 fell 3 times in 1 hour, and was brought to ED  Evaluation was completely normal and was sent back to [employer]  Patient returns 2/2/22 due to [facility] facility being unable to provide assistance for patient and requiring rehabilitation placement  CXR, CT of head without contrast, and x-ray of hip are all between normal limits  Plan  · PT eval - recommended post acute rehab  · OT eval awaiting  · MRI of lumbar spine  · Case management on board with rehab availability   · Neurochecks q 4 hours for 24 hours    Late onset Alzheimer's disease without behavioral disturbance (HCC)  Assessment & Plan  Home regimen includes Aricept 10 mg at bedtime    Plan  · Continue home regimen    Benign essential hypertension  Assessment & Plan  Home medications include Norvasc 5 mg daily, lisinopril-HCTZ 20/12 5 daily, Lopressor 25 b i d  Plan  · Continue home regimen  · Monitor BP closely    Atrial fibrillation (HCC)  Assessment & Plan  Takes warfarin 3 mg daily  INR therapeutic (goal 2-3)    Plan  · Continue home regimen  · Monitor INR      VTE Pharmacologic Prophylaxis: VTE Score: 5 High Risk (Score >/= 5) - Pharmacological DVT Prophylaxis Ordered: warfarin (Coumadin)  Sequential Compression Devices Ordered  Patient Centered Rounds: I performed bedside rounds with nursing staff today    Discussions with Specialists or Other Care Team Provider: PT/OT, Case management    Education and Discussions with Family / Patient: Updated  (niece) via phone     Current Length of Stay: 0 day(s)  Current Patient Status: Inpatient   Discharge Plan: pending rehab    Code Status: Level 3 - DNAR and DNI    Subjective:   Patient is doing well and just wants to go home  Patient does not know why she is in the hospital  Denies any pain, headache, shortness of breath, nausea/vomiting, fever/chills  Objective:     Vitals:   Temp (24hrs), Av 2 °F (36 8 °C), Min:97 9 °F (36 6 °C), Max:98 4 °F (36 9 °C)    Temp:  [97 9 °F (36 6 °C)-98 4 °F (36 9 °C)] 97 9 °F (36 6 °C)  HR:  [72-94] 75  Resp:  [16-18] 16  BP: ()/(61-79) 99/65  SpO2:  [95 %-97 %] 96 %  There is no height or weight on file to calculate BMI  Input and Output Summary (last 24 hours): Intake/Output Summary (Last 24 hours) at 2/3/2022 1516  Last data filed at 2022 2201  Gross per 24 hour   Intake 240 ml   Output --   Net 240 ml       Physical Exam:   Physical Exam  Nursing note reviewed  Constitutional:       General: She is not in acute distress  Appearance: She is well-developed  HENT:      Head: Normocephalic and atraumatic  Nose: Nose normal    Eyes:      Conjunctiva/sclera: Conjunctivae normal    Cardiovascular:      Rate and Rhythm: Normal rate and regular rhythm  Heart sounds: No murmur heard  Pulmonary:      Effort: Pulmonary effort is normal  No respiratory distress  Breath sounds: Normal breath sounds  Abdominal:      Palpations: Abdomen is soft  Tenderness: There is no abdominal tenderness  Musculoskeletal:      Cervical back: Neck supple  Right lower leg: No edema  Left lower leg: No edema  Skin:     General: Skin is warm and dry  Neurological:      Mental Status: She is alert and oriented to person, place, and time  Cranial Nerves: No cranial nerve deficit  Sensory: No sensory deficit  Motor: No weakness  Deep Tendon Reflexes: Reflexes normal       Comments:  On examination, upper extremities b/l 5/5   left leg strength 5/5, right leg 5/5  Cannot to heel to shin with her left heel  Right side is normal          Additional Data:     Labs:  Results from last 7 days   Lab Units 02/03/22  0643   WBC Thousand/uL 6 32   HEMOGLOBIN g/dL 13 6   HEMATOCRIT % 41 5   PLATELETS Thousands/uL 216   NEUTROS PCT % 49   LYMPHS PCT % 27   MONOS PCT % 13*   EOS PCT % 10*     Results from last 7 days   Lab Units 02/03/22  0643   SODIUM mmol/L 135*   POTASSIUM mmol/L 4 1   CHLORIDE mmol/L 104   CO2 mmol/L 25   BUN mg/dL 26*   CREATININE mg/dL 0 75   ANION GAP mmol/L 6   CALCIUM mg/dL 9 8   ALBUMIN g/dL 2 7*   TOTAL BILIRUBIN mg/dL 1 17*   ALK PHOS U/L 105   ALT U/L 48   AST U/L 60*   GLUCOSE RANDOM mg/dL 92     Results from last 7 days   Lab Units 02/03/22  0643   INR  2 11*                   Lines/Drains:  Invasive Devices  Report    Peripheral Intravenous Line            Peripheral IV 02/02/22 Right;Ventral (anterior) Wrist <1 day                Imaging: No pertinent imaging reviewed  Recent Cultures (last 7 days):         Last 24 Hours Medication List:   Current Facility-Administered Medications   Medication Dose Route Frequency Provider Last Rate    acetaminophen  975 mg Oral Q8H Enrique Moraes DO      atorvastatin  20 mg Oral Daily Hunter Bay MD      vitamin B-12  1,000 mcg Oral Daily Hunter Bay MD      Diclofenac Sodium  2 g Topical 4x Daily Heather Idler,       donepezil  10 mg Oral HS Hunter Bay MD      lisinopril-hydrochlorothiazide (PRINZIDE 20/12  5) combo dose   Oral BID Hunter Bay MD      metoprolol tartrate  25 mg Oral BID Hunter Bay MD      potassium chloride  20 mEq Oral Daily Hunter Bay MD      warfarin  3 mg Oral Daily (warfarin) Hunter Bay MD          Today, Patient Was Seen By: Ricky Alberto MD    **Please Note: This note may have been constructed using a voice recognition system  **

## 2022-02-03 NOTE — PLAN OF CARE
Problem: MOBILITY - ADULT  Goal: Maintain or return to baseline ADL function  Description: INTERVENTIONS:  -  Assess patient's ability to carry out ADLs; assess patient's baseline for ADL function and identify physical deficits which impact ability to perform ADLs (bathing, care of mouth/teeth, toileting, grooming, dressing, etc )  - Assess/evaluate cause of self-care deficits   - Assess range of motion  - Assess patient's mobility; develop plan if impaired  - Assess patient's need for assistive devices and provide as appropriate  - Encourage maximum independence but intervene and supervise when necessary  - Involve family in performance of ADLs  - Assess for home care needs following discharge   - Consider OT consult to assist with ADL evaluation and planning for discharge  - Provide patient education as appropriate  Outcome: Progressing  Goal: Maintains/Returns to pre admission functional level  Description: INTERVENTIONS:  - Perform BMAT or MOVE assessment daily    - Set and communicate daily mobility goal to care team and patient/family/caregiver  - Collaborate with rehabilitation services on mobility goals if consulted  - Perform Range of Motion 4 times a day  - Reposition patient every 2 hours    - Record patient progress and toleration of activity level   Outcome: Progressing

## 2022-02-03 NOTE — ASSESSMENT & PLAN NOTE
POA, due to frequent falls  Nursing team at [facility] report that patient has had gradual deconditioning  Patient fell for the 1st time a few weeks ago, then 2/1 fell 3 times in 1 hour, and was brought to ED  Evaluation was completely normal and was sent back to [employer] [facility]  Patient returns 2/2/22 due to [facility] facility being unable to provide assistance for patient and requiring rehabilitation placement  CXR, CT of head without contrast, and x-ray of hip are all between normal limits      Plan  · PT eval - recommended post acute rehab  · OT eval awaiting  · MRI of lumbar spine  · Case management on board with rehab availability   · Neurochecks q 4 hours for 24 hours

## 2022-02-04 ENCOUNTER — APPOINTMENT (INPATIENT)
Dept: CT IMAGING | Facility: HOSPITAL | Age: 87
DRG: 092 | End: 2022-02-04
Payer: MEDICARE

## 2022-02-04 LAB
ANION GAP SERPL CALCULATED.3IONS-SCNC: 8 MMOL/L (ref 4–13)
BUN SERPL-MCNC: 22 MG/DL (ref 5–25)
CALCIUM SERPL-MCNC: 9.7 MG/DL (ref 8.3–10.1)
CHLORIDE SERPL-SCNC: 108 MMOL/L (ref 100–108)
CO2 SERPL-SCNC: 24 MMOL/L (ref 21–32)
CREAT SERPL-MCNC: 0.74 MG/DL (ref 0.6–1.3)
ERYTHROCYTE [DISTWIDTH] IN BLOOD BY AUTOMATED COUNT: 15.2 % (ref 11.6–15.1)
FLUAV RNA RESP QL NAA+PROBE: NEGATIVE
FLUBV RNA RESP QL NAA+PROBE: NEGATIVE
GFR SERPL CREATININE-BSD FRML MDRD: 73 ML/MIN/1.73SQ M
GLUCOSE SERPL-MCNC: 80 MG/DL (ref 65–140)
HCT VFR BLD AUTO: 42 % (ref 34.8–46.1)
HGB BLD-MCNC: 13.7 G/DL (ref 11.5–15.4)
INR PPP: 2.41 (ref 0.84–1.19)
MCH RBC QN AUTO: 29.7 PG (ref 26.8–34.3)
MCHC RBC AUTO-ENTMCNC: 32.6 G/DL (ref 31.4–37.4)
MCV RBC AUTO: 91 FL (ref 82–98)
PLATELET # BLD AUTO: 226 THOUSANDS/UL (ref 149–390)
PMV BLD AUTO: 11.7 FL (ref 8.9–12.7)
POTASSIUM SERPL-SCNC: 3.9 MMOL/L (ref 3.5–5.3)
PROTHROMBIN TIME: 25.8 SECONDS (ref 11.6–14.5)
RBC # BLD AUTO: 4.61 MILLION/UL (ref 3.81–5.12)
RSV RNA RESP QL NAA+PROBE: NEGATIVE
SARS-COV-2 RNA RESP QL NAA+PROBE: NEGATIVE
SODIUM SERPL-SCNC: 140 MMOL/L (ref 136–145)
WBC # BLD AUTO: 6.61 THOUSAND/UL (ref 4.31–10.16)

## 2022-02-04 PROCEDURE — G1004 CDSM NDSC: HCPCS

## 2022-02-04 PROCEDURE — 73700 CT LOWER EXTREMITY W/O DYE: CPT

## 2022-02-04 PROCEDURE — 85610 PROTHROMBIN TIME: CPT | Performed by: INTERNAL MEDICINE

## 2022-02-04 PROCEDURE — 99232 SBSQ HOSP IP/OBS MODERATE 35: CPT | Performed by: INTERNAL MEDICINE

## 2022-02-04 PROCEDURE — 0241U HB NFCT DS VIR RESP RNA 4 TRGT: CPT

## 2022-02-04 PROCEDURE — 80048 BASIC METABOLIC PNL TOTAL CA: CPT

## 2022-02-04 PROCEDURE — 97167 OT EVAL HIGH COMPLEX 60 MIN: CPT

## 2022-02-04 PROCEDURE — 85027 COMPLETE CBC AUTOMATED: CPT

## 2022-02-04 RX ADMIN — ATORVASTATIN CALCIUM 20 MG: 20 TABLET, FILM COATED ORAL at 08:39

## 2022-02-04 RX ADMIN — HYDROCHLOROTHIAZIDE: 12.5 TABLET ORAL at 22:28

## 2022-02-04 RX ADMIN — DONEPEZIL HYDROCHLORIDE 10 MG: 5 TABLET, FILM COATED ORAL at 22:28

## 2022-02-04 RX ADMIN — DICLOFENAC SODIUM 2 G: 10 GEL TOPICAL at 12:12

## 2022-02-04 RX ADMIN — METOPROLOL TARTRATE 25 MG: 25 TABLET, FILM COATED ORAL at 17:31

## 2022-02-04 RX ADMIN — POTASSIUM CHLORIDE 20 MEQ: 1500 TABLET, EXTENDED RELEASE ORAL at 08:39

## 2022-02-04 RX ADMIN — ACETAMINOPHEN 975 MG: 325 TABLET, FILM COATED ORAL at 04:54

## 2022-02-04 RX ADMIN — WARFARIN SODIUM 3 MG: 3 TABLET ORAL at 17:31

## 2022-02-04 RX ADMIN — DICLOFENAC SODIUM 2 G: 10 GEL TOPICAL at 17:31

## 2022-02-04 RX ADMIN — METOPROLOL TARTRATE 25 MG: 25 TABLET, FILM COATED ORAL at 08:39

## 2022-02-04 RX ADMIN — DICLOFENAC SODIUM 2 G: 10 GEL TOPICAL at 08:37

## 2022-02-04 RX ADMIN — CYANOCOBALAMIN TAB 500 MCG 1000 MCG: 500 TAB at 08:39

## 2022-02-04 RX ADMIN — DICLOFENAC SODIUM 2 G: 10 GEL TOPICAL at 22:27

## 2022-02-04 RX ADMIN — HYDROCHLOROTHIAZIDE: 12.5 TABLET ORAL at 08:39

## 2022-02-04 RX ADMIN — ACETAMINOPHEN 975 MG: 325 TABLET, FILM COATED ORAL at 12:12

## 2022-02-04 NOTE — CASE MANAGEMENT
Case Management Discharge Planning Note    Patient name Delano Lopez  Location S /S -85 MRN [id number]  : 1935 Date 2022       Current Admission Date: 2022  Current Admission Diagnosis:Fall   Patient Active Problem List    Diagnosis Date Noted   Kevan Masters Fall 2022    Hyperglycemia 2021    Essential hypertension 2021    Persistent atrial fibrillation ([facility] ) 2021    Mixed hyperlipidemia 2021    Late onset [facility] 2021    Hematoma 2021    Memory deficit 2018    Aortic stenosis, mild 2017    Sick sinus syndrome [facility] 2017    Endometrial hyperplasia 2016    Hyperlipidemia, mixed 2015    Atrial fibrillation ([facility] ) 10/01/2014    Benign essential hypertension 2013    Kidney carcinoma ([facility] ) 2013      LOS (days): 1  Geometric Mean LOS (GMLOS) (days): 2 60  Days to GMLOS:1 5     OBJECTIVE:  Risk of Unplanned Readmission Score: 14         Current admission status: Inpatient   Preferred Pharmacy:   [address] [place], [place] [id number]  Phone: [phone] Fax: [phone]    Primary Care Provider: Kyler Le MD    Primary Insurance: MEDICARE  Secondary Insurance: [facility]    DISCHARGE DETAILS:     CM received a call from pt's niece who is pt's POA  She stated she has been visiting facilities and prefers to have pt go to [address] [place] [address] as first choice and [facility] as second choice  CM has messaged [facility] as they are able to accept pt and offer a bed  Pt will be ready for DC tomorrow and this has been told to them as well  [place] is also aware  CM will be setting up transportation at OR

## 2022-02-04 NOTE — OCCUPATIONAL THERAPY NOTE
Occupational Therapy Evaluation      [facility] [place]    2/4/2022    Patient Active Problem List   Diagnosis    Aortic stenosis, mild    Atrial fibrillation ([address] )    Benign essential hypertension    Endometrial hyperplasia    Hyperlipidemia, mixed    Kidney carcinoma ([address] )    Sick sinus syndrome ([address] )    Memory deficit    Hematoma    Essential hypertension    Persistent atrial fibrillation (HCC)    Mixed hyperlipidemia    Late onset Alzheimer's disease without behavioral disturbance ([address] )    Hyperglycemia    Fall       Past Medical History:   Diagnosis Date    Essential hypertension 4/11/2021    Late onset Alzheimer's disease without behavioral disturbance ([address] ) 4/11/2021    Mixed hyperlipidemia 4/11/2021    Persistent atrial fibrillation ([address] ) 4/11/2021       Past Surgical History:   Procedure Laterality Date    APPENDECTOMY      CATARACT EXTRACTION, BILATERAL Bilateral     EYE SURGERY      Cataract    HIATAL HERNIA REPAIR      Anterior Gastropexy    RENAL MASS EXCISION Right     Cryosurgical Ablation    TONSILECTOMY AND ADNOIDECTOMY      TONSILLECTOMY          02/04/22 1123   OT Last Visit   OT Visit Date 02/04/22   Note Type   Note type Evaluation   Restrictions/Precautions   Weight Bearing Precautions Per Order No   Other Precautions Cognitive; Bed Alarm; Fall Risk   Pain Assessment   Pain Assessment Tool 0-10   Pain Score No Pain   Home Living   Type of Home Assisted living  [facility])   Home Layout One level   Home Equipment Walker   Prior Function   Level of [place] Independent with ADLs and functional mobility   Lives With Facility staff   Receives Help From Personal care attendant   ADL Assistance Independent  (per pt)   IADLs Needs assistance   Falls in the last 6 months 1 to 4  (pt reports "oh a bunch")   Lifestyle   Autonomy Pt from [address] [place] [address] reporting I with ADL's and using a walker for functional mobility   Reciprocal Relationships facility staff   Intrinsic Gratification "I joined a bunch of groups at [employer]   Subjective "I don't mean to be rude, I just don't want you to be here"   ADL   Eating Assistance 5  Supervision/Setup   Eating Deficit Setup; Beverage management   Grooming Assistance 5  Supervision/Setup   Grooming Deficit Setup;Verbal cueing;Supervision/safety; Increased time to complete;Brushing hair  (sitting EOB)   UB Dressing Assistance 4  Minimal Assistance   UB Dressing Deficit Setup;Steadying;Verbal cueing;Supervision/safety; Increased time to complete; Thread RUE; Thread LUE;Pull around back  (+ verbal cueing for sequencing )   LB Dressing Assistance Unable to assess  (pt declining new socks )   Toileting Assistance  Unable to assess  (Donald Monique in place; declined need for BM)   Bed Mobility   Supine to Sit 3  Moderate assistance   Additional items Assist x 1; Increased time required;Verbal cues;LE management;HOB elevated; Bedrails   Sit to Supine 4  Minimal assistance   Additional items Assist x 1; Increased time required;Verbal cues;LE management   Additional Comments Pt required Max a x 1 to reposition towards [facility]   Transfers   Sit to Stand 3  Moderate assistance   Additional items Assist x 1; Increased time required;Verbal cues   Stand to Sit 3  Moderate assistance   Additional items Assist x 1; Increased time required;Verbal cues   Functional Mobility   Functional Mobility 3  Moderate assistance   Additional Comments Pt took few steps EOB with RW and + time to advance LE's  Pt declining sitting OOB in recliner reporting "I don't like that chair"  Pt unsteady on feet and needing to sit after 15 seconds due to pain in knees  Pt to stand seconds time and take few steps to [facility]  Additional items Rolling walker   Balance   Static Sitting Fair -   Dynamic Sitting Poor +   Static Standing Poor +   Dynamic Standing Poor   Activity Tolerance   Activity Tolerance Patient limited by fatigue; Other (Comment); Patient limited by pain  (limited by cognition) Nurse Made Aware yes, RN ok to see pt and updated on outcomes    RUE Assessment   RUE Assessment WFL   LUE Assessment   LUE Assessment WFL   Hand Function   Gross Motor Coordination Functional   Fine Motor Coordination Functional   Sensation   Light Touch No apparent deficits   Cognition   Overall Cognitive Status Impaired   Arousal/Participation Alert; Cooperative   Attention Attends with cues to redirect   Orientation Level Oriented to person;Disoriented to time;Disoriented to place; Disoriented to situation  (generally oriented to situation but reporting at [facility])   Memory Decreased recall of precautions;Decreased recall of recent events;Decreased short term memory   Following Commands Follows one step commands with increased time or repetition   Comments Pt agreeable to OT session with + encouragement to participate  Pt initially reporting "I don't want you here, please leave" "I'm not trying to be rude but please leave"  Pt agreeable to participation after encouragement  Pt reporting currently at [facility]  Pt required + verbal cueing to sequence tasks this session and with limited insight into deficits and poor safety awareness  Assessment   Limitation Decreased ADL status; Decreased UE strength;Decreased cognition;Decreased Safe judgement during ADL;Decreased endurance;Decreased self-care trans;Decreased high-level ADLs   Prognosis Good   Assessment Patient is a 80 y o  female seen for OT evaluation s/p admit to [address] [place] [address] on 2/2/2022 w/Fall  Comorbidities affecting patient's functional performance at time of assessment include: Afib, dementia and hypertension  Orders received for OT evaluation and treatment  Patient identified during session through name and wristband  PTA, patient was independent with functional mobility with RW, independent with ADLS, requiring assist for IADLS and an assisted living resident   Personal factors affecting patient at time of initial evaluation include: limited insight into deficits, flat affect, decreased initiation and engagement, difficulty performing ADLs and difficulty performing IADLs  Patient is alert, oriented to name,, disoriented to time,, disoriented to place, and disoriented to situation, and presents with impaired judgement, inability to make safe decisions  The evaluation identifies the following performance deficits: weakness, impaired balance, decreased endurance, decreased coordination, increased fall risk, new onset of impairment of functional mobility, decreased ADLS, decreased IADLS, decreased activity tolerance, decreased safety awareness, impaired judgement, decreased cognition and decreased strength, that result in activity limitations  Based on the OT evaluation outcomes, functional performance deficits, and assessment findings, pt has been identified as high complexity, because the patient presents with comorbidites that affect occupational performance and required significant modification of tasks or assistance with consideration of multiple treatment options  The patient's raw score on the AM-PAC Daily Activity inpatient short form is 15, standardized score is 34 69, less than 39 4  Patient to benefit from continued Occupational Therapy treatment to address above deficits and maximize level of independence with ADLs and functional mobility  Occupational Performance areas to address include: grooming , bathing/ shower, dressing, toilet hygiene, transfer to all surfaces and functional ambulation  From OT standpoint, recommendation at time of d/c would be Post acute rehabilitation services versus return to [facility] pending level of support facility can provide  Goals   Patient Goals to be left alone   LTG Time Frame 10-14   Long Term Goal #1 see goals below    Plan   Treatment Interventions ADL retraining;Functional transfer training;UE strengthening/ROM; Endurance training;Cognitive reorientation;Patient/family training;Equipment evaluation/education; Compensatory technique education;Continued evaluation; Energy conservation; Activityengagement   Goal Expiration Date 02/14/22   OT Treatment Day 0   OT Frequency 3-5x/wk   Recommendation   OT Discharge Recommendation Post acute rehabilitation services  (vs return to nursing home pending level of support able to provide)   OT - OK to Discharge Yes  (Once medically cleared)   Additional Comments  At end of session, pt retured to supine in bed upon request and setup with lunch tray despite pt reporting she doesn't want to eat  Pt only interested in soda on tray  AM-PAC Daily Activity Inpatient   Lower Body Dressing 2   Bathing 2   Toileting 2   Upper Body Dressing 3   Grooming 3   Eating 3   Daily Activity Raw Score 15   Daily Activity Standardized Score (Calc for Raw Score >=11) 34 69   AM-PAC Applied Cognition Inpatient   Following a Speech/Presentation 2   Understanding Ordinary Conversation 3   Taking Medications 1   Remembering Where Things Are Placed or Put Away 2   Remembering List of 4-5 Errands 2   Taking Care of Complicated Tasks 2   Applied Cognition Raw Score 12   Applied Cognition Standardized Score 28 82     GOALS:    *Goals established to promote patient goal of getting left alone:      *ADL transfers with Min (A) for inc'd independence with ADLs/purposeful tasks    *UB ADL with (S) for inc'd independence with self cares    *LB ADL with Min (A) using AE prn for inc'd independence with self cares    *Increase stand tolerance x2 m for inc'd tolerance with standing purposeful tasks    *Participate in 10m UE therex to increase overall stamina/activity tolerance for purposeful tasks    *Bed mobility- Min (A) for inc'd independence to manage own comfort and initiate EOB & OOB purposeful tasks    *Patient will verbalize 3 safety awareness/ principles to prevent falls in the home setting       *Patient will increase OOB/sitting tolerance to 2-4 hours per day to increase participation in self-care and leisure tasks with no s/s of exertion  *Patient will improve functional activity tolerance to 10 minutes of sustained functional tasks to increase participation in basic self-care and decrease assistance level       Ammy Acosta, OTR/L

## 2022-02-04 NOTE — PROGRESS NOTES
[facility]  Progress Note - Darrel Fraser 1935, 80 y o  female MRN: [id number]  Unit/Bed#: S -01 Encounter: [id number]  Primary Care Provider: Yong Elliott MD   Date and time admitted to hospital: 2/2/2022  1:15 PM    * One Francisco Gutierrez, due to frequent falls  Nursing team at [facility] report that patient has had gradual deconditioning  Patient fell for the 1st time a few weeks ago, then 2/1 fell 3 times in 1 hour, and was brought to ED  Evaluation was completely normal and was sent back to [facility]  Patient returns 2/2/22 due to [facility] facility being unable to provide assistance for patient and requiring rehabilitation placement  CXR, CT of head without contrast, and x-ray of hip are all between normal limits  Plan  · PT eval - recommended post acute rehab  · OT eval awaiting  · MRI of lumbar spine discontinued as there is not any reason for it as she can raise her left leg against gravity and resist  · Case management on board with rehab availability     Late onset Alzheimer's disease without behavioral disturbance (HCC)  Assessment & Plan  Home regimen includes Aricept 10 mg at bedtime    Plan  · Continue home regimen    Benign essential hypertension  Assessment & Plan  Home medications include Norvasc 5 mg daily, lisinopril-HCTZ 20/12 5 daily, Lopressor 25 b i d  Plan  · Continue home regimen  · Monitor BP closely    Atrial fibrillation (HCC)  Assessment & Plan  Takes warfarin 3 mg daily  INR therapeutic (goal 2-3)    Plan  · Continue home regimen  · Monitor INR  · Will likely need to discontinue warfarin moving forward with the fall risk  VTE Pharmacologic Prophylaxis: VTE Score: 5 High Risk (Score >/= 5) - Pharmacological DVT Prophylaxis Ordered: warfarin (Coumadin)  Sequential Compression Devices Ordered  Patient Centered Rounds: I performed bedside rounds with nursing staff today    Discussions with Specialists or Other Care Team Provider: None    Education and Discussions with Family / Patient: Updated  (niece) via phone  Current Length of Stay: 1 day(s)  Current Patient Status: Inpatient   Discharge Plan: Anticipate discharge in 48-72 hrs to rehab facility  Code Status: Level 3 - DNAR and DNI    Subjective:   Patient is doing well this morning  No new complaint  Objective:     Vitals:   Temp (24hrs), Av 1 °F (36 7 °C), Min:97 8 °F (36 6 °C), Max:98 5 °F (36 9 °C)    Temp:  [97 8 °F (36 6 °C)-98 5 °F (36 9 °C)] 98 5 °F (36 9 °C)  HR:  [83-96] 96  Resp:  [16-18] 16  BP: (114-132)/(63-67) 116/67  SpO2:  [95 %-97 %] 97 %  There is no height or weight on file to calculate BMI  Input and Output Summary (last 24 hours): Intake/Output Summary (Last 24 hours) at 2022 1156  Last data filed at 2022 0513  Gross per 24 hour   Intake --   Output 700 ml   Net -700 ml       Physical Exam:   Physical Exam  Vitals and nursing note reviewed  Constitutional:       General: She is not in acute distress  Appearance: She is well-developed  HENT:      Head: Normocephalic and atraumatic  Eyes:      Conjunctiva/sclera: Conjunctivae normal    Cardiovascular:      Rate and Rhythm: Normal rate and regular rhythm  Heart sounds: No murmur heard  Pulmonary:      Effort: Pulmonary effort is normal  No respiratory distress  Breath sounds: Normal breath sounds  Abdominal:      Palpations: Abdomen is soft  Tenderness: There is no abdominal tenderness  Musculoskeletal:      Cervical back: Neck supple  Right lower leg: No edema  Left lower leg: No edema  Skin:     General: Skin is warm and dry  Neurological:      Mental Status: She is alert  Sensory: No sensory deficit  Motor: No weakness            Additional Data:     Labs:  Results from last 7 days   Lab Units 22  0453 22  0643 22  0643   WBC Thousand/uL 6 61   < > 6 32 HEMOGLOBIN g/dL 13 7   < > 13 6   HEMATOCRIT % 42 0   < > 41 5   PLATELETS Thousands/uL 226   < > 216   NEUTROS PCT %  --   --  49   LYMPHS PCT %  --   --  27   MONOS PCT %  --   --  13*   EOS PCT %  --   --  10*    < > = values in this interval not displayed  Results from last 7 days   Lab Units 02/04/22  0453 02/03/22  0643 02/03/22  0643   SODIUM mmol/L 140   < > 135*   POTASSIUM mmol/L 3 9   < > 4 1   CHLORIDE mmol/L 108   < > 104   CO2 mmol/L 24   < > 25   BUN mg/dL 22   < > 26*   CREATININE mg/dL 0 74   < > 0 75   ANION GAP mmol/L 8   < > 6   CALCIUM mg/dL 9 7   < > 9 8   ALBUMIN g/dL  --   --  2 7*   TOTAL BILIRUBIN mg/dL  --   --  1 17*   ALK PHOS U/L  --   --  105   ALT U/L  --   --  48   AST U/L  --   --  60*   GLUCOSE RANDOM mg/dL 80   < > 92    < > = values in this interval not displayed  Results from last 7 days   Lab Units 02/04/22  0453   INR  2 41*                   Lines/Drains:  Invasive Devices  Report    Peripheral Intravenous Line            Peripheral IV 02/02/22 Right;Ventral (anterior) Wrist 1 day                Imaging: No pertinent imaging reviewed  Recent Cultures (last 7 days):         Last 24 Hours Medication List:   Current Facility-Administered Medications   Medication Dose Route Frequency Provider Last Rate    acetaminophen  975 mg Oral Q8H Alexandra Gibbs DO      atorvastatin  20 mg Oral Daily Michael Alonso MD      vitamin B-12  1,000 mcg Oral Daily Michael Alonso MD      Diclofenac Sodium  2 g Topical 4x Daily Vickey Call DO      donepezil  10 mg Oral HS Michael Alonso MD      lisinopril-hydrochlorothiazide (PRINZIDE 20/12  5) combo dose   Oral BID Michael Alonso MD      metoprolol tartrate  25 mg Oral BID Michael Alonso MD      potassium chloride  20 mEq Oral Daily Michael Alonso MD      warfarin  3 mg Oral Daily (warfarin) Michael Alonso MD          Today, Patient Was Seen By: Arthur Moreno Bronwyn Garcia MD    **Please Note: This note may have been constructed using a voice recognition system  **

## 2022-02-04 NOTE — PLAN OF CARE
Problem: OCCUPATIONAL THERAPY ADULT  Goal: Performs self-care activities at highest level of function for planned discharge setting  See evaluation for individualized goals  Description: Treatment Interventions: ADL retraining,Functional transfer training,UE strengthening/ROM,Endurance training,Cognitive reorientation,Patient/family training,Equipment evaluation/education,Compensatory technique education,Continued evaluation,Energy conservation,Activityengagement          See flowsheet documentation for full assessment, interventions and recommendations  Note: Limitation: Decreased ADL status,Decreased UE strength,Decreased cognition,Decreased Safe judgement during ADL,Decreased endurance,Decreased self-care trans,Decreased high-level ADLs  Prognosis: Good  Assessment: Patient is a 80 y o  female seen for OT evaluation s/p admit to [facility] on 2/2/2022 w/Fall  Comorbidities affecting patient's functional performance at time of assessment include: Afib, dementia and hypertension  Orders received for OT evaluation and treatment  Patient identified during session through name and wristband  PTA, patient was independent with functional mobility with RW, independent with ADLS, requiring assist for IADLS and an assisted living resident  Personal factors affecting patient at time of initial evaluation include: limited insight into deficits, flat affect, decreased initiation and engagement, difficulty performing ADLs and difficulty performing IADLs  Patient is alert, oriented to name,, disoriented to time,, disoriented to place, and disoriented to situation, and presents with impaired judgement, inability to make safe decisions   The evaluation identifies the following performance deficits: weakness, impaired balance, decreased endurance, decreased coordination, increased fall risk, new onset of impairment of functional mobility, decreased ADLS, decreased IADLS, decreased activity tolerance, decreased safety awareness, impaired judgement, decreased cognition and decreased strength, that result in activity limitations  Based on the OT evaluation outcomes, functional performance deficits, and assessment findings, pt has been identified as high complexity, because the patient presents with comorbidites that affect occupational performance and required significant modification of tasks or assistance with consideration of multiple treatment options  The patient's raw score on the AM-PAC Daily Activity inpatient short form is 15, standardized score is 34 69, less than 39 4  Patient to benefit from continued Occupational Therapy treatment to address above deficits and maximize level of independence with ADLs and functional mobility  Occupational Performance areas to address include: grooming , bathing/ shower, dressing, toilet hygiene, transfer to all surfaces and functional ambulation  From OT standpoint, recommendation at time of d/c would be Post acute rehabilitation services versus return to [facility] pending level of support facility can provide         OT Discharge Recommendation: Post acute rehabilitation services (vs return to [facility] pending level of support able to provide)  OT - OK to Discharge: Yes (Once medically cleared)     Francisca Foley, OT

## 2022-02-04 NOTE — PLAN OF CARE
Problem: MOBILITY - ADULT  Goal: Maintain or return to baseline ADL function  Description: INTERVENTIONS:  -  Assess patient's ability to carry out ADLs; assess patient's baseline for ADL function and identify physical deficits which impact ability to perform ADLs (bathing, care of mouth/teeth, toileting, grooming, dressing, etc )  - Assess/evaluate cause of self-care deficits   - Assess range of motion  - Assess patient's mobility; develop plan if impaired  - Assess patient's need for assistive devices and provide as appropriate  - Encourage maximum independence but intervene and supervise when necessary  - Involve family in performance of ADLs  - Assess for home care needs following discharge   - Consider OT consult to assist with ADL evaluation and planning for discharge  - Provide patient education as appropriate  Outcome: Progressing  Goal: Maintains/Returns to pre admission functional level  Description: INTERVENTIONS:  - Perform BMAT or MOVE assessment daily    - Set and communicate daily mobility goal to care team and patient/family/caregiver  - Collaborate with rehabilitation services on mobility goals if consulted  - Perform Range of Motion times a day  - Reposition patient every  hours    - Dangle patient  times a day  - Stand patient  times a day  - Ambulate patient  times a day  - Out of bed to chair  times a day   - Out of bed for meals  times a day  - Out of bed for toileting  - Record patient progress and toleration of activity level   Outcome: Progressing     Problem: Prexisting or High Potential for Compromised Skin Integrity  Goal: Skin integrity is maintained or improved  Description: INTERVENTIONS:  - Identify patients at risk for skin breakdown  - Assess and monitor skin integrity  - Assess and monitor nutrition and hydration status  - Monitor labs   - Assess for incontinence   - Turn and reposition patient  - Assist with mobility/ambulation  - Relieve pressure over bony prominences  - Avoid friction and shearing  - Provide appropriate hygiene as needed including keeping skin clean and dry  - Evaluate need for skin moisturizer/barrier cream  - Collaborate with interdisciplinary team   - Patient/family teaching  - Consider wound care consult   Outcome: Progressing     Problem: PAIN - ADULT  Goal: Verbalizes/displays adequate comfort level or baseline comfort level  Description: Interventions:  - Encourage patient to monitor pain and request assistance  - Assess pain using appropriate pain scale  - Administer analgesics based on type and severity of pain and evaluate response  - Implement non-pharmacological measures as appropriate and evaluate response  - Consider cultural and social influences on pain and pain management  - Notify physician/advanced practitioner if interventions unsuccessful or patient reports new pain  Outcome: Progressing     Problem: INFECTION - ADULT  Goal: Absence or prevention of progression during hospitalization  Description: INTERVENTIONS:  - Assess and monitor for signs and symptoms of infection  - Monitor lab/diagnostic results  - Monitor all insertion sites, i e  indwelling lines, tubes, and drains  - Monitor endotracheal if appropriate and nasal secretions for changes in amount and color  - [place] appropriate cooling/warming therapies per order  - Administer medications as ordered  - Instruct and encourage patient and family to use good hand hygiene technique  - Identify and instruct in appropriate isolation precautions for identified infection/condition  Outcome: Progressing  Goal: Absence of fever/infection during neutropenic period  Description: INTERVENTIONS:  - Monitor WBC    Outcome: Progressing     Problem: SAFETY ADULT  Goal: Maintain or return to baseline ADL function  Description: INTERVENTIONS:  -  Assess patient's ability to carry out ADLs; assess patient's baseline for ADL function and identify physical deficits which impact ability to perform ADLs (bathing, care of mouth/teeth, toileting, grooming, dressing, etc )  - Assess/evaluate cause of self-care deficits   - Assess range of motion  - Assess patient's mobility; develop plan if impaired  - Assess patient's need for assistive devices and provide as appropriate  - Encourage maximum independence but intervene and supervise when necessary  - Involve family in performance of ADLs  - Assess for home care needs following discharge   - Consider OT consult to assist with ADL evaluation and planning for discharge  - Provide patient education as appropriate  Outcome: Progressing  Goal: Maintains/Returns to pre admission functional level  Description: INTERVENTIONS:  - Perform BMAT or MOVE assessment daily    - Set and communicate daily mobility goal to care team and patient/family/caregiver  - Collaborate with rehabilitation services on mobility goals if consulted  - Perform Range of Motion  times a day  - Reposition patient every  hours    - Dangle patient  times a day  - Stand patient  times a day  - Ambulate patient  times a day  - Out of bed to chair  times a day   - Out of bed for meals  times a day  - Out of bed for toileting  - Record patient progress and toleration of activity level   Outcome: Progressing  Goal: Patient will remain free of falls  Description: INTERVENTIONS:  - Educate patient/family on patient safety including physical limitations  - Instruct patient to call for assistance with activity   - Consult OT/PT to assist with strengthening/mobility   - Keep Call bell within reach  - Keep bed low and locked with side rails adjusted as appropriate  - Keep care items and personal belongings within reach  - Initiate and maintain comfort rounds  - Make Fall Risk Sign visible to staff  - Offer Toileting every  Hours, in advance of need  - Initiate/Maintain alarm  - Obtain necessary fall risk management equipment  - Apply yellow socks and bracelet for high fall risk patients  - Consider moving patient to room near nurses station  Outcome: Progressing     Problem: DISCHARGE PLANNING  Goal: Discharge to home or other facility with appropriate resources  Description: INTERVENTIONS:  - Identify barriers to discharge w/patient and caregiver  - Arrange for needed discharge resources and transportation as appropriate  - Identify discharge learning needs (meds, wound care, etc )  - Arrange for interpretive services to assist at discharge as needed  - Refer to Case Management Department for coordinating discharge planning if the patient needs post-hospital services based on physician/advanced practitioner order or complex needs related to functional status, cognitive ability, or social support system  Outcome: Progressing     Problem: Knowledge Deficit  Goal: Patient/family/caregiver demonstrates understanding of disease process, treatment plan, medications, and discharge instructions  Description: Complete learning assessment and assess knowledge base    Interventions:  - Provide teaching at level of understanding  - Provide teaching via preferred learning methods  Outcome: Progressing

## 2022-02-04 NOTE — ASSESSMENT & PLAN NOTE
POA, due to frequent falls  Nursing team at [facility] report that patient has had gradual deconditioning  Patient fell for the 1st time a few weeks ago, then 2/1 fell 3 times in 1 hour, and was brought to ED  Evaluation was completely normal and was sent back to [facility]  Patient returns 2/2/22 due to [facility] facility being unable to provide assistance for patient and requiring rehabilitation placement  CXR, CT of head without contrast, and x-ray of hip are all between normal limits      Plan  · PT eval - recommended post acute rehab  · OT eval awaiting  · MRI of lumbar spine discontinued as there is not any reason for it as she can raise her left leg against gravity and resist  · Case management on board with rehab availability

## 2022-02-04 NOTE — PROGRESS NOTES
Pt resting comfortably in bed with no complaints  No change in prior RN assessment  VSS  Will continue to closely monitor

## 2022-02-04 NOTE — ASSESSMENT & PLAN NOTE
Takes warfarin 3 mg daily  INR therapeutic (goal 2-3)    Plan  · Continue home regimen  · Monitor INR  · Will likely need to discontinue warfarin moving forward with the fall risk

## 2022-02-05 LAB — MRSA NOSE QL CULT: NORMAL

## 2022-02-05 PROCEDURE — 99232 SBSQ HOSP IP/OBS MODERATE 35: CPT | Performed by: INTERNAL MEDICINE

## 2022-02-05 RX ADMIN — METOPROLOL TARTRATE 25 MG: 25 TABLET, FILM COATED ORAL at 18:20

## 2022-02-05 RX ADMIN — ACETAMINOPHEN 975 MG: 325 TABLET, FILM COATED ORAL at 14:48

## 2022-02-05 RX ADMIN — ACETAMINOPHEN 975 MG: 325 TABLET, FILM COATED ORAL at 06:07

## 2022-02-05 RX ADMIN — POTASSIUM CHLORIDE 20 MEQ: 1500 TABLET, EXTENDED RELEASE ORAL at 08:41

## 2022-02-05 RX ADMIN — WARFARIN SODIUM 3 MG: 3 TABLET ORAL at 18:20

## 2022-02-05 RX ADMIN — DICLOFENAC SODIUM 2 G: 10 GEL TOPICAL at 18:19

## 2022-02-05 RX ADMIN — DICLOFENAC SODIUM 2 G: 10 GEL TOPICAL at 12:26

## 2022-02-05 RX ADMIN — CYANOCOBALAMIN TAB 500 MCG 1000 MCG: 500 TAB at 08:41

## 2022-02-05 RX ADMIN — DONEPEZIL HYDROCHLORIDE 10 MG: 5 TABLET, FILM COATED ORAL at 21:40

## 2022-02-05 RX ADMIN — ACETAMINOPHEN 975 MG: 325 TABLET, FILM COATED ORAL at 21:39

## 2022-02-05 RX ADMIN — ATORVASTATIN CALCIUM 20 MG: 20 TABLET, FILM COATED ORAL at 08:41

## 2022-02-05 RX ADMIN — HYDROCHLOROTHIAZIDE: 12.5 TABLET ORAL at 08:41

## 2022-02-05 RX ADMIN — METOPROLOL TARTRATE 25 MG: 25 TABLET, FILM COATED ORAL at 08:41

## 2022-02-05 NOTE — ASSESSMENT & PLAN NOTE
Takes warfarin 3 mg daily  INR therapeutic (goal 2-3)    Plan  · Continue home regimen  · Monitor INR  · Will possibly need to discontinue warfarin moving forward with the fall risk   This will need to be a further discussion with family and PCP

## 2022-02-05 NOTE — PROGRESS NOTES
[facility]  Progress Note - Adelfomargie Po 1935, 80 y o  female MRN: [id number]  Unit/Bed#: S -01 Encounter: [id number]  Primary Care Provider: Dahlia Marshall MD   Date and time admitted to hospital: 2/2/2022  1:15 PM    * One Francisco Gutierrez, due to frequent falls  Nursing team at [facility] report that patient has had gradual deconditioning  Patient fell for the 1st time a few weeks ago, then 2/1 fell 3 times in 1 hour, and was brought to ED  Evaluation was completely normal and was sent back to [facility]  Patient returns 2/2/22 due to [facility] facility being unable to provide assistance for patient and requiring rehabilitation placement  CXR, CT of head without contrast, and x-ray of hip are all between normal limits  Plan  · PT eval - recommended post acute rehab  · MRI of lumbar spine discontinued as there is not any reason for it as she can raise her left leg against gravity and resist  · Case management on board with rehab availability   · CT leg/hip negative for acute fracture  Some mild OA    Late onset Alzheimer's disease without behavioral disturbance (HCC)  Assessment & Plan  Home regimen includes Aricept 10 mg at bedtime    Plan  · Continue home regimen    Benign essential hypertension  Assessment & Plan  Home medications include Norvasc 5 mg daily, lisinopril-HCTZ 20/12 5 daily, Lopressor 25 b i d  Plan  · Continue home regimen  · Monitor BP closely    Atrial fibrillation (HCC)  Assessment & Plan  Takes warfarin 3 mg daily  INR therapeutic (goal 2-3)    Plan  · Continue home regimen  · Monitor INR  · Will possibly need to discontinue warfarin moving forward with the fall risk  This will need to be a further discussion with family and PCP      VTE Pharmacologic Prophylaxis: VTE Score: 5 High Risk (Score >/= 5) - Pharmacological DVT Prophylaxis Ordered: warfarin (Coumadin)   Sequential Compression Devices Ordered  Patient Centered Rounds: I performed bedside rounds with nursing staff today  Discussions with Specialists or Other Care Team Provider: None    Education and Discussions with Family / Patient: Updated  (niece) via phone  Current Length of Stay: 2 day(s)  Current Patient Status: Inpatient   Discharge Plan: Anticipate discharge in 24-48 hrs to rehab facility  Code Status: Level 3 - DNAR and DNI    Subjective:   Patient seen and examined in bed  No pain or discomfort  Eating and drinking ok  No SOB, abdominal pain, N/V  Objective:     Vitals:   Temp (24hrs), Av 7 °F (36 5 °C), Min:97 3 °F (36 3 °C), Max:98 °F (36 7 °C)    Temp:  [97 3 °F (36 3 °C)-98 °F (36 7 °C)] 97 3 °F (36 3 °C)  HR:  [76-92] 92  Resp:  [16-18] 18  BP: (107-122)/(63-71) 122/71  SpO2:  [92 %-95 %] 92 %  There is no height or weight on file to calculate BMI  Input and Output Summary (last 24 hours): Intake/Output Summary (Last 24 hours) at 2022 1706  Last data filed at 2022 1449  Gross per 24 hour   Intake 720 ml   Output 1350 ml   Net -630 ml       Physical Exam:   Physical Exam  Constitutional:       Appearance: Normal appearance  Cardiovascular:      Rate and Rhythm: Normal rate and regular rhythm  Pulmonary:      Effort: Pulmonary effort is normal       Breath sounds: Normal breath sounds  Abdominal:      Palpations: Abdomen is soft  Tenderness: There is no abdominal tenderness  Musculoskeletal:      Right lower leg: No edema  Left lower leg: No edema  Skin:     General: Skin is warm and dry  Neurological:      Mental Status: She is alert  Comments: Oriented to person     Psychiatric:         Mood and Affect: Mood normal          Behavior: Behavior normal           Additional Data:     Labs:  Results from last 7 days   Lab Units 22  0453 22  0643 22  0643   WBC Thousand/uL 6 61   < > 6 32   HEMOGLOBIN g/dL 13 7   < > 13 6   HEMATOCRIT % 42 0   < > 41 5   PLATELETS Thousands/uL 226   < > 216   NEUTROS PCT %  --   --  49   LYMPHS PCT %  --   --  27   MONOS PCT %  --   --  13*   EOS PCT %  --   --  10*    < > = values in this interval not displayed  Results from last 7 days   Lab Units 02/04/22  0453 02/03/22  0643 02/03/22  0643   SODIUM mmol/L 140   < > 135*   POTASSIUM mmol/L 3 9   < > 4 1   CHLORIDE mmol/L 108   < > 104   CO2 mmol/L 24   < > 25   BUN mg/dL 22   < > 26*   CREATININE mg/dL 0 74   < > 0 75   ANION GAP mmol/L 8   < > 6   CALCIUM mg/dL 9 7   < > 9 8   ALBUMIN g/dL  --   --  2 7*   TOTAL BILIRUBIN mg/dL  --   --  1 17*   ALK PHOS U/L  --   --  105   ALT U/L  --   --  48   AST U/L  --   --  60*   GLUCOSE RANDOM mg/dL 80   < > 92    < > = values in this interval not displayed  Results from last 7 days   Lab Units 02/04/22 0453   INR  2 41*                   Lines/Drains:  Invasive Devices  Report    Peripheral Intravenous Line            Peripheral IV 02/02/22 Right;Ventral (anterior) Wrist 2 days              Imaging: Reviewed radiology reports from this admission including: CT left leg    Recent Cultures (last 7 days):         Last 24 Hours Medication List:   Current Facility-Administered Medications   Medication Dose Route Frequency Provider Last Rate    acetaminophen  975 mg Oral Q8H Alexandra Bernie, DO      atorvastatin  20 mg Oral Daily Michael Alonso MD      vitamin B-12  1,000 mcg Oral Daily Michael Alonso MD      Diclofenac Sodium  2 g Topical 4x Daily Vickey Call DO      donepezil  10 mg Oral HS Michael Alonso MD      lisinopril-hydrochlorothiazide (PRINZIDE 20/12  5) combo dose   Oral BID Michael Alonso MD      metoprolol tartrate  25 mg Oral BID Michael Alonso MD      potassium chloride  20 mEq Oral Daily Michael Alonso MD      warfarin  3 mg Oral Daily (warfarin) Michael Alonso MD          Today, Patient Was Seen By: Steve Sánchez, DO    **Please Note: This note may have been constructed using a voice recognition system  **

## 2022-02-05 NOTE — ASSESSMENT & PLAN NOTE
POA, due to frequent falls  Nursing team at [facility] report that patient has had gradual deconditioning  Patient fell for the 1st time a few weeks ago, then 2/1 fell 3 times in 1 hour, and was brought to ED  Evaluation was completely normal and was sent back to [facility]  Patient returns 2/2/22 due to [facility] facility being unable to provide assistance for patient and requiring rehabilitation placement  CXR, CT of head without contrast, and x-ray of hip are all between normal limits  Plan  · PT eval - recommended post acute rehab  · MRI of lumbar spine discontinued as there is not any reason for it as she can raise her left leg against gravity and resist  · Case management on board with rehab availability   · CT leg/hip negative for acute fracture   Some mild OA

## 2022-02-05 NOTE — DISCHARGE SUMMARY
[facility]  Discharge- Lena Santi 1935, 80 y o  female MRN: [id number]  Unit/Bed#: S -01 Encounter: [id number]  Primary Care Provider: Andrew Higgins MD   Date and time admitted to hospital: 2/2/2022  1:15 PM    * One Francisco Gutierrez, due to frequent falls  Nursing team at [facility] report that patient has had gradual deconditioning and requiring rehab placement  Patient fell 3x in an hr on 2/1/22 and was brought to ED  Evaluation was completely normal and was sent back to [facility]  Patient returned 2/2/22 due to [facility] facility being unable to provide assistance for patient and requiring rehabilitation placement  CXR, CT of head without contrast, and x-ray of hip are all between normal limits  CT leg/hip negative for acute fracture  Some mild OA  PT/OT eval - recommended post acute rehab    Plan  · Continue scheduled acetaminophen 975 mg q 8 hours  · Case management on board with rehab placement  · Patient is medically stable to be discharged to [address] in [address] [place] [address]    Atrial fibrillation [facility])  Assessment & Plan  Continue home med: warfarin 3 mg daily  INR therapeutic (goal 2-3)    · Recheck PT/INR in 3 days after discharge  · Will possibly need to discontinue warfarin moving forward with the fall risk  This will need to be a further discussion with family and PCP    Benign essential hypertension  Assessment & Plan  Home medications include Norvasc 5 mg daily, lisinopril-HCTZ 20/12 5 daily, Lopressor 25 b i d      Plan  · Continue lisinopril-HCTZ and Lopressor  · Discontinue Norvasc 5mg daily due to blood pressure being soft    Late onset Alzheimer's disease without behavioral disturbance (HCC)  Assessment & Plan  Continue home regimen Aricept 10 mg at bedtime        Medical Problems             Resolved Problems  Date Reviewed: 2/6/2022    None              Discharging Resident: Guanako Gross MD  Discharging Attending: Francesca Man MD  PCP: Lavina Gosselin, MD  Admission Date:   Admission Orders (From admission, onward)     Ordered        02/03/22 1145  Inpatient Admission  Once            02/02/22 1638  Place in Observation  Once                      Discharge Date: 02/06/22    Consultations During Hospital Stay:  · None    Procedures Performed:   · None    Significant Findings / Test Results:   · 02/04/2022:  CT lower extremity without contrast left:  Mild hip osteoarthritis  Moderate pubic symphysis osteoarthritis  No acute osseous abnormality  Incidental Findings:   · None     Test Results Pending at Discharge (will require follow up): · None     Outpatient Tests Requested:  · Recheck PT/INR in 3 days after discharge    Complications:  None    Reason for Admission:  [facility] Course:   Ana Jones is a 80 y o  female patient from [facility] who originally presented to the hospital on 2/2/2022 due to frequent falls, ambulatory dysfunction and physical deconditioning  Prior to this admission, patient presented in this same ED 02/01 due to falls 3 times in 1 hour  Trauma Service evaluated and sent back the patient to the facility when no acute fracture was found  However, the facility sent patient back on 2/2/2022 because they could not provide assistance/needs for the patient requiring rehabilitation  In this hospitalization, chest x-ray, CT head without contrast, x-ray of hip showed no acute fracture or pathology  Upon careful physical examination, it was found that patient could not flex her left knee or raise her left leg up due to pain in deep hip region  CT scan of the hip showed hip and pubic symphysis osteoarthritis  No acute fracture  Patient is medically stable to be discharged to [facility] in [address]  Please see above list of diagnoses and related plan for additional information       Condition at Discharge: stable    Discharge Day Visit / Exam:   Subjective:  Patient is doing well this morning  No new complaint  She had normal bowel movement yesterday  Vitals: Blood Pressure: 127/59 (02/06/22 0700)  Pulse: (!) 115 (02/06/22 0700)  Temperature: 98 1 °F (36 7 °C) (02/06/22 0700)  Temp Source: Oral (02/06/22 0700)  Respirations: 18 (02/06/22 0700)  SpO2: 95 % (02/06/22 0700)  Exam:   Physical Exam  Vitals and nursing note reviewed  Constitutional:       General: She is not in acute distress  Appearance: She is well-developed  HENT:      Head: Normocephalic and atraumatic  Eyes:      Conjunctiva/sclera: Conjunctivae normal    Cardiovascular:      Rate and Rhythm: Normal rate and regular rhythm  Heart sounds: No murmur heard  Pulmonary:      Effort: Pulmonary effort is normal  No respiratory distress  Breath sounds: Normal breath sounds  Abdominal:      Palpations: Abdomen is soft  Tenderness: There is no abdominal tenderness  Musculoskeletal:      Cervical back: Neck supple  Right lower leg: Edema present  Left lower leg: Edema present  Comments: Trace edema  Pain with flexion of left hip   Skin:     General: Skin is warm and dry  Neurological:      Mental Status: She is alert  Sensory: No sensory deficit  Motor: Weakness present  Comments: Left leg weakness due to hip pain          Discussion with Family: Updated  (niece) via phone  Discharge instructions/Information to patient and family:   See after visit summary for information provided to patient and family  Provisions for Follow-Up Care:  See after visit summary for information related to follow-up care and any pertinent home health orders  Disposition:   Acute Rehab at [place], [place]    Planned Readmission:  None    Discharge Medications:  See after visit summary for reconciled discharge medications provided to patient and/or family  **Please Note: This note may have been constructed using a voice recognition system**

## 2022-02-05 NOTE — PLAN OF CARE
Problem: MOBILITY - ADULT  Goal: Maintain or return to baseline ADL function  Description: INTERVENTIONS:  -  Assess patient's ability to carry out ADLs; assess patient's baseline for ADL function and identify physical deficits which impact ability to perform ADLs (bathing, care of mouth/teeth, toileting, grooming, dressing, etc )  - Assess/evaluate cause of self-care deficits   - Assess range of motion  - Assess patient's mobility; develop plan if impaired  - Assess patient's need for assistive devices and provide as appropriate  - Encourage maximum independence but intervene and supervise when necessary  - Involve family in performance of ADLs  - Assess for home care needs following discharge   - Consider OT consult to assist with ADL evaluation and planning for discharge  - Provide patient education as appropriate  Outcome: Progressing  Goal: Maintains/Returns to pre admission functional level  Description: INTERVENTIONS:  - Perform BMAT or MOVE assessment daily    - Set and communicate daily mobility goal to care team and patient/family/caregiver     - Collaborate with rehabilitation services on mobility goals if consulted  - Perform Range of Motion  - Reposition patient   - Dangle patient   - Out of bed for meals   - Out of bed for toileting  - Record patient progress and toleration of activity level   Outcome: Progressing     Problem: Prexisting or High Potential for Compromised Skin Integrity  Goal: Skin integrity is maintained or improved  Description: INTERVENTIONS:  - Identify patients at risk for skin breakdown  - Assess and monitor skin integrity  - Assess and monitor nutrition and hydration status  - Monitor labs   - Assess for incontinence   - Turn and reposition patient  - Assist with mobility/ambulation  - Relieve pressure over bony prominences  - Avoid friction and shearing  - Provide appropriate hygiene as needed including keeping skin clean and dry  - Evaluate need for skin moisturizer/barrier cream  - Collaborate with interdisciplinary team   - Patient/family teaching  - Consider wound care consult   Outcome: Progressing     Problem: PAIN - ADULT  Goal: Verbalizes/displays adequate comfort level or baseline comfort level  Description: Interventions:  - Encourage patient to monitor pain and request assistance  - Assess pain using appropriate pain scale  - Administer analgesics based on type and severity of pain and evaluate response  - Implement non-pharmacological measures as appropriate and evaluate response  - Consider cultural and social influences on pain and pain management  - Notify physician/advanced practitioner if interventions unsuccessful or patient reports new pain  Outcome: Progressing     Problem: INFECTION - ADULT  Goal: Absence or prevention of progression during hospitalization  Description: INTERVENTIONS:  - Assess and monitor for signs and symptoms of infection  - Monitor lab/diagnostic results  - Monitor all insertion sites, i e  indwelling lines, tubes, and drains  - Monitor endotracheal if appropriate and nasal secretions for changes in amount and color  - [place] appropriate cooling/warming therapies per order  - Administer medications as ordered  - Instruct and encourage patient and family to use good hand hygiene technique  - Identify and instruct in appropriate isolation precautions for identified infection/condition  Outcome: Progressing  Goal: Absence of fever/infection during neutropenic period  Description: INTERVENTIONS:  - Monitor WBC    Outcome: Progressing     Problem: SAFETY ADULT  Goal: Maintain or return to baseline ADL function  Description: INTERVENTIONS:  -  Assess patient's ability to carry out ADLs; assess patient's baseline for ADL function and identify physical deficits which impact ability to perform ADLs (bathing, care of mouth/teeth, toileting, grooming, dressing, etc )  - Assess/evaluate cause of self-care deficits   - Assess range of motion  - Assess patient's mobility; develop plan if impaired  - Assess patient's need for assistive devices and provide as appropriate  - Encourage maximum independence but intervene and supervise when necessary  - Involve family in performance of ADLs  - Assess for home care needs following discharge   - Consider OT consult to assist with ADL evaluation and planning for discharge  - Provide patient education as appropriate  Outcome: Progressing  Goal: Maintains/Returns to pre admission functional level  Description: INTERVENTIONS:  - Perform BMAT or MOVE assessment daily    - Set and communicate daily mobility goal to care team and patient/family/caregiver     - Collaborate with rehabilitation services on mobility goals if consulted    - Out of bed for toileting  - Record patient progress and toleration of activity level   Outcome: Progressing  Goal: Patient will remain free of falls  Description: INTERVENTIONS:  - Educate patient/family on patient safety including physical limitations  - Instruct patient to call for assistance with activity   - Consult OT/PT to assist with strengthening/mobility   - Keep Call bell within reach  - Keep bed low and locked with side rails adjusted as appropriate  - Keep care items and personal belongings within reach  - Initiate and maintain comfort rounds  - Make Fall Risk Sign visible to staff  - Apply yellow socks and bracelet for high fall risk patients  - Consider moving patient to room near nurses station  Outcome: Progressing     Problem: DISCHARGE PLANNING  Goal: Discharge to home or other facility with appropriate resources  Description: INTERVENTIONS:  - Identify barriers to discharge w/patient and caregiver  - Arrange for needed discharge resources and transportation as appropriate  - Identify discharge learning needs (meds, wound care, etc )  - Arrange for interpretive services to assist at discharge as needed  - Refer to Case Management Department for coordinating discharge planning if the patient needs post-hospital services based on physician/advanced practitioner order or complex needs related to functional status, cognitive ability, or social support system  Outcome: Progressing     Problem: Knowledge Deficit  Goal: Patient/family/caregiver demonstrates understanding of disease process, treatment plan, medications, and discharge instructions  Description: Complete learning assessment and assess knowledge base    Interventions:  - Provide teaching at level of understanding  - Provide teaching via preferred learning methods  Outcome: Progressing     Problem: Potential for Falls  Goal: Patient will remain free of falls  Description: INTERVENTIONS:  - Educate patient/family on patient safety including physical limitations  - Instruct patient to call for assistance with activity   - Consult OT/PT to assist with strengthening/mobility   - Keep Call bell within reach  - Keep bed low and locked with side rails adjusted as appropriate  - Keep care items and personal belongings within reach  - Initiate and maintain comfort rounds  - Make Fall Risk Sign visible to staff  - Apply yellow socks and bracelet for high fall risk patients  - Consider moving patient to room near nurses station  Outcome: Progressing

## 2022-02-06 VITALS
RESPIRATION RATE: 18 BRPM | TEMPERATURE: 98.1 F | HEART RATE: 115 BPM | SYSTOLIC BLOOD PRESSURE: 127 MMHG | OXYGEN SATURATION: 95 % | DIASTOLIC BLOOD PRESSURE: 59 MMHG

## 2022-02-06 LAB
ANION GAP SERPL CALCULATED.3IONS-SCNC: 4 MMOL/L (ref 4–13)
BUN SERPL-MCNC: 31 MG/DL (ref 5–25)
CALCIUM SERPL-MCNC: 9.5 MG/DL (ref 8.3–10.1)
CHLORIDE SERPL-SCNC: 106 MMOL/L (ref 100–108)
CO2 SERPL-SCNC: 26 MMOL/L (ref 21–32)
CREAT SERPL-MCNC: 0.88 MG/DL (ref 0.6–1.3)
GFR SERPL CREATININE-BSD FRML MDRD: 59 ML/MIN/1.73SQ M
GLUCOSE SERPL-MCNC: 87 MG/DL (ref 65–140)
INR PPP: 2.41 (ref 0.84–1.19)
POTASSIUM SERPL-SCNC: 4.5 MMOL/L (ref 3.5–5.3)
PROTHROMBIN TIME: 25.8 SECONDS (ref 11.6–14.5)
SODIUM SERPL-SCNC: 136 MMOL/L (ref 136–145)

## 2022-02-06 PROCEDURE — 99239 HOSP IP/OBS DSCHRG MGMT >30: CPT | Performed by: INTERNAL MEDICINE

## 2022-02-06 PROCEDURE — 80048 BASIC METABOLIC PNL TOTAL CA: CPT | Performed by: INTERNAL MEDICINE

## 2022-02-06 PROCEDURE — 85610 PROTHROMBIN TIME: CPT | Performed by: INTERNAL MEDICINE

## 2022-02-06 RX ORDER — ACETAMINOPHEN 325 MG/1
975 TABLET ORAL EVERY 8 HOURS SCHEDULED
Refills: 0
Start: 2022-02-06 | End: 2022-06-08

## 2022-02-06 RX ADMIN — METOPROLOL TARTRATE 25 MG: 25 TABLET, FILM COATED ORAL at 10:06

## 2022-02-06 RX ADMIN — CYANOCOBALAMIN TAB 500 MCG 1000 MCG: 500 TAB at 10:06

## 2022-02-06 RX ADMIN — HYDROCHLOROTHIAZIDE: 12.5 TABLET ORAL at 10:05

## 2022-02-06 RX ADMIN — ACETAMINOPHEN 975 MG: 325 TABLET, FILM COATED ORAL at 06:04

## 2022-02-06 RX ADMIN — ATORVASTATIN CALCIUM 20 MG: 20 TABLET, FILM COATED ORAL at 10:06

## 2022-02-06 RX ADMIN — ACETAMINOPHEN 975 MG: 325 TABLET, FILM COATED ORAL at 14:14

## 2022-02-06 RX ADMIN — POTASSIUM CHLORIDE 20 MEQ: 1500 TABLET, EXTENDED RELEASE ORAL at 10:06

## 2022-02-06 NOTE — DISCHARGE INSTRUCTIONS
Fall Prevention   AMBULATORY CARE:   Fall prevention  includes ways to make your home and other areas safer  It also includes ways you can move more carefully to prevent a fall  Health conditions that cause changes in your blood pressure, vision, or muscle strength and coordination may increase your risk for falls  Medicines may also increase your risk for falls if they make you dizzy, weak, or sleepy  Call 911 or have someone else call if:   · You have fallen and are unconscious  · You have fallen and cannot move part of your body  Contact your healthcare provider if:   · You have fallen and have pain or a headache  · You have questions or concerns about your condition or care  Fall prevention tips:   · Stand or sit up slowly  This may help you keep your balance and prevent falls  · Use assistive devices as directed  Your healthcare provider may suggest that you use a cane or walker to help you keep your balance  You may need to have grab bars put in your bathroom near the toilet or in the shower  · Wear shoes that fit well and have soles that   Wear shoes both inside and outside  Use slippers with good   Do not wear shoes with high heels  · Wear a personal alarm  This is a device that allows you to call 911 if you fall and need help  Ask your healthcare provider for more information  · Stay active  Exercise can help strengthen your muscles and improve your balance  Your healthcare provider may recommend water aerobics or walking  He or she may also recommend physical therapy to improve your coordination  Never start an exercise program without talking to your healthcare provider first          · Manage your medical conditions  Keep all appointments with your healthcare providers  Visit your eye doctor as directed  Home safety tips:       · Add items to prevent falls in the bathroom  Put nonslip strips on your bath or shower floor to prevent you from slipping   Use a bath mat if you do not have carpet in the bathroom  This will prevent you from falling when you step out of the bath or shower  Use a shower seat so you do not need to stand while you shower  Sit on the toilet or a chair in your bathroom to dry yourself and put on clothing  This will prevent you from losing your balance from drying or dressing yourself while you are standing  · Keep paths clear  Remove books, shoes, and other objects from walkways and stairs  Place cords for telephones and lamps out of the way so that you do not need to walk over them  Tape them down if you cannot move them  Remove small rugs  If you cannot remove a rug, secure it with double-sided tape  This will prevent you from tripping  · Install bright lights in your home  Use night lights to help light paths to the bathroom or kitchen  Always turn on the light before you start walking  · Keep items you use often on shelves within reach  Do not use a step stool to help you reach an item  · Paint or place reflective tape on the edges of your stairs  This will help you see the stairs better  Follow up with your doctor as directed:  Write down your questions so you remember to ask them during your visits  © Copyright [employer] 2021 Information is for End User's use only and may not be sold, redistributed or otherwise used for commercial purposes  All illustrations and images included in CareNotes® are the copyrighted property of A D A M , Inc  or [employer]   The above information is an  only  It is not intended as medical advice for individual conditions or treatments  Talk to your doctor, nurse or pharmacist before following any medical regimen to see if it is safe and effective for you

## 2022-02-06 NOTE — ASSESSMENT & PLAN NOTE
Continue home med: warfarin 3 mg daily  INR therapeutic (goal 2-3)    · Recheck PT/INR in 3 days after discharge  · Will possibly need to discontinue warfarin moving forward with the fall risk   This will need to be a further discussion with family and PCP

## 2022-02-06 NOTE — PLAN OF CARE
Problem: MOBILITY - ADULT  Goal: Maintain or return to baseline ADL function  Description: INTERVENTIONS:  -  Assess patient's ability to carry out ADLs; assess patient's baseline for ADL function and identify physical deficits which impact ability to perform ADLs (bathing, care of mouth/teeth, toileting, grooming, dressing, etc )  - Assess/evaluate cause of self-care deficits   - Assess range of motion  - Assess patient's mobility; develop plan if impaired  - Assess patient's need for assistive devices and provide as appropriate  - Encourage maximum independence but intervene and supervise when necessary  - Involve family in performance of ADLs  - Assess for home care needs following discharge   - Consider OT consult to assist with ADL evaluation and planning for discharge  - Provide patient education as appropriate  Outcome: Progressing  Goal: Maintains/Returns to pre admission functional level  Description: INTERVENTIONS:  - Perform BMAT or MOVE assessment daily    - Set and communicate daily mobility goal to care team and patient/family/caregiver     - Collaborate with rehabilitation services on mobility goals if consulted  - Out of bed for toileting  - Record patient progress and toleration of activity level   Outcome: Progressing     Problem: Prexisting or High Potential for Compromised Skin Integrity  Goal: Skin integrity is maintained or improved  Description: INTERVENTIONS:  - Identify patients at risk for skin breakdown  - Assess and monitor skin integrity  - Assess and monitor nutrition and hydration status  - Monitor labs   - Assess for incontinence   - Turn and reposition patient  - Assist with mobility/ambulation  - Relieve pressure over bony prominences  - Avoid friction and shearing  - Provide appropriate hygiene as needed including keeping skin clean and dry  - Evaluate need for skin moisturizer/barrier cream  - Collaborate with interdisciplinary team   - Patient/family teaching  - Consider wound care consult   Outcome: Progressing     Problem: PAIN - ADULT  Goal: Verbalizes/displays adequate comfort level or baseline comfort level  Description: Interventions:  - Encourage patient to monitor pain and request assistance  - Assess pain using appropriate pain scale  - Administer analgesics based on type and severity of pain and evaluate response  - Implement non-pharmacological measures as appropriate and evaluate response  - Consider cultural and social influences on pain and pain management  - Notify physician/advanced practitioner if interventions unsuccessful or patient reports new pain  Outcome: Progressing     Problem: INFECTION - ADULT  Goal: Absence or prevention of progression during hospitalization  Description: INTERVENTIONS:  - Assess and monitor for signs and symptoms of infection  - Monitor lab/diagnostic results  - Monitor all insertion sites, i e  indwelling lines, tubes, and drains  - Monitor endotracheal if appropriate and nasal secretions for changes in amount and color  - [place] appropriate cooling/warming therapies per order  - Administer medications as ordered  - Instruct and encourage patient and family to use good hand hygiene technique  - Identify and instruct in appropriate isolation precautions for identified infection/condition  Outcome: Progressing  Goal: Absence of fever/infection during neutropenic period  Description: INTERVENTIONS:  - Monitor WBC    Outcome: Progressing     Problem: SAFETY ADULT  Goal: Maintain or return to baseline ADL function  Description: INTERVENTIONS:  -  Assess patient's ability to carry out ADLs; assess patient's baseline for ADL function and identify physical deficits which impact ability to perform ADLs (bathing, care of mouth/teeth, toileting, grooming, dressing, etc )  - Assess/evaluate cause of self-care deficits   - Assess range of motion  - Assess patient's mobility; develop plan if impaired  - Assess patient's need for assistive devices and provide as appropriate  - Encourage maximum independence but intervene and supervise when necessary  - Involve family in performance of ADLs  - Assess for home care needs following discharge   - Consider OT consult to assist with ADL evaluation and planning for discharge  - Provide patient education as appropriate  Outcome: Progressing  Goal: Maintains/Returns to pre admission functional level  Description: INTERVENTIONS:  - Perform BMAT or MOVE assessment daily    - Set and communicate daily mobility goal to care team and patient/family/caregiver  - Collaborate with rehabilitation services on mobility goals if consulted  - Perform Range of Motion 4 times a day  - Reposition patient every 4 hours    - Dangle patient 4 times a day  - Out of bed for toileting  - Record patient progress and toleration of activity level   Outcome: Progressing  Goal: Patient will remain free of falls  Description: INTERVENTIONS:  - Educate patient/family on patient safety including physical limitations  - Instruct patient to call for assistance with activity   - Consult OT/PT to assist with strengthening/mobility   - Keep Call bell within reach  - Keep bed low and locked with side rails adjusted as appropriate  - Keep care items and personal belongings within reach  - Initiate and maintain comfort rounds  - Make Fall Risk Sign visible to staff  - Offer Toileting every 2 Hours, in advance of need  - Initiate/Maintain bed alarm  - Apply yellow socks and bracelet for high fall risk patients  - Consider moving patient to room near nurses station  Outcome: Progressing     Problem: DISCHARGE PLANNING  Goal: Discharge to home or other facility with appropriate resources  Description: INTERVENTIONS:  - Identify barriers to discharge w/patient and caregiver  - Arrange for needed discharge resources and transportation as appropriate  - Identify discharge learning needs (meds, wound care, etc )  - Arrange for interpretive services to assist at discharge as needed  - Refer to Case Management Department for coordinating discharge planning if the patient needs post-hospital services based on physician/advanced practitioner order or complex needs related to functional status, cognitive ability, or social support system  Outcome: Progressing     Problem: Knowledge Deficit  Goal: Patient/family/caregiver demonstrates understanding of disease process, treatment plan, medications, and discharge instructions  Description: Complete learning assessment and assess knowledge base    Interventions:  - Provide teaching at level of understanding  - Provide teaching via preferred learning methods  Outcome: Progressing     Problem: Potential for Falls  Goal: Patient will remain free of falls  Description: INTERVENTIONS:  - Educate patient/family on patient safety including physical limitations  - Instruct patient to call for assistance with activity   - Consult OT/PT to assist with strengthening/mobility   - Keep Call bell within reach  - Keep bed low and locked with side rails adjusted as appropriate  - Keep care items and personal belongings within reach  - Initiate and maintain comfort rounds  - Make Fall Risk Sign visible to staff  - Offer Toileting every 2 Hours, in advance of need  - Initiate/Maintain bed alarm  - Apply yellow socks and bracelet for high fall risk patients  - Consider moving patient to room near nurses station  Outcome: Progressing

## 2022-02-06 NOTE — ASSESSMENT & PLAN NOTE
POA, due to frequent falls  Nursing team at [facility] report that patient has had gradual deconditioning and requiring rehab placement  Patient fell 3x in an hr on 2/1/22 and was brought to ED  Evaluation was completely normal and was sent back to [facility]  Patient returned 2/2/22 due to residential facility being unable to provide assistance for patient and requiring rehabilitation placement  CXR, CT of head without contrast, and x-ray of hip are all between normal limits  CT leg/hip negative for acute fracture   Some mild OA  PT/OT eval - recommended post acute rehab    Plan  · Continue scheduled acetaminophen 975 mg q 8 hours  · Case management on board with rehab placement  · Patient is medically stable to be discharged [facility] in [facility]

## 2022-02-06 NOTE — CASE MANAGEMENT
Case Management Discharge Planning Note    Patient name Dallas Knowles S [address] Barak 87 431/S -[id number] MRN [id number]  : 1935 Date 2022       Current Admission Date: 2022  Current Admission Diagnosis:Fall   Patient Active Problem List    Diagnosis Date Noted   Paola Prince Fall 2022    Hyperglycemia 2021    Essential hypertension 2021    Persistent atrial fibrillation ([facility] ) 2021    Mixed hyperlipidemia 2021    Late onset Alzheimer's disease without behavioral disturbance ([facility] ) 2021    Hematoma 2021    Memory deficit 2018    Aortic stenosis, mild 2017    Sick sinus syndrome ([facility] ) 2017    Endometrial hyperplasia 2016    Hyperlipidemia, mixed 2015    Atrial fibrillation ([facility] ) 10/01/2014    Benign essential hypertension 2013    Kidney carcinoma ([place] [address] ) 2013      LOS (days): 3  Geometric Mean LOS (GMLOS) (days): 3 00  Days to GMLOS:0 1     OBJECTIVE:  Risk of Unplanned Readmission Score: 16         Current admission status: Inpatient   [facility]  [employer] [place] [address]  Phone: [phone] Fax: [phone]    Primary Care Provider: Checo Murray MD    Primary Insurance: MEDICARE  Secondary Insurance: [facility]    DISCHARGE DETAILS:        Transport at Discharge : [facility]  Dispatcher Contacted: Yes  Number/Name of Dispatcher: Irina Notice of Transport (Date): 22  ETA of Transport (Time): 1400     Transfer Mode: Stretcher  Accompanied by: EMS personnel           Accepting Facility Name, [id number] [facility]  Receiving Facility/Agency Phone Number: [phone]     Transportation has been arrange with [facility] for [phone]

## 2022-02-06 NOTE — ASSESSMENT & PLAN NOTE
Takes warfarin 3 mg daily  INR therapeutic (goal 2-3)    Plan  · Continue home regimen  · Monitor INR due to warfarin  · Will possibly need to discontinue warfarin moving forward with the fall risk   This will need to be a further discussion with family and PCP

## 2022-02-06 NOTE — ASSESSMENT & PLAN NOTE
POA, due to frequent falls  Nursing team at [facility] report that patient has had gradual deconditioning and requiring rehab placement  Patient fell 3x in an hr on 2/1/22 and was brought to ED  Evaluation was completely normal and was sent back to [facility]  Patient returned 2/2/22 due to retirement facility being unable to provide assistance for patient and requiring rehabilitation placement  CXR, CT of head without contrast, and x-ray of hip are all between normal limits  CT leg/hip negative for acute fracture   Some mild OA  PT/OT eval - recommended post acute rehab    Plan  · Continue scheduled acetaminophen 975 mg q 8 hours  · Case management on board with rehab placement  · Patient is medically stable to be discharged to [facility] in [place], [place]

## 2022-02-06 NOTE — ASSESSMENT & PLAN NOTE
Home medications include Norvasc 5 mg daily, lisinopril-HCTZ 20/12 5 daily, Lopressor 25 b i d      Plan  · Continue lisinopril-HCTZ and Lopressor  · Discontinue Norvasc 5mg daily due to blood pressure being soft

## 2022-02-07 ENCOUNTER — TRANSITIONAL CARE MANAGEMENT (OUTPATIENT)
Dept: FAMILY MEDICINE CLINIC | Facility: CLINIC | Age: 87
End: 2022-02-07

## 2022-02-09 ENCOUNTER — TELEPHONE (OUTPATIENT)
Dept: PULMONOLOGY | Facility: CLINIC | Age: 87
End: 2022-02-09

## 2022-02-09 NOTE — TELEPHONE ENCOUNTER
Matt Cavanaugh called the office to schedule an appointment for Meltyson Aleman, one week after her hospital discharge  I was reviewing the notes and this patient has not been seen by Pulmonary  Matt Cavanaugh was asking for an appointment with Efe Lainez but Mejia Knife would be a new patient  Please call Matt Cavanaugh back and schedule accordingly  Thank you

## 2022-02-09 NOTE — TELEPHONE ENCOUNTER
Narciso Zavala , from the same office as Rajat Lind, called back to see if [facility] had made Dareen Spire and appt  I advised Narciso Zavala of Katelyn's not above  She asked if I could make an appt for Darjuan Spire and then have the [place] office call her back if they can find something sooner   I scheduled appt for 4/7/22 @ 2pm and put on the wait list   Please advise

## 2022-04-14 ENCOUNTER — NURSING HOME VISIT (OUTPATIENT)
Dept: FAMILY MEDICINE CLINIC | Facility: CLINIC | Age: 87
End: 2022-04-14
Payer: MEDICARE

## 2022-04-14 DIAGNOSIS — I10 BENIGN ESSENTIAL HYPERTENSION: ICD-10-CM

## 2022-04-14 DIAGNOSIS — C64.9 CARCINOMA OF KIDNEY, UNSPECIFIED LATERALITY (HCC): ICD-10-CM

## 2022-04-14 DIAGNOSIS — I48.21 PERMANENT ATRIAL FIBRILLATION (HCC): Primary | ICD-10-CM

## 2022-04-14 DIAGNOSIS — E78.2 MIXED HYPERLIPIDEMIA: ICD-10-CM

## 2022-04-14 DIAGNOSIS — F02.80 LATE ONSET ALZHEIMER'S DISEASE WITHOUT BEHAVIORAL DISTURBANCE (HCC): ICD-10-CM

## 2022-04-14 DIAGNOSIS — G30.1 LATE ONSET ALZHEIMER'S DISEASE WITHOUT BEHAVIORAL DISTURBANCE (HCC): ICD-10-CM

## 2022-04-14 PROCEDURE — 99337 PR DOM/R-HOME E/M EST PT SIGNIF NEW PROB 60 MINUTES: CPT | Performed by: FAMILY MEDICINE

## 2022-05-01 VITALS
SYSTOLIC BLOOD PRESSURE: 118 MMHG | RESPIRATION RATE: 16 BRPM | TEMPERATURE: 97.4 F | DIASTOLIC BLOOD PRESSURE: 78 MMHG | HEART RATE: 86 BPM

## 2022-05-02 NOTE — PROGRESS NOTES
Assessment/Plan:         Problem List Items Addressed This Visit        Cardiovascular and Mediastinum    Atrial fibrillation ([facility] ) - Primary     Continue with anticogulation and rate control of heart rate  Concerns about condition were addressed today  Benign essential hypertension     Patient is stable with current anti-hypertensive medicine and continue to follow a low sodium diet and take current medication  All questions about this condition were answered today  Relevant Orders    TSH + Free T4    Comprehensive metabolic panel    CBC and differential       Nervous and Auditory    Late onset Alzheimer's disease without behavioral disturbance ([facility] )     Patient is stable  and will continue present plan of care and reassess at next routine visit  All questions about this problem from patient were answered today  Genitourinary    Kidney carcinoma ([facility] )       Other    Mixed hyperlipidemia     Patient  is stable with current medication and we discussed a low fat low cholesterol diet  Weight loss also discussed for this will help lower cholesterol also  Recheck lipids in 6 months  Relevant Orders    Lipid Panel with Direct LDL reflex            Subjective:      Patient ID: Chuckie Torres is a 80 y o  female  This 26-year-old female for her yearly history and physical and assisted-living facility  Patient has dementia hypertension atrial fibrillation increased cholesterol and has been stable  History of appetite appendectomy bilateral cataract repair also tonsillectomy and adenectomy in the past   She is single individual she has never  no children she is a nonsmoker does not use any alcohol  She is retired to restore owner  Labs were ordered for her for her baseline her medicines have been reviewed and renewed        The following portions of the patient's history were reviewed and updated as appropriate:   Past Medical History:  She has a past medical history of Essential hypertension (4/11/2021), Late onset [facility] without [facility] ) (4/11/2021), Mixed hyperlipidemia (4/11/2021), and Persistent atrial fibrillation ([facility] ) (4/11/2021)  ,  _______________________________________________________________________  Medical Problems:  does not have any pertinent problems on file ,  _______________________________________________________________________  Past Surgical History:   has a past surgical history that includes Hiatal hernia repair; Eye surgery; Renal mass excision (Right); Tonsillectomy; Cataract extraction, bilateral (Bilateral); Appendectomy; and TONSILECTOMY AND ADNOIDECTOMY ,  _______________________________________________________________________  Family History:  family history includes Dementia in her mother; Heart disease in her brother ,  _______________________________________________________________________  Social History:   reports that she has never smoked  She has never used smokeless tobacco  She reports previous alcohol use  She reports that she does not use drugs  ,  _______________________________________________________________________  Allergies:  has No Known Allergies     _______________________________________________________________________  Current Outpatient Medications   Medication Sig Dispense Refill    acetaminophen (TYLENOL) 325 mg tablet Take 3 tablets (975 mg total) by mouth every 8 (eight) hours  0    atorvastatin (LIPITOR) 20 mg tablet Take 1 tablet (20 mg total) by mouth daily 30 tablet 5    cyanocobalamin (VITAMIN B-12) 1,000 mcg tablet Take 1 tablet (1,000 mcg total) by mouth daily 30 tablet 5    donepezil (ARICEPT) 10 mg tablet Take 1 tablet (10 mg total) by mouth daily at bedtime 30 tablet 5    lisinopril-hydrochlorothiazide (PRINZIDE,ZESTORETIC) 20-12 5 MG per tablet Take 1 tablet by mouth 2 (two) times a day 60 tablet 5    metoprolol tartrate (LOPRESSOR) 25 mg tablet Take 1 tablet (25 mg total) by mouth 2 (two) times a day 60 tablet 5    potassium chloride (KLOR-CON M20) 20 mEq tablet Take 1 tablet (20 mEq total) by mouth daily for 180 days 30 tablet 5    warfarin (COUMADIN) 3 mg tablet Take by mouth daily       No current facility-administered medications for this visit      _______________________________________________________________________  Review of Systems      Objective:  Vitals:    04/14/22 2259   BP: 118/78   Pulse: 86   Resp: 16   Temp: (!) 97 4 °F (36 3 °C)     There is no height or weight on file to calculate BMI       Physical Exam

## 2022-05-05 ENCOUNTER — NURSING HOME VISIT (OUTPATIENT)
Dept: FAMILY MEDICINE CLINIC | Facility: CLINIC | Age: 87
End: 2022-05-05
Payer: MEDICARE

## 2022-05-05 DIAGNOSIS — I10 ESSENTIAL HYPERTENSION: Primary | ICD-10-CM

## 2022-05-05 PROCEDURE — 99335 PR DOM/R-HOME E/M EST PT LW MOD SEVERITY 25 MINUTES: CPT | Performed by: FAMILY MEDICINE

## 2022-05-14 DIAGNOSIS — U07.1 COVID-19: Primary | ICD-10-CM

## 2022-05-19 ENCOUNTER — NURSING HOME VISIT (OUTPATIENT)
Dept: FAMILY MEDICINE CLINIC | Facility: CLINIC | Age: 87
End: 2022-05-19
Payer: MEDICARE

## 2022-05-19 DIAGNOSIS — R21 RASH: Primary | ICD-10-CM

## 2022-05-19 PROCEDURE — 99335 PR DOM/R-HOME E/M EST PT LW MOD SEVERITY 25 MINUTES: CPT | Performed by: FAMILY MEDICINE

## 2022-05-27 RX ORDER — CLOTRIMAZOLE AND BETAMETHASONE DIPROPIONATE 10; .64 MG/G; MG/G
CREAM TOPICAL 2 TIMES DAILY
Qty: 30 G | Refills: 0
Start: 2022-05-27 | End: 2022-06-22

## 2022-05-27 RX ORDER — HYDROCHLOROTHIAZIDE 12.5 MG/1
12.5 TABLET ORAL DAILY
Qty: 30 TABLET | Refills: 11
Start: 2022-05-27

## 2022-05-27 RX ORDER — LISINOPRIL 40 MG/1
40 TABLET ORAL DAILY
Qty: 30 TABLET | Refills: 11
Start: 2022-05-27

## 2022-05-27 NOTE — ASSESSMENT & PLAN NOTE
Patient is stable  and will continue present plan of care and reassess at next routine visit  All questions about this problem from patient were answered today 
UA, will treat for GC/chlamydia

## 2022-05-27 NOTE — PROGRESS NOTES
Assessment/Plan:         Problem List Items Addressed This Visit    None     Visit Diagnoses     Rash    -  Primary    Relevant Medications    clotrimazole-betamethasone (LOTRISONE) 1-0 05 % cream            Subjective:      Patient ID: Octavio Thompson is a 80 y o  female  This 49-year-old lady seen examined today in assisted-living facility  Patient has 2 problems were evaluating her dementia as well as a rash on her backside  Patient has a increased confusion but she is able to ambulate safely within the facility  She is unaware of the rash she is having her backside is noticed by staff  Upon taking down her protective briefs she has erythematous rash that is with blisters on both sides of her anal fold of the buttocks  She has not been scratching Faviola Hodgkin it does not look like shingles because it is bilateral       The following portions of the patient's history were reviewed and updated as appropriate:   Past Medical History:  She has a past medical history of Essential hypertension (4/11/2021), Late onset Alzheimer's disease without behavioral disturbance ([facility] ) (4/11/2021), Mixed hyperlipidemia (4/11/2021), and Pers[facility] ([facility] ) (4/11/2021)  ,  _______________________________________________________________________  Medical Problems:  does not have any pertinent problems on file ,  _______________________________________________________________________  Past Surgical History:   has a past surgical history that includes Hiatal hernia repair; Eye surgery; Renal mass excision (Right); Tonsillectomy; Cataract extraction, bilateral (Bilateral); Appendectomy; and TONSILECTOMY AND ADNOIDECTOMY ,  _______________________________________________________________________  Family History:  family history includes Dementia in her mother; Heart disease in her brother ,  _______________________________________________________________________  Social History:   reports that she has never smoked   She has never used smokeless tobacco  She reports previous alcohol use  She reports that she does not use drugs  ,  _______________________________________________________________________  Allergies:  has No Known Allergies     _______________________________________________________________________  Current Outpatient Medications   Medication Sig Dispense Refill    clotrimazole-betamethasone (LOTRISONE) 1-0 05 % cream Apply topically 2 (two) times a day 30 g 0    acetaminophen (TYLENOL) 325 mg tablet Take 3 tablets (975 mg total) by mouth every 8 (eight) hours  0    atorvastatin (LIPITOR) 20 mg tablet Take 1 tablet (20 mg total) by mouth daily 30 tablet 5    cyanocobalamin (VITAMIN B-12) 1,000 mcg tablet Take 1 tablet (1,000 mcg total) by mouth daily 30 tablet 5    donepezil (ARICEPT) 10 mg tablet Take 1 tablet (10 mg total) by mouth daily at bedtime 30 tablet 5    lisinopril-hydrochlorothiazide (PRINZIDE,ZESTORETIC) 20-12 5 MG per tablet Take 1 tablet by mouth 2 (two) times a day 60 tablet 5    metoprolol tartrate (LOPRESSOR) 25 mg tablet Take 1 tablet (25 mg total) by mouth 2 (two) times a day 60 tablet 5    potassium chloride (KLOR-CON M20) 20 mEq tablet Take 1 tablet (20 mEq total) by mouth daily for 180 days 30 tablet 5    warfarin (COUMADIN) 3 mg tablet Take by mouth daily       No current facility-administered medications for this visit      _______________________________________________________________________  Review of Systems   Constitutional: Negative for activity change, appetite change, fatigue and fever  HENT: Negative for congestion, ear pain, postnasal drip, rhinorrhea, sinus pressure, sinus pain, sneezing and sore throat  Eyes: Negative for pain and redness  Respiratory: Negative for apnea, cough, chest tightness, shortness of breath and wheezing  Cardiovascular: Negative for chest pain, palpitations and leg swelling     Gastrointestinal: Negative for abdominal pain, constipation, diarrhea, nausea and vomiting  Endocrine: Negative for cold intolerance and heat intolerance  Genitourinary: Negative for difficulty urinating, dysuria, frequency, hematuria and urgency  Musculoskeletal: Negative for arthralgias, back pain, gait problem and myalgias  Skin: Positive for rash  Neurological: Negative for dizziness, speech difficulty, weakness, numbness and headaches  Hematological: Does not bruise/bleed easily  Psychiatric/Behavioral: Positive for confusion  Negative for agitation and hallucinations  Objective: There were no vitals filed for this visit  There is no height or weight on file to calculate BMI  Physical Exam  Skin:     Findings: Rash present  Rash is vesicular            Neurological:      Gait: Gait abnormal

## 2022-05-27 NOTE — ASSESSMENT & PLAN NOTE
Will d/c  Lisinopril HCT 20/12/5 mg bid to lisinopril 40 mg daily and HCTZ 12 5mg in AM  Pt  Would like to have less diuretic  Staff to watch BP daily till next week  When I am back in facility

## 2022-05-27 NOTE — PROGRESS NOTES
Assessment/Plan:         Problem List Items Addressed This Visit        Cardiovascular and Mediastinum    Essential hypertension - Primary     Will d/c  Lisinopril HCT 20/12/5 mg bid to lisinopril 40 mg daily and HCTZ 12 5mg in AM  Pt  Would like to have less diuretic  Staff to watch BP daily till next week  When I am back in facility  Relevant Medications    lisinopril (ZESTRIL) 40 mg tablet    hydrochlorothiazide (HYDRODIURIL) 12 5 mg tablet            Subjective:      Patient ID: Mina Kendall is a 80 y o  female  This 59-year-old female seen examined in assisted-living facility  Patient had come to me today to request about decreasing the diuretic medicine her blood pressure medicine  Her blood pressure been stable but she has been taking lisinopril 20/12 51 tablet twice a day  Will switch over to 40 mg of lisinopril in the morning along with the 12 5 hydroch dose of diuretic   lorothiazide  The net decrease of 12 5 hydrochlorothiazide may be enough to prevent her from going to the bathroom so much in the afternoons  Next 2nd dosing in the afternoon was giving her lots of problems  Staff will check her blood pressure daily type come back to see her next week in the facility and will assess her blood pressure control in the lower      The following portions of the patient's history were reviewed and updated as appropriate:   Past Medical History:  She has a past medical history of Essential hypertension (4/11/2021), Late onset Alzheimer's disease without behavioral disturbance ([facility] ) (4/11/2021), Mixed hyperlipidemia (4/11/2021), and [facility] (4/11/2021)  ,  _______________________________________________________________________  Medical Problems:  does not have any pertinent problems on file ,  _______________________________________________________________________  Past Surgical History:   has a past surgical history that includes Hiatal hernia repair;  Eye surgery; Renal mass excision (Right); Tonsillectomy; Cataract extraction, bilateral (Bilateral); Appendectomy; and TONSILECTOMY AND ADNOIDECTOMY ,  _______________________________________________________________________  Family History:  family history includes Dementia in her mother; Heart disease in her brother ,  _______________________________________________________________________  Social History:   reports that she has never smoked  She has never used smokeless tobacco  She reports previous alcohol use  She reports that she does not use drugs  ,  _______________________________________________________________________  Allergies:  has No Known Allergies     _______________________________________________________________________  Current Outpatient Medications   Medication Sig Dispense Refill    hydrochlorothiazide (HYDRODIURIL) 12 5 mg tablet Take 1 tablet (12 5 mg total) by mouth daily 30 tablet 11    lisinopril (ZESTRIL) 40 mg tablet Take 1 tablet (40 mg total) by mouth daily 30 tablet 11    acetaminophen (TYLENOL) 325 mg tablet Take 3 tablets (975 mg total) by mouth every 8 (eight) hours  0    atorvastatin (LIPITOR) 20 mg tablet Take 1 tablet (20 mg total) by mouth daily 30 tablet 5    clotrimazole-betamethasone (LOTRISONE) 1-0 05 % cream Apply topically 2 (two) times a day 30 g 0    cyanocobalamin (VITAMIN B-12) 1,000 mcg tablet Take 1 tablet (1,000 mcg total) by mouth daily 30 tablet 5    donepezil (ARICEPT) 10 mg tablet Take 1 tablet (10 mg total) by mouth daily at bedtime 30 tablet 5    metoprolol tartrate (LOPRESSOR) 25 mg tablet Take 1 tablet (25 mg total) by mouth 2 (two) times a day 60 tablet 5    potassium chloride (KLOR-CON M20) 20 mEq tablet Take 1 tablet (20 mEq total) by mouth daily for 180 days 30 tablet 5    warfarin (COUMADIN) 3 mg tablet Take by mouth daily       No current facility-administered medications for this visit  _______________________________________________________________________  Review of Systems   Constitutional: Negative for activity change, appetite change, fatigue and fever  HENT: Negative for congestion, ear pain, postnasal drip, rhinorrhea, sinus pressure, sinus pain, sneezing and sore throat  Eyes: Negative for pain and redness  Respiratory: Negative for apnea, cough, chest tightness, shortness of breath and wheezing  Cardiovascular: Negative for chest pain, palpitations and leg swelling  Gastrointestinal: Negative for abdominal pain, constipation, diarrhea, nausea and vomiting  Endocrine: Negative for cold intolerance and heat intolerance  Genitourinary: Negative for difficulty urinating, dysuria, frequency, hematuria and urgency  Musculoskeletal: Negative for arthralgias, back pain, gait problem and myalgias  Skin: Negative for rash  Neurological: Negative for dizziness, speech difficulty, weakness, numbness and headaches  Hematological: Does not bruise/bleed easily  Psychiatric/Behavioral: Positive for confusion  Negative for agitation and hallucinations  Objective: There were no vitals filed for this visit  There is no height or weight on file to calculate BMI  Physical Exam  Vitals and nursing note reviewed  Constitutional:       Appearance: She is well-developed  She is obese  HENT:      Head: Normocephalic and atraumatic  Nose: Nose normal       Mouth/Throat:      Mouth: Mucous membranes are moist    Eyes:      General: No scleral icterus  Conjunctiva/sclera: Conjunctivae normal       Pupils: Pupils are equal, round, and reactive to light  Neck:      Thyroid: No thyromegaly  Cardiovascular:      Rate and Rhythm: Normal rate and regular rhythm  Pulmonary:      Effort: Pulmonary effort is normal       Breath sounds: Normal breath sounds  No wheezing  Abdominal:      General: Bowel sounds are normal  There is no distension        Palpations: Abdomen is soft  Tenderness: There is no abdominal tenderness  There is no guarding or rebound  Musculoskeletal:         General: No tenderness or deformity  Normal range of motion  Cervical back: Normal range of motion and neck supple  Skin:     General: Skin is warm and dry  Findings: No erythema or rash  Neurological:      Mental Status: She is alert  She is disoriented  Sensory: No sensory deficit        Gait: Gait abnormal    Psychiatric:         Behavior: Behavior normal

## 2022-05-29 NOTE — PROGRESS NOTES
Assessment/Plan:         Problem List Items Addressed This Visit        Other    Fall - Primary      Other Visit Diagnoses     Gait abnormality        Will order PT/OT to maximize gait  Subjective:      Patient ID: Ingrid Jarrell is a 80 y o  female  This is an 59-year-old female seen examined status post fall  Patient has have some gait dysfunction and he some physical therapy as well as occupational therapy to maximize her gait and her risk of minimize her risk of falling  The following portions of the patient's history were reviewed and updated as appropriate:   Past Medical History:  She has a past medical history of Essential hypertension (4/11/2021), [facility] ) (4/11/2021), Mixed hyperlipidemia (4/11/2021), and Persistent atrial fibrillation ([facility] ) (4/11/2021)  ,  _______________________________________________________________________  Medical Problems:  does not have any pertinent problems on file ,  _______________________________________________________________________  Past Surgical History:   has a past surgical history that includes Hiatal hernia repair; Eye surgery; Renal mass excision (Right); Tonsillectomy; Cataract extraction, bilateral (Bilateral); Appendectomy; and TONSILECTOMY AND ADNOIDECTOMY ,  _______________________________________________________________________  Family History:  family history includes Dementia in her mother; Heart disease in her brother ,  _______________________________________________________________________  Social History:   reports that she has never smoked  She has never used smokeless tobacco  She reports previous alcohol use  She reports that she does not use drugs  ,  _______________________________________________________________________  Allergies:  has No Known Allergies     _______________________________________________________________________  Current Outpatient Medications   Medication Sig Dispense Refill    acetaminophen (TYLENOL) 325 mg tablet Take 3 tablets (975 mg total) by mouth every 8 (eight) hours  0    atorvastatin (LIPITOR) 20 mg tablet Take 1 tablet (20 mg total) by mouth daily 30 tablet 5    clotrimazole-betamethasone (LOTRISONE) 1-0 05 % cream Apply topically 2 (two) times a day 30 g 0    cyanocobalamin (VITAMIN B-12) 1,000 mcg tablet Take 1 tablet (1,000 mcg total) by mouth daily 30 tablet 5    donepezil (ARICEPT) 10 mg tablet Take 1 tablet (10 mg total) by mouth daily at bedtime 30 tablet 5    hydrochlorothiazide (HYDRODIURIL) 12 5 mg tablet Take 1 tablet (12 5 mg total) by mouth daily 30 tablet 11    lisinopril (ZESTRIL) 40 mg tablet Take 1 tablet (40 mg total) by mouth daily 30 tablet 11    metoprolol tartrate (LOPRESSOR) 25 mg tablet Take 1 tablet (25 mg total) by mouth 2 (two) times a day 60 tablet 5    potassium chloride (KLOR-CON M20) 20 mEq tablet Take 1 tablet (20 mEq total) by mouth daily for 180 days 30 tablet 5    warfarin (COUMADIN) 3 mg tablet Take by mouth daily       No current facility-administered medications for this visit      _______________________________________________________________________  Review of Systems   Constitutional: Negative for activity change, appetite change, fatigue and fever  HENT: Negative for congestion, ear pain, postnasal drip, rhinorrhea, sinus pressure, sinus pain, sneezing and sore throat  Eyes: Negative for pain and redness  Respiratory: Negative for apnea, cough, chest tightness, shortness of breath and wheezing  Cardiovascular: Negative for chest pain, palpitations and leg swelling  Gastrointestinal: Negative for abdominal pain, constipation, diarrhea, nausea and vomiting  Endocrine: Negative for cold intolerance and heat intolerance  Genitourinary: Negative for difficulty urinating, dysuria, frequency, hematuria and urgency  Musculoskeletal: Positive for gait problem   Negative for arthralgias, back pain and myalgias  Skin: Negative for rash  Neurological: Positive for weakness  Negative for dizziness, speech difficulty, numbness and headaches  Hematological: Does not bruise/bleed easily  Psychiatric/Behavioral: Negative for agitation, confusion and hallucinations  Objective: There were no vitals filed for this visit  There is no height or weight on file to calculate BMI  Physical Exam  Vitals and nursing note reviewed  Constitutional:       Appearance: She is well-developed  HENT:      Head: Normocephalic and atraumatic  Nose: Nose normal    Eyes:      General: No scleral icterus  Conjunctiva/sclera: Conjunctivae normal       Pupils: Pupils are equal, round, and reactive to light  Neck:      Thyroid: No thyromegaly  Pulmonary:      Effort: Pulmonary effort is normal       Breath sounds: Normal breath sounds  No wheezing  Abdominal:      General: Bowel sounds are normal  There is no distension  Palpations: Abdomen is soft  Tenderness: There is no abdominal tenderness  There is no guarding or rebound  Musculoskeletal:         General: No tenderness or deformity  Normal range of motion  Cervical back: Normal range of motion and neck supple  Skin:     General: Skin is warm and dry  Findings: No erythema or rash  Neurological:      Mental Status: She is alert  She is disoriented  Sensory: No sensory deficit  Motor: Weakness present        Gait: Gait abnormal    Psychiatric:         Behavior: Behavior normal

## 2022-06-08 DIAGNOSIS — M16.9 HIP OSTEOARTHRITIS: ICD-10-CM

## 2022-06-08 RX ORDER — ACETAMINOPHEN 325 MG/1
TABLET ORAL
Qty: 60 TABLET | Refills: 3 | Status: SHIPPED | OUTPATIENT
Start: 2022-06-08 | End: 2022-07-13

## 2022-06-16 ENCOUNTER — NURSING HOME VISIT (OUTPATIENT)
Dept: FAMILY MEDICINE CLINIC | Facility: CLINIC | Age: 87
End: 2022-06-16
Payer: MEDICARE

## 2022-06-16 DIAGNOSIS — M65.321 TRIGGER INDEX FINGER OF RIGHT HAND: ICD-10-CM

## 2022-06-16 DIAGNOSIS — R26.9 GAIT ABNORMALITY: Primary | ICD-10-CM

## 2022-06-16 PROCEDURE — 99336 PR DOM/R-HOME E/M EST PT MOD HI SEVERITY 40 MINUTES: CPT | Performed by: FAMILY MEDICINE

## 2022-06-20 NOTE — PROGRESS NOTES
Assessment/Plan:         Problem List Items Addressed This Visit    None     Visit Diagnoses     Gait abnormality    -  Primary    Relevant Orders    Ambulatory Referral to Physical Therapy    Ambulatory Referral to Occupational Therapy    Trigger index finger of right hand        Relevant Orders    Ambulatory Referral to Hand Surgery            Subjective:      Patient ID: Ariadne Hadley is a 80 y o  female  This is a 9year-old female seen examined a [facility] care facility  Patient has been having some problems with ambulating at  Patient has had no falls but she is having a lot of problems and difficulty with having her balance and walking with her walker  Patient has had physical therapy in the past was see above we consult in a [employer] Patient also complaining having a trigger finger in her right finger  Patient states has not given her problems now but she says at times it does lock up on her she has to open at 2 4 to work  The following portions of the patient's history were reviewed and updated as appropriate:   Past Medical History:  She has a past medical history of Essential hypertension (4/11/2021), Late onset Alzheimer's disease without behavioral disturbance ([facility] ) (4/11/2021), Mixed hyperlipidemia (4/11/2021), and Persistent atrial fibrillation ([facility] ) (4/11/2021)  ,  _______________________________________________________________________  Medical Problems:  does not have any pertinent problems on file ,  _______________________________________________________________________  Past Surgical History:   has a past surgical history that includes Hiatal hernia repair; Eye surgery; Renal mass excision (Right); Tonsillectomy; Cataract extraction, bilateral (Bilateral);  Appendectomy; and TONSILECTOMY AND ADNOIDECTOMY ,  _______________________________________________________________________  Family History:  family history includes Dementia in her mother; Heart disease in her brother ,  _______________________________________________________________________  Social History:   reports that she has never smoked  She has never used smokeless tobacco  She reports previous alcohol use  She reports that she does not use drugs  ,  _______________________________________________________________________  Allergies:  has No Known Allergies     _______________________________________________________________________  Current Outpatient Medications   Medication Sig Dispense Refill    acetaminophen (TYLENOL) 325 mg tablet TAKE ONE TABLET BY MOUTH EVERY FOUR HOURS AS NEEDED FOR PAIN / TEMP 60 tablet 3    atorvastatin (LIPITOR) 20 mg tablet Take 1 tablet (20 mg total) by mouth daily 30 tablet 5    clotrimazole-betamethasone (LOTRISONE) 1-0 05 % cream Apply topically 2 (two) times a day 30 g 0    cyanocobalamin (VITAMIN B-12) 1,000 mcg tablet Take 1 tablet (1,000 mcg total) by mouth daily 30 tablet 5    donepezil (ARICEPT) 10 mg tablet Take 1 tablet (10 mg total) by mouth daily at bedtime 30 tablet 5    hydrochlorothiazide (HYDRODIURIL) 12 5 mg tablet Take 1 tablet (12 5 mg total) by mouth daily 30 tablet 11    lisinopril (ZESTRIL) 40 mg tablet Take 1 tablet (40 mg total) by mouth daily 30 tablet 11    metoprolol tartrate (LOPRESSOR) 25 mg tablet Take 1 tablet (25 mg total) by mouth 2 (two) times a day 60 tablet 5    potassium chloride (KLOR-CON M20) 20 mEq tablet Take 1 tablet (20 mEq total) by mouth daily for 180 days 30 tablet 5    warfarin (COUMADIN) 3 mg tablet Take by mouth daily       No current facility-administered medications for this visit      _______________________________________________________________________  Review of Systems   Constitutional: Negative for activity change, appetite change, fatigue and fever  HENT: Negative for congestion, ear pain, postnasal drip, rhinorrhea, sinus pressure, sinus pain, sneezing and sore throat  Eyes: Negative for pain and redness  Respiratory: Negative for apnea, cough, chest tightness, shortness of breath and wheezing  Cardiovascular: Negative for chest pain, palpitations and leg swelling  Gastrointestinal: Negative for abdominal pain, constipation, diarrhea, nausea and vomiting  Endocrine: Negative for cold intolerance and heat intolerance  Genitourinary: Negative for difficulty urinating, dysuria, frequency, hematuria and urgency  Musculoskeletal: Positive for gait problem  Negative for arthralgias, back pain and myalgias  Skin: Negative for rash  Neurological: Negative for dizziness, speech difficulty, weakness, numbness and headaches  Hematological: Does not bruise/bleed easily  Psychiatric/Behavioral: Positive for confusion  Negative for agitation and hallucinations  Objective: There were no vitals filed for this visit  There is no height or weight on file to calculate BMI  Physical Exam  Vitals and nursing note reviewed  Constitutional:       Appearance: She is well-developed  She is obese  HENT:      Head: Normocephalic and atraumatic  Nose: Nose normal    Eyes:      General: No scleral icterus  Conjunctiva/sclera: Conjunctivae normal       Pupils: Pupils are equal, round, and reactive to light  Neck:      Thyroid: No thyromegaly  Pulmonary:      Effort: Pulmonary effort is normal       Breath sounds: Normal breath sounds  No wheezing  Abdominal:      General: Bowel sounds are normal  There is no distension  Palpations: Abdomen is soft  Tenderness: There is no abdominal tenderness  There is no guarding or rebound  Musculoskeletal:         General: No tenderness or deformity  Normal range of motion  Cervical back: Normal range of motion and neck supple  Skin:     General: Skin is warm and dry  Findings: No erythema or rash  Neurological:      Mental Status: She is alert  Mental status is at baseline  She is disoriented  Sensory: No sensory deficit  Motor: Weakness present        Gait: Gait abnormal    Psychiatric:         Behavior: Behavior normal

## 2022-06-22 DIAGNOSIS — R21 RASH: ICD-10-CM

## 2022-06-22 RX ORDER — CLOTRIMAZOLE AND BETAMETHASONE DIPROPIONATE 10; .64 MG/G; MG/G
CREAM TOPICAL
Qty: 60 G | Refills: 2 | Status: SHIPPED | OUTPATIENT
Start: 2022-06-22

## 2022-07-13 ENCOUNTER — HOSPITAL ENCOUNTER (EMERGENCY)
Facility: HOSPITAL | Age: 87
Discharge: HOME | End: 2022-07-13
Attending: EMERGENCY MEDICINE
Payer: MEDICARE

## 2022-07-13 VITALS
SYSTOLIC BLOOD PRESSURE: 141 MMHG | OXYGEN SATURATION: 97 % | DIASTOLIC BLOOD PRESSURE: 83 MMHG | RESPIRATION RATE: 18 BRPM | HEART RATE: 75 BPM | TEMPERATURE: 97.8 F

## 2022-07-13 DIAGNOSIS — Z00.00 NORMAL EXAM: Primary | ICD-10-CM

## 2022-07-13 LAB
ALBUMIN SERPL BCP-MCNC: 3.8 G/DL (ref 3.5–5)
ALP SERPL-CCNC: 78 U/L (ref 34–104)
ALT SERPL W P-5'-P-CCNC: 15 U/L (ref 7–52)
ANION GAP SERPL CALCULATED.3IONS-SCNC: 3 MMOL/L (ref 4–13)
APTT PPP: 33 SECONDS (ref 23–37)
AST SERPL W P-5'-P-CCNC: 20 U/L (ref 13–39)
BASOPHILS # BLD AUTO: 0.08 THOUSANDS/ΜL (ref 0–0.1)
BASOPHILS NFR BLD AUTO: 1 % (ref 0–1)
BILIRUB SERPL-MCNC: 0.71 MG/DL (ref 0.2–1)
BILIRUB UR QL STRIP: NEGATIVE
BUN SERPL-MCNC: 17 MG/DL (ref 5–25)
CALCIUM SERPL-MCNC: 10.4 MG/DL (ref 8.4–10.2)
CHLORIDE SERPL-SCNC: 106 MMOL/L (ref 96–108)
CLARITY UR: CLEAR
CO2 SERPL-SCNC: 32 MMOL/L (ref 21–32)
COLOR UR: COLORLESS
CREAT SERPL-MCNC: 0.75 MG/DL (ref 0.6–1.3)
EOSINOPHIL # BLD AUTO: 0.44 THOUSAND/ΜL (ref 0–0.61)
EOSINOPHIL NFR BLD AUTO: 4 % (ref 0–6)
ERYTHROCYTE [DISTWIDTH] IN BLOOD BY AUTOMATED COUNT: 14.6 % (ref 11.6–15.1)
GFR SERPL CREATININE-BSD FRML MDRD: 71 ML/MIN/1.73SQ M
GLUCOSE SERPL-MCNC: 120 MG/DL (ref 65–140)
GLUCOSE UR STRIP-MCNC: NEGATIVE MG/DL
HCT VFR BLD AUTO: 45.1 % (ref 34.8–46.1)
HGB BLD-MCNC: 14.3 G/DL (ref 11.5–15.4)
HGB UR QL STRIP.AUTO: NEGATIVE
IMM GRANULOCYTES # BLD AUTO: 0.03 THOUSAND/UL (ref 0–0.2)
IMM GRANULOCYTES NFR BLD AUTO: 0 % (ref 0–2)
INR PPP: 1.72 (ref 0.84–1.19)
KETONES UR STRIP-MCNC: NEGATIVE MG/DL
LEUKOCYTE ESTERASE UR QL STRIP: NEGATIVE
LYMPHOCYTES # BLD AUTO: 2.73 THOUSANDS/ΜL (ref 0.6–4.47)
LYMPHOCYTES NFR BLD AUTO: 26 % (ref 14–44)
MCH RBC QN AUTO: 29.5 PG (ref 26.8–34.3)
MCHC RBC AUTO-ENTMCNC: 31.7 G/DL (ref 31.4–37.4)
MCV RBC AUTO: 93 FL (ref 82–98)
MONOCYTES # BLD AUTO: 1.02 THOUSAND/ΜL (ref 0.17–1.22)
MONOCYTES NFR BLD AUTO: 10 % (ref 4–12)
NEUTROPHILS # BLD AUTO: 6.08 THOUSANDS/ΜL (ref 1.85–7.62)
NEUTS SEG NFR BLD AUTO: 59 % (ref 43–75)
NITRITE UR QL STRIP: NEGATIVE
NRBC BLD AUTO-RTO: 0 /100 WBCS
PH UR STRIP.AUTO: 8 [PH]
PLATELET # BLD AUTO: 234 THOUSANDS/UL (ref 149–390)
PMV BLD AUTO: 11.6 FL (ref 8.9–12.7)
POTASSIUM SERPL-SCNC: 4.8 MMOL/L (ref 3.5–5.3)
PROT SERPL-MCNC: 7 G/DL (ref 6.4–8.4)
PROT UR STRIP-MCNC: NEGATIVE MG/DL
PROTHROMBIN TIME: 20 SECONDS (ref 11.6–14.5)
RBC # BLD AUTO: 4.84 MILLION/UL (ref 3.81–5.12)
SODIUM SERPL-SCNC: 141 MMOL/L (ref 135–147)
SP GR UR STRIP.AUTO: 1.01 (ref 1–1.03)
UROBILINOGEN UR STRIP-ACNC: <2 MG/DL
WBC # BLD AUTO: 10.38 THOUSAND/UL (ref 4.31–10.16)

## 2022-07-13 PROCEDURE — 85025 COMPLETE CBC W/AUTO DIFF WBC: CPT

## 2022-07-13 PROCEDURE — 85610 PROTHROMBIN TIME: CPT

## 2022-07-13 PROCEDURE — 80053 COMPREHEN METABOLIC PANEL: CPT

## 2022-07-13 PROCEDURE — 99284 EMERGENCY DEPT VISIT MOD MDM: CPT

## 2022-07-13 PROCEDURE — 81003 URINALYSIS AUTO W/O SCOPE: CPT

## 2022-07-13 PROCEDURE — 36415 COLL VENOUS BLD VENIPUNCTURE: CPT

## 2022-07-13 PROCEDURE — 85730 THROMBOPLASTIN TIME PARTIAL: CPT

## 2022-07-13 PROCEDURE — 82272 OCCULT BLD FECES 1-3 TESTS: CPT

## 2022-07-13 PROCEDURE — 99282 EMERGENCY DEPT VISIT SF MDM: CPT | Performed by: EMERGENCY MEDICINE

## 2022-07-13 NOTE — ED PROVIDER NOTES
History  Chief Complaint   Patient presents with    Possible UTI     Found blood in brief from facility staff  Staff thinks she may have a UTI  Patient has baseline dementia and is a poor historian  Devon Damian is a 80year old female with a history of dementia presenting from her skilled nursing facility for evaluation after her nurse found blood in her underwear, they suspect possible urinary tract infection  The patient here has no complaints, she denies any abdominal pain, she denies dysuria, she denies any changes in bowel movements, though admittedly it is unclear whether she is a reliable historian  Patient appears to take Coumadin daily  History provided by:  Patient and nursing home   used: No        Prior to Admission Medications   Prescriptions Last Dose Informant Patient Reported?  Taking?   acetaminophen (TYLENOL) 325 mg tablet   No No   Sig: TAKE ONE TABLET BY MOUTH EVERY FOUR HOURS AS NEEDED FOR PAIN / TEMP   atorvastatin (LIPITOR) 20 mg tablet   No No   Sig: Take 1 tablet (20 mg total) by mouth daily   clotrimazole-betamethasone (LOTRISONE) 1-0 05 % cream   No No   Sig: APPLY TO RASH TWICE DAILY AS NEEDED DX: RASH   cyanocobalamin (VITAMIN B-12) 1,000 mcg tablet   No No   Sig: Take 1 tablet (1,000 mcg total) by mouth daily   donepezil (ARICEPT) 10 mg tablet   No No   Sig: Take 1 tablet (10 mg total) by mouth daily at bedtime   hydrochlorothiazide (HYDRODIURIL) 12 5 mg tablet   No No   Sig: Take 1 tablet (12 5 mg total) by mouth daily   lisinopril (ZESTRIL) 40 mg tablet   No No   Sig: Take 1 tablet (40 mg total) by mouth daily   metoprolol tartrate (LOPRESSOR) 25 mg tablet   No No   Sig: Take 1 tablet (25 mg total) by mouth 2 (two) times a day   potassium chloride (KLOR-CON M20) 20 mEq tablet   No No   Sig: Take 1 tablet (20 mEq total) by mouth daily for 180 days   warfarin (COUMADIN) 3 mg tablet   Yes No   Sig: Take by mouth daily      Facility-Administered Medications: None Past Medical History:   Diagnosis Date    Essential hypertension 4/11/2021    [facility] onset [facility] ) 4/11/2021    Mixed hyperlipidemia 4/11/2021    [facility] ) 4/11/2021       Past Surgical History:   Procedure Laterality Date    APPENDECTOMY      CATARACT EXTRACTION, BILATERAL Bilateral     EYE SURGERY      Cataract    HIATAL HERNIA REPAIR      Anterior Gastropexy    RENAL MASS EXCISION Right     Cryosurgical Ablation    TONSILECTOMY AND ADNOIDECTOMY      TONSILLECTOMY         Family History   Problem Relation Age of Onset    Dementia Mother     Heart disease Brother         Arteriosclerotic Cardiovascular     I have reviewed and agree with the history as documented  E-Cigarette/Vaping     E-Cigarette/Vaping Substances     Social History     Tobacco Use    Smoking status: Never Smoker    Smokeless tobacco: Never Used   Substance Use Topics    Alcohol use: Not Currently    Drug use: No        Review of Systems   Constitutional: Negative for chills and fever  HENT: Negative for ear pain and sore throat  Eyes: Negative for pain and visual disturbance  Respiratory: Negative for cough and shortness of breath  Cardiovascular: Negative for chest pain and palpitations  Gastrointestinal: Negative for abdominal pain, diarrhea, nausea and vomiting  Blood found in underwear   Genitourinary: Negative for dysuria and hematuria  Musculoskeletal: Negative for arthralgias and back pain  Skin: Negative for color change and rash  Neurological: Negative for seizures and syncope  All other systems reviewed and are negative        Physical Exam  ED Triage Vitals   Temperature Pulse Respirations Blood Pressure SpO2   07/13/22 1021 07/13/22 1017 07/13/22 1017 07/13/22 1017 07/13/22 1017   97 8 °F (36 6 °C) 75 18 141/83 97 %      Temp Source Heart Rate Source Patient Position - Orthostatic VS BP Location FiO2 (%) 07/13/22 1021 07/13/22 1017 07/13/22 1017 07/13/22 1017 --   Oral Monitor Sitting Right arm       Pain Score       --                    Orthostatic Vital Signs  Vitals:    07/13/22 1017   BP: 141/83   Pulse: 75   Patient Position - Orthostatic VS: Sitting       Physical Exam  Vitals and nursing note reviewed  Constitutional:       General: She is not in acute distress  HENT:      Head: Normocephalic and atraumatic  Mouth/Throat:      Mouth: Mucous membranes are moist       Pharynx: Oropharynx is clear  Eyes:      General: No scleral icterus  Conjunctiva/sclera: Conjunctivae normal    Cardiovascular:      Rate and Rhythm: Normal rate and regular rhythm  Heart sounds: Normal heart sounds  Pulmonary:      Effort: Pulmonary effort is normal  No respiratory distress  Breath sounds: Normal breath sounds  No wheezing, rhonchi or rales  Abdominal:      General: Abdomen is flat  There is no distension  Tenderness: There is no abdominal tenderness  There is no guarding or rebound  Genitourinary:     Comments: Rectal exam did not show any gross blood or dark colored stool, hemoccult test was negative  Patient is a no genital region was erythematous, suspect this to be chronic in nature from her mostly sitting on her bottom in a wet diaper, she was not acutely tender in this area, I did not appreciate any open wounds, did not appreciate any crepitus  I did not see any gross blood around the vagina or urethra  Musculoskeletal:         General: No tenderness or signs of injury  Cervical back: Neck supple  No rigidity  Skin:     General: Skin is warm  Coloration: Skin is not jaundiced  Findings: No erythema or rash  Neurological:      General: No focal deficit present  Mental Status: She is alert  Mental status is at baseline     Psychiatric:         Mood and Affect: Mood normal          Behavior: Behavior normal          ED Medications  Medications - No data to display    Diagnostic Studies  Results Reviewed     Procedure Component Value Units Date/Time    UA w Reflex to Microscopic w Reflex to Culture [[id number]] Collected: 07/13/22 1128    Lab Status: Final result Specimen: Urine, Clean Catch Updated: 07/13/22 1200     Color, UA Colorless     Clarity, UA Clear     Specific Gravity, UA 1 008     pH, UA 8 0     Leukocytes, UA Negative     Nitrite, UA Negative     Protein, UA Negative mg/dl      Glucose, UA Negative mg/dl      Ketones, UA Negative mg/dl      Urobilinogen, UA <2 0 mg/dl      Bilirubin, UA Negative     Occult Blood, UA Negative    Comprehensive metabolic panel [[id number]]  (Abnormal) Collected: 07/13/22 1040    Lab Status: Final result Specimen: Blood from Arm, Left Updated: 07/13/22 1127     Sodium 141 mmol/L      Potassium 4 8 mmol/L      Chloride 106 mmol/L      CO2 32 mmol/L      ANION GAP 3 mmol/L      BUN 17 mg/dL      Creatinine 0 75 mg/dL      Glucose 120 mg/dL      Calcium 10 4 mg/dL      AST 20 U/L      ALT 15 U/L      Alkaline Phosphatase 78 U/L      Total Protein 7 0 g/dL      Albumin 3 8 g/dL      Total Bilirubin 0 71 mg/dL      eGFR 71 ml/min/1 73sq m     Narrative:      [place] guidelines for Chronic Kidney Disease (CKD):     Stage 1 with normal or high GFR (GFR > 90 mL/min/1 73 square meters)    Stage 2 Mild CKD (GFR = 60-89 mL/min/1 73 square meters)    Stage 3A Moderate CKD (GFR = 45-59 mL/min/1 73 square meters)    Stage 3B Moderate CKD (GFR = 30-44 mL/min/1 73 square meters)    Stage 4 Severe CKD (GFR = 15-29 mL/min/1 73 square meters)    Stage 5 End Stage CKD (GFR <15 mL/min/1 73 square meters)  Note: GFR calculation is accurate only with a steady state creatinine    Protime-INR [[id number]]  (Abnormal) Collected: 07/13/22 1040    Lab Status: Final result Specimen: Blood from Arm, Left Updated: 07/13/22 1103     Protime 20 0 seconds      INR 1 72    APTT [[id number]]  (Normal) Collected: 07/13/22 1040    Lab Status: Final result Specimen: Blood from Arm, Left Updated: 07/13/22 1103     PTT 33 seconds     CBC and differential [[id number]]  (Abnormal) Collected: 07/13/22 1040    Lab Status: Final result Specimen: Blood from Arm, Left Updated: 07/13/22 1051     WBC 10 38 Thousand/uL      RBC 4 84 Million/uL      Hemoglobin 14 3 g/dL      Hematocrit 45 1 %      MCV 93 fL      MCH 29 5 pg      MCHC 31 7 g/dL      RDW 14 6 %      MPV 11 6 fL      Platelets 273 Thousands/uL      nRBC 0 /100 WBCs      Neutrophils Relative 59 %      Immat GRANS % 0 %      Lymphocytes Relative 26 %      Monocytes Relative 10 %      Eosinophils Relative 4 %      Basophils Relative 1 %      Neutrophils Absolute 6 08 Thousands/µL      Immature Grans Absolute 0 03 Thousand/uL      Lymphocytes Absolute 2 73 Thousands/µL      Monocytes Absolute 1 02 Thousand/µL      Eosinophils Absolute 0 44 Thousand/µL      Basophils Absolute 0 08 Thousands/µL                  No orders to display         Procedures  Procedures      ED Course                             SBIRT 20yo+    Flowsheet Row Most Recent Value   SBIRT (25 yo +)    In order to provide better care to our patients, we are screening all of our patients for alcohol and drug use  Would it be okay to ask you these screening questions? Unable to answer at this time Filed at: 07/13/2022 1019                MDM  Number of Diagnoses or Management Options  Normal exam: new and requires workup  Risk of Complications, Morbidity, and/or Mortality  General comments: Mirella Moon is a 80year old female with a history of dementia presenting from her skilled nursing facility for evaluation after her nurse found blood in her underwear, they suspect possible urinary tract infection  Patient's vital signs were unremarkable, she is afebrile    Patient had erythema of her ano-genital region, did not appreciate crepitus, I did not appreciate any open or bleeding wounds, the patient was not acutely tender in this area, it appears to be chronic in nature most likely from prolonged sitting in 1 position  Hemoccult test was negative  I did not appreciate any gross blood in her ano-genital region  Hemoglobin was stable at greater than 14  UA was negative for acute infection or hematuria  Patient stable for discharge  Patient Progress  Patient progress: stable      Disposition  Final diagnoses:   Normal exam     Time reflects when diagnosis was documented in both MDM as applicable and the Disposition within this note     Time User Action Codes Description Comment    7/13/2022 12:07 PM Quan Silva Add [Z00 00] Normal exam       ED Disposition     ED Disposition   Discharge    Condition   Stable    Date/Time   Wed Jul 13, 2022 12:04 PM    Comment   Keily Kaur discharge to home/self care  Follow-up Information     Follow up With Specialties Details Why Contact Info    Tal Wise MD Internal Medicine, Family Medicine Schedule an appointment as soon as possible for a visit   [address] [place] [address]  [phone]            Patient's Medications   Discharge Prescriptions    No medications on file     No discharge procedures on file  PDMP Review       Value Time User    PDMP Reviewed  Yes 3/20/2021 10:10 AM [facility]           ED Provider  Attending physically available and evaluated Keily Kaur  I managed the patient along with the ED Attending      Electronically Signed by         Evon Trent DO  07/13/22 1212

## 2022-07-13 NOTE — DISCHARGE INSTRUCTIONS
Consider calling and scheduling a follow-up appointment with your family doctor  Your bottom is red and appears sore, I would take care to not sit on her bottom for prolonged periods of time and to shift her weight into different positions

## 2022-07-13 NOTE — ED NOTES
TRANSPORT INFORMATION   by [employer] at 1500    Primary RN made aware via tiger text     April Inge Leonard RN  07/13/22 1300

## 2022-07-14 ENCOUNTER — NURSING HOME VISIT (OUTPATIENT)
Dept: FAMILY MEDICINE CLINIC | Facility: CLINIC | Age: 87
End: 2022-07-14
Payer: MEDICARE

## 2022-07-14 DIAGNOSIS — L82.0 SEBORRHEIC KERATOSES, INFLAMED: Primary | ICD-10-CM

## 2022-07-14 PROCEDURE — 99335 PR DOM/R-HOME E/M EST PT LW MOD SEVERITY 25 MINUTES: CPT | Performed by: FAMILY MEDICINE

## 2022-07-24 NOTE — ED ATTENDING ATTESTATION
7/13/2022  IMichelle MD, saw and evaluated the patient  I have discussed the patient with the resident/non-physician practitioner and agree with the resident's/non-physician practitioner's findings, Plan of Care, and MDM as documented in the resident's/non-physician practitioner's note, except where noted  All available labs and Radiology studies were reviewed  I was present for key portions of any procedure(s) performed by the resident/non-physician practitioner and I was immediately available to provide assistance  At this point I agree with the current assessment done in the Emergency Department    I have conducted an independent evaluation of this patient a history and physical is as follows:see h and p above- agree with er resident tx plan/ dispo    ED Course         Critical Care Time  Procedures
no precautions noted

## 2022-08-23 DIAGNOSIS — R21 RASH: ICD-10-CM

## 2022-08-24 RX ORDER — CLOTRIMAZOLE AND BETAMETHASONE DIPROPIONATE 10; .64 MG/G; MG/G
CREAM TOPICAL
Qty: 60 G | Refills: 2 | Status: SHIPPED | OUTPATIENT
Start: 2022-08-24 | End: 2022-10-19

## 2022-08-31 NOTE — PROGRESS NOTES
Assessment/Plan:         Problem List Items Addressed This Visit    None     Visit Diagnoses     Seborrheic keratoses, inflamed    -  Primary    To observe for now  To consider surgery for event reviewed removal if this still becomes irritated and she keeps picking at  Subjective:      Patient ID: Jana Carey is a 80 y o  female  This 49-year-old female seen examined in assisted-living facility  Patient has a lesion on her left lateral deltoid region  Patient has been picking at it and appears to be inflamed but not infected  It appears though she has a seborrheic keratosis inflamed  Patient states she will stop taking added and we will observe at this point  The to consider surgery removal if it keeps bothering her  The following portions of the patient's history were reviewed and updated as appropriate:   Past Medical History:  She has a past medical history of Essential hypertension (4/11/2021), Late onset [facility] without [facility] ) (4/11/2021), Mixed hyperlipidemia (4/11/2021), and Persistent atrial fibrillation ([facility] ) (4/11/2021)  ,  _______________________________________________________________________  Medical Problems:  does not have any pertinent problems on file ,  _______________________________________________________________________  Past Surgical History:   has a past surgical history that includes Hiatal hernia repair; Eye surgery; Renal mass excision (Right); Tonsillectomy; Cataract extraction, bilateral (Bilateral); Appendectomy; and TONSILECTOMY AND ADNOIDECTOMY ,  _______________________________________________________________________  Family History:  family history includes Dementia in her mother; Heart disease in her brother ,  _______________________________________________________________________  Social History:   reports that she has never smoked  She has never used smokeless tobacco  She reports previous alcohol use   She reports that she does not use drugs  ,  _______________________________________________________________________  Allergies:  has No Known Allergies     _______________________________________________________________________  Current Outpatient Medications   Medication Sig Dispense Refill    acetaminophen (TYLENOL) 325 mg tablet TAKE ONE TABLET BY MOUTH EVERY FOUR HOURS AS NEEDED FOR PAIN / TEMP 60 tablet 3    atorvastatin (LIPITOR) 20 mg tablet Take 1 tablet (20 mg total) by mouth daily 30 tablet 5    clotrimazole-betamethasone (LOTRISONE) 1-0 05 % cream APPLY TO RASH TWICE DAILY AS NEEDED DX: RASH 60 g 2    cyanocobalamin (VITAMIN B-12) 1,000 mcg tablet Take 1 tablet (1,000 mcg total) by mouth daily 30 tablet 5    donepezil (ARICEPT) 10 mg tablet Take 1 tablet (10 mg total) by mouth daily at bedtime 30 tablet 5    hydrochlorothiazide (HYDRODIURIL) 12 5 mg tablet Take 1 tablet (12 5 mg total) by mouth daily 30 tablet 11    lisinopril (ZESTRIL) 40 mg tablet Take 1 tablet (40 mg total) by mouth daily 30 tablet 11    metoprolol tartrate (LOPRESSOR) 25 mg tablet Take 1 tablet (25 mg total) by mouth 2 (two) times a day 60 tablet 5    potassium chloride (KLOR-CON M20) 20 mEq tablet Take 1 tablet (20 mEq total) by mouth daily for 180 days 30 tablet 5    warfarin (COUMADIN) 3 mg tablet Take by mouth daily       No current facility-administered medications for this visit      _______________________________________________________________________  Review of Systems   Constitutional: Negative for activity change, appetite change, fatigue and fever  HENT: Negative for congestion, ear pain, postnasal drip, rhinorrhea, sinus pressure, sinus pain, sneezing and sore throat  Eyes: Negative for pain and redness  Respiratory: Negative for apnea, cough, chest tightness, shortness of breath and wheezing  Cardiovascular: Negative for chest pain, palpitations and leg swelling     Gastrointestinal: Negative for abdominal pain, constipation, diarrhea, nausea and vomiting  Endocrine: Negative for cold intolerance and heat intolerance  Genitourinary: Negative for difficulty urinating, dysuria, frequency, hematuria and urgency  Musculoskeletal: Negative for arthralgias, back pain, gait problem and myalgias  Skin: Negative for rash  Neurological: Negative for dizziness, speech difficulty, weakness, numbness and headaches  Hematological: Does not bruise/bleed easily  Psychiatric/Behavioral: Positive for confusion  Negative for agitation and hallucinations  Objective: There were no vitals filed for this visit  There is no height or weight on file to calculate BMI  Physical Exam  Skin:     Findings: Lesion present

## 2022-09-06 ENCOUNTER — APPOINTMENT (OUTPATIENT)
Dept: RADIOLOGY | Facility: HOSPITAL | Age: 87
End: 2022-09-06
Payer: MEDICARE

## 2022-09-06 ENCOUNTER — HOSPITAL ENCOUNTER (EMERGENCY)
Facility: HOSPITAL | Age: 87
Discharge: HOME | End: 2022-09-06
Attending: EMERGENCY MEDICINE
Payer: MEDICARE

## 2022-09-06 VITALS
BODY MASS INDEX: 34.72 KG/M2 | TEMPERATURE: 98.7 F | WEIGHT: 205.47 LBS | HEART RATE: 97 BPM | RESPIRATION RATE: 16 BRPM | SYSTOLIC BLOOD PRESSURE: 176 MMHG | OXYGEN SATURATION: 94 % | DIASTOLIC BLOOD PRESSURE: 98 MMHG

## 2022-09-06 DIAGNOSIS — L03.012 PARONYCHIA OF THUMB, LEFT: Primary | ICD-10-CM

## 2022-09-06 LAB
ALBUMIN SERPL BCP-MCNC: 3.9 G/DL (ref 3.5–5)
ALP SERPL-CCNC: 94 U/L (ref 34–104)
ALT SERPL W P-5'-P-CCNC: 17 U/L (ref 7–52)
ANION GAP SERPL CALCULATED.3IONS-SCNC: 9 MMOL/L (ref 4–13)
APTT PPP: 38 SECONDS (ref 23–37)
AST SERPL W P-5'-P-CCNC: 20 U/L (ref 13–39)
BASOPHILS # BLD AUTO: 0.07 THOUSANDS/ΜL (ref 0–0.1)
BASOPHILS NFR BLD AUTO: 1 % (ref 0–1)
BILIRUB SERPL-MCNC: 1.13 MG/DL (ref 0.2–1)
BILIRUB UR QL STRIP: NEGATIVE
BUN SERPL-MCNC: 15 MG/DL (ref 5–25)
CALCIUM SERPL-MCNC: 10.3 MG/DL (ref 8.4–10.2)
CARDIAC TROPONIN I PNL SERPL HS: 4 NG/L
CHLORIDE SERPL-SCNC: 101 MMOL/L (ref 96–108)
CLARITY UR: CLEAR
CO2 SERPL-SCNC: 26 MMOL/L (ref 21–32)
COLOR UR: NORMAL
CREAT SERPL-MCNC: 0.64 MG/DL (ref 0.6–1.3)
EOSINOPHIL # BLD AUTO: 0.04 THOUSAND/ΜL (ref 0–0.61)
EOSINOPHIL NFR BLD AUTO: 0 % (ref 0–6)
ERYTHROCYTE [DISTWIDTH] IN BLOOD BY AUTOMATED COUNT: 15.6 % (ref 11.6–15.1)
GFR SERPL CREATININE-BSD FRML MDRD: 80 ML/MIN/1.73SQ M
GLUCOSE SERPL-MCNC: 124 MG/DL (ref 65–140)
GLUCOSE UR STRIP-MCNC: NEGATIVE MG/DL
HCT VFR BLD AUTO: 44.2 % (ref 34.8–46.1)
HGB BLD-MCNC: 14.8 G/DL (ref 11.5–15.4)
HGB UR QL STRIP.AUTO: NEGATIVE
IMM GRANULOCYTES # BLD AUTO: 0.04 THOUSAND/UL (ref 0–0.2)
IMM GRANULOCYTES NFR BLD AUTO: 0 % (ref 0–2)
INR PPP: 2.08 (ref 0.84–1.19)
KETONES UR STRIP-MCNC: NEGATIVE MG/DL
LEUKOCYTE ESTERASE UR QL STRIP: NEGATIVE
LYMPHOCYTES # BLD AUTO: 1.73 THOUSANDS/ΜL (ref 0.6–4.47)
LYMPHOCYTES NFR BLD AUTO: 16 % (ref 14–44)
MCH RBC QN AUTO: 29.1 PG (ref 26.8–34.3)
MCHC RBC AUTO-ENTMCNC: 33.5 G/DL (ref 31.4–37.4)
MCV RBC AUTO: 87 FL (ref 82–98)
MONOCYTES # BLD AUTO: 0.62 THOUSAND/ΜL (ref 0.17–1.22)
MONOCYTES NFR BLD AUTO: 6 % (ref 4–12)
NEUTROPHILS # BLD AUTO: 8.09 THOUSANDS/ΜL (ref 1.85–7.62)
NEUTS SEG NFR BLD AUTO: 77 % (ref 43–75)
NITRITE UR QL STRIP: NEGATIVE
NRBC BLD AUTO-RTO: 0 /100 WBCS
PH UR STRIP.AUTO: 6 [PH]
PLATELET # BLD AUTO: 244 THOUSANDS/UL (ref 149–390)
PMV BLD AUTO: 12 FL (ref 8.9–12.7)
POTASSIUM SERPL-SCNC: 4.2 MMOL/L (ref 3.5–5.3)
PROT SERPL-MCNC: 7.4 G/DL (ref 6.4–8.4)
PROT UR STRIP-MCNC: NEGATIVE MG/DL
PROTHROMBIN TIME: 23.6 SECONDS (ref 11.6–14.5)
RBC # BLD AUTO: 5.09 MILLION/UL (ref 3.81–5.12)
SODIUM SERPL-SCNC: 136 MMOL/L (ref 135–147)
SP GR UR STRIP.AUTO: 1.02 (ref 1–1.03)
UROBILINOGEN UR STRIP-ACNC: <2 MG/DL
WBC # BLD AUTO: 10.59 THOUSAND/UL (ref 4.31–10.16)

## 2022-09-06 PROCEDURE — 26010 DRAINAGE OF FINGER ABSCESS: CPT | Performed by: EMERGENCY MEDICINE

## 2022-09-06 PROCEDURE — 84484 ASSAY OF TROPONIN QUANT: CPT

## 2022-09-06 PROCEDURE — 81003 URINALYSIS AUTO W/O SCOPE: CPT

## 2022-09-06 PROCEDURE — 80053 COMPREHEN METABOLIC PANEL: CPT

## 2022-09-06 PROCEDURE — 85025 COMPLETE CBC W/AUTO DIFF WBC: CPT

## 2022-09-06 PROCEDURE — 73140 X-RAY EXAM OF FINGER(S): CPT

## 2022-09-06 PROCEDURE — 36415 COLL VENOUS BLD VENIPUNCTURE: CPT

## 2022-09-06 PROCEDURE — 99285 EMERGENCY DEPT VISIT HI MDM: CPT | Performed by: EMERGENCY MEDICINE

## 2022-09-06 PROCEDURE — 93005 ELECTROCARDIOGRAM TRACING: CPT

## 2022-09-06 PROCEDURE — 99284 EMERGENCY DEPT VISIT MOD MDM: CPT

## 2022-09-06 PROCEDURE — 85730 THROMBOPLASTIN TIME PARTIAL: CPT

## 2022-09-06 PROCEDURE — 71045 X-RAY EXAM CHEST 1 VIEW: CPT

## 2022-09-06 PROCEDURE — 85610 PROTHROMBIN TIME: CPT

## 2022-09-06 RX ORDER — AMLODIPINE BESYLATE 5 MG/1
TABLET ORAL
COMMUNITY
Start: 2022-06-29

## 2022-09-06 RX ORDER — DOXYCYCLINE HYCLATE 100 MG/1
100 CAPSULE ORAL ONCE
Status: COMPLETED | OUTPATIENT
Start: 2022-09-06 | End: 2022-09-06

## 2022-09-06 RX ORDER — DOXYCYCLINE HYCLATE 100 MG/1
100 CAPSULE ORAL 2 TIMES DAILY
Qty: 14 CAPSULE | Refills: 0 | Status: SHIPPED | OUTPATIENT
Start: 2022-09-06 | End: 2022-09-13

## 2022-09-06 RX ORDER — GINSENG 100 MG
1 CAPSULE ORAL ONCE
Status: DISCONTINUED | OUTPATIENT
Start: 2022-09-06 | End: 2022-09-06 | Stop reason: HOSPADM

## 2022-09-06 RX ADMIN — DOXYCYCLINE 100 MG: 100 CAPSULE ORAL at 13:25

## 2022-09-06 NOTE — ED NOTES
Transport by [facility] scheduled for   Will make primary RN aware     Venus Alex RN  09/06/22 2801

## 2022-09-06 NOTE — DISCHARGE INSTRUCTIONS
Please take the antibiotic as directed  Please decrease Warfarin dosing to every other day while taking the antibiotic  Please follow-up with Orthopedics as soon as possible for further evaluation of L thumb  Please call and follow-up with Primary care physician for post-ER visit evaluation   Please return to the emergency department for any worsening symptoms, including but not limited to:  Fever, nausea, vomiting, altered mental status, worsening redness to the left thumb, increased bleeding

## 2022-09-06 NOTE — ED PROVIDER NOTES
History  Chief Complaint   Patient presents with    Vaginal Bleeding     Per ems staff stated pt has vaginal bleeding and possible UTI      49-year-old female with PMHx of HTN, a-fib, dementia, HLD, who presents to ED with a c/o, per facility and EMS, vaginal bleeding  EMS reports on their arrival, they woke pt up and she had a syncopal episode  EMS also noted that she has area of swelling and discoloration to L thumb  EMS reports pt told them she burnt it on the stove  When questioned by me, she reports she "smashed it in the fridge " She reports this area is not painful  Pt denies any complaints at this time, including: fever, CP, SOB, abdominal pain, NV, diarrhea, constipation, vaginal bleeding, hematochezia, headache  She reports she does not know why she is here  History provided by:  Patient, EMS personnel and nursing home  History limited by:  Dementia      Prior to Admission Medications   Prescriptions Last Dose Informant Patient Reported? Taking?    POTASSIUM CHLORIDE ER PO   Yes Yes   Sig: Take 20 mEq by mouth daily   acetaminophen (TYLENOL) 325 mg tablet   No Yes   Sig: TAKE ONE TABLET BY MOUTH EVERY FOUR HOURS AS NEEDED FOR PAIN / TEMP   amLODIPine (NORVASC) 5 mg tablet   Yes Yes   Sig: TAKE ONE TABLET BY MOUTH DAILY IN THE MORNING DX  HTN   atorvastatin (LIPITOR) 20 mg tablet   No Yes   Sig: Take 1 tablet (20 mg total) by mouth daily   clotrimazole-betamethasone (LOTRISONE) 1-0 05 % cream   No Yes   Sig: APPLY TO RASH TWICE DAILY AS NEEDED DX: RASH   cyanocobalamin (VITAMIN B-12) 1,000 mcg tablet   No Yes   Sig: Take 1 tablet (1,000 mcg total) by mouth daily   donepezil (ARICEPT) 10 mg tablet   No Yes   Sig: Take 1 tablet (10 mg total) by mouth daily at bedtime   hydrochlorothiazide (HYDRODIURIL) 12 5 mg tablet   No Yes   Sig: Take 1 tablet (12 5 mg total) by mouth daily   lisinopril (ZESTRIL) 40 mg tablet   No Yes   Sig: Take 1 tablet (40 mg total) by mouth daily   metoprolol tartrate (LOPRESSOR) 25 mg tablet   No Yes   Sig: Take 1 tablet (25 mg total) by mouth 2 (two) times a day   warfarin (COUMADIN) 3 mg tablet   Yes Yes   Sig: Take by mouth daily      Facility-Administered Medications: None       Past Medical History:   Diagnosis Date    Essential hypertension 4/11/2021    Late onset Alzheimer's disease without behavioral disturbance ([facility] [address] ) 4/11/2021    Mixed hyperlipidemia 4/11/2021    Pers[facility] fibrillation [facility] ) 4/11/2021       Past Surgical History:   Procedure Laterality Date    APPENDECTOMY      CATARACT EXTRACTION, BILATERAL Bilateral     EYE SURGERY      Cataract    HIATAL HERNIA REPAIR      Anterior Gastropexy    RENAL MASS EXCISION Right     Cryosurgical Ablation    TONSILECTOMY AND ADNOIDECTOMY      TONSILLECTOMY         Family History   Problem Relation Age of Onset    Dementia Mother     Heart disease Brother         Arteriosclerotic Cardiovascular     I have reviewed and agree with the history as documented  E-Cigarette/Vaping     E-Cigarette/Vaping Substances     Social History     Tobacco Use    Smoking status: Never Smoker    Smokeless tobacco: Never Used   Substance Use Topics    Alcohol use: Not Currently    Drug use: No        Review of Systems   Unable to perform ROS: Dementia (see HPI above for details)       Physical Exam  ED Triage Vitals [09/06/22 0702]   Temperature Pulse Respirations Blood Pressure SpO2   98 7 °F (37 1 °C) 91 16 147/90 93 %      Temp Source Heart Rate Source Patient Position - Orthostatic VS BP Location FiO2 (%)   Oral -- -- -- --      Pain Score       No Pain             Orthostatic Vital Signs  Vitals:    09/06/22 1200 09/06/22 1330 09/06/22 1515 09/06/22 1530   BP: 143/77 162/91  (!) 176/98   Pulse: 90 96 89 97       Physical Exam  Vitals and nursing note reviewed  Exam conducted with a chaperone present  Constitutional:       General: She is awake  She is not in acute distress  Appearance: Normal appearance   She is not ill-appearing  HENT:      Head: Normocephalic and atraumatic  Right Ear: External ear normal       Left Ear: External ear normal       Nose: Nose normal       Mouth/Throat:      Mouth: Mucous membranes are moist  No oral lesions  Pharynx: Oropharynx is clear  No pharyngeal swelling or posterior oropharyngeal erythema  Eyes:      General: Lids are normal       Extraocular Movements: Extraocular movements intact  Pupils: Pupils are equal, round, and reactive to light  Cardiovascular:      Rate and Rhythm: Normal rate  Rhythm regularly irregular  Pulses: Normal pulses  Heart sounds: No murmur heard  Pulmonary:      Effort: Pulmonary effort is normal  No respiratory distress  Breath sounds: Normal breath sounds  Chest:      Chest wall: No tenderness  Abdominal:      General: Bowel sounds are normal       Palpations: Abdomen is soft  Tenderness: There is no abdominal tenderness  Genitourinary:     General: Normal vulva  Exam position: Lithotomy position  Pubic Area: No rash  Labia:         Right: No rash  Comments: No bleeding from vaginal opening, stool on bu  Musculoskeletal:      Cervical back: Normal range of motion and neck supple  Right lower leg: No edema  Left lower leg: No edema  Neurological:      Mental Status: She is alert  Psychiatric:         Behavior: Behavior is cooperative           ED Medications  Medications   bacitracin topical ointment 1 small application (1 small application Topical Not Given 9/6/22 1549)   doxycycline hyclate (VIBRAMYCIN) capsule 100 mg (100 mg Oral Given 9/6/22 1325)       Diagnostic Studies  Results Reviewed     Procedure Component Value Units Date/Time    UA w Reflex to Microscopic w Reflex to Culture [[id number]] Collected: 09/06/22 1018    Lab Status: Final result Specimen: Urine, Straight Cath Updated: 09/06/22 1054     Color, UA Light Yellow     Clarity, UA Clear     Specific [place], UA 1 019 pH, UA 6 0     Leukocytes, UA Negative     Nitrite, UA Negative     Protein, UA Negative mg/dl      Glucose, UA Negative mg/dl      Ketones, UA Negative mg/dl      Urobilinogen, UA <2 0 mg/dl      Bilirubin, UA Negative     Occult Blood, UA Negative    Comprehensive metabolic panel [[id number]]  (Abnormal) Collected: 09/06/22 0737    Lab Status: Final result Specimen: Blood from Arm, Right Updated: 09/06/22 0859     Sodium 136 mmol/L      Potassium 4 2 mmol/L      Chloride 101 mmol/L      CO2 26 mmol/L      ANION GAP 9 mmol/L      BUN 15 mg/dL      Creatinine 0 64 mg/dL      Glucose 124 mg/dL      Calcium 10 3 mg/dL      AST 20 U/L      ALT 17 U/L      Alkaline Phosphatase 94 U/L      Total Protein 7 4 g/dL      Albumin 3 9 g/dL      Total Bilirubin 1 13 mg/dL      eGFR 80 ml/min/1 73sq m     Narrative:      [place] guidelines for Chronic Kidney Disease (CKD):     Stage 1 with normal or high GFR (GFR > 90 mL/min/1 73 square meters)    Stage 2 Mild CKD (GFR = 60-89 mL/min/1 73 square meters)    Stage 3A Moderate CKD (GFR = 45-59 mL/min/1 73 square meters)    Stage 3B Moderate CKD (GFR = 30-44 mL/min/1 73 square meters)    Stage 4 Severe CKD (GFR = 15-29 mL/min/1 73 square meters)    Stage 5 End Stage CKD (GFR <15 mL/min/1 73 square meters)  Note: GFR calculation is accurate only with a steady state creatinine    Protime-INR [[id number]]  (Abnormal) Collected: 09/06/22 0737    Lab Status: Final result Specimen: Blood from Arm, Right Updated: 09/06/22 0813     Protime 23 6 seconds      INR 2 08    APTT [[id number]]  (Abnormal) Collected: 09/06/22 0737    Lab Status: Final result Specimen: Blood from Arm, Right Updated: 09/06/22 0813     PTT 38 seconds     HS Troponin I 2hr [[id number]]     Lab Status: No result Specimen: Blood     HS Troponin I 4hr [[id number]]     Lab Status: No result Specimen: Blood     HS Troponin 0hr (reflex protocol) [[id number]]  (Normal) Collected: 09/06/22 0737 Lab Status: Final result Specimen: Blood from Arm, Right Updated: 09/06/22 0810     hs TnI 0hr 4 ng/L     CBC and differential [[id number]]  (Abnormal) Collected: 09/06/22 0737    Lab Status: Final result Specimen: Blood from Arm, Right Updated: 09/06/22 0745     WBC 10 59 Thousand/uL      RBC 5 09 Million/uL      Hemoglobin 14 8 g/dL      Hematocrit 44 2 %      MCV 87 fL      MCH 29 1 pg      MCHC 33 5 g/dL      RDW 15 6 %      MPV 12 0 fL      Platelets 549 Thousands/uL      nRBC 0 /100 WBCs      Neutrophils Relative 77 %      Immat GRANS % 0 %      Lymphocytes Relative 16 %      Monocytes Relative 6 %      Eosinophils Relative 0 %      Basophils Relative 1 %      Neutrophils Absolute 8 09 Thousands/µL      Immature Grans Absolute 0 04 Thousand/uL      Lymphocytes Absolute 1 73 Thousands/µL      Monocytes Absolute 0 62 Thousand/µL      Eosinophils Absolute 0 04 Thousand/µL      Basophils Absolute 0 07 Thousands/µL                  XR thumb first digit-min 2 views RIGHT   Final Result by Hannah Horan MD (09/06 1058)      No acute osseous abnormality  Multiarticular arthritis as above   Widened scapholunate interspace suggesting tear/deficiency of this ligament            Workstation performed: [id number]         XR thumb first digit-min 2 views LEFT   Final Result by Hannah Horan MD (09/06 1059)      Left 1st finger soft tissue swelling, and multiarticular arthritis  Widened scapholunate interspace suggesting tear/deficiency of this ligament  No acute fracture            Workstation performed: [id number]         XR chest 1 view portable   Final Result by Himanshu Pelaez MD (09/06 1025)      No acute cardiopulmonary disease                    Workstation performed: [id number]               Procedures  ECG 12 Lead Documentation Only    Date/Time: 9/6/2022 8:58 AM  Performed by: Sid Salcedo DO  Authorized by: Sid Salcedo DO     Indications / Diagnosis:  ? syncope  ECG reviewed by me, the ED Provider: yes    Patient location:  ED  Previous ECG:     Previous ECG:  Compared to current    Comparison ECG info:  2/2/22: a-fib, Nonspecific ST and T wave changes    Similarity:  No change    Comparison to cardiac monitor: Yes    Rate:     ECG rate:  93  Rhythm:     Rhythm: atrial fibrillation    Ectopy:     Ectopy: none    QRS:     QRS axis:  Normal  ST segments:     ST segments:  Non-specific  T waves:     T waves: non-specific    Incision and drain    Date/Time: 9/6/2022 11:51 AM  Performed by: Matthew Ferguson DO  Authorized by: Matthew Ferguson DO   Universal Protocol:  Consent: Verbal consent obtained  Patient identity confirmed: arm band      Patient location:  ED  Location:     Indications for incision and drainage: paronychia  Location:  Upper extremity    Upper extremity location:  L thumb  Pre-procedure details:     Skin preparation:  Betadine  Anesthesia (see MAR for exact dosages): Anesthesia method: ice  Procedure details:     Complexity:  Simple    Incision types:  Stab incision    Scalpel blade:  11    Approach:  Puncture    Incision depth:  Subcutaneous    Drainage:  Bloody and purulent    Drainage amount: Moderate    Wound treatment:  Wound left open  Post-procedure details:     Patient tolerance of procedure: Tolerated well, no immediate complications          ED Course  ED Course as of 09/06/22 1749   Tue Sep 06, 2022   0800 Comprehensive metabolic panel(!)  Unremarkable   1030 UA w Reflex to Microscopic w Reflex to Culture  Within normal limits   1748 PTT(!): 38  Is on Coumadin   1748 Protime-INR(!)  PT:  23 6, INR:  2 08    Is on Coumadin   1748 hs TnI 0hr: 4       MDM  Number of Diagnoses or Management Options  Paronychia of thumb, left: new and requires workup  Diagnosis management comments: Presents with vaginal bleeding, unresponsive episode  Pelvic exam done:  No blood noted in the vaginal vault, did not have any unresponsive episodes in the emergency department  Vitals:  Stable during ED course  Pt receiving: doxycyline, bacitracin   EKG:  See my interpretation above  Imaging: The radiology interpretation above  Labs:  See my interpretation in the ED course  Results discussed with pt  Plan: d/c back home to nursing home facility with rx for doxycycline and instructed to follow-up with PCP and orthopedics for paronychia  Instructed to return to the emergency department for any worsening symptoms, including but not limited to:  Fever, nausea, vomiting, altered mental status, worsening redness to the left thumb, increased bleeding  Plan discussed with Nursing Facility, they were given the opportunity to ask questions, all questions and concerns were addressed  Pt was given the opportunity to ask questions  All questions and concerns were addressed during  Amount and/or Complexity of Data Reviewed  Clinical lab tests: ordered and reviewed  Review and summarize past medical records: yes  Independent visualization of images, tracings, or specimens: yes        Disposition  Final diagnoses:   Paronychia of thumb, left     Time reflects when diagnosis was documented in both MDM as applicable and the Disposition within this note     Time User Action Codes Description Comment    9/6/2022 12:01 PM Fiordaliza Forward Add [L03 012] Paronychia of thumb, left       ED Disposition     ED Disposition   Discharge    Condition   Stable    Date/Time   Tue Sep 6, 2022 12:00 PM    [address] discharge to home/self care                 Follow-up Information     Follow up With Specialties Details Why Contact Info Additional [address] [facility] [place] [facility] Schedule an appointment as soon as possible for a visit in 1 day for further evaluation of L thumb [address]  [address] [id number]  [address] [facility] [address], [address], [place], [place], [address]    [address] Emergency Department Emergency Medicine Go to  As needed, If symptoms worsen [address] Emergency Department, [address], [place] [address], [place], [address] MD [facility], Family Medicine Call today to schedule follow-up appointment for post-ER visit [facility]  [phone]             Discharge Medication List as of 9/6/2022 12:16 PM      START taking these medications    Details   doxycycline hyclate (VIBRAMYCIN) 100 mg capsule Take 1 capsule (100 mg total) by mouth 2 (two) times a day for 7 days, Starting Tue 9/6/2022, Until Tue 9/13/2022, Normal         CONTINUE these medications which have NOT CHANGED    Details   acetaminophen (TYLENOL) 325 mg tablet TAKE ONE TABLET BY MOUTH EVERY FOUR HOURS AS NEEDED FOR PAIN / TEMP, Normal      amLODIPine (NORVASC) 5 mg tablet TAKE ONE TABLET BY MOUTH DAILY IN THE MORNING DX  HTN, Historical Med      atorvastatin (LIPITOR) 20 mg tablet Take 1 tablet (20 mg total) by mouth daily, Starting Thu 6/28/2018, Print      clotrimazole-betamethasone (LOTRISONE) 1-0 05 % cream APPLY TO RASH TWICE DAILY AS NEEDED DX: RASH, Normal      cyanocobalamin (VITAMIN B-12) 1,000 mcg tablet Take 1 tablet (1,000 mcg total) by mouth daily, Starting Thu 6/28/2018, Print      donepezil (ARICEPT) 10 mg tablet Take 1 tablet (10 mg total) by mouth daily at bedtime, Starting Thu 6/28/2018, Print      hydrochlorothiazide (HYDRODIURIL) 12 5 mg tablet Take 1 tablet (12 5 mg total) by mouth daily, Starting Fri 5/27/2022, No Print      lisinopril (ZESTRIL) 40 mg tablet Take 1 tablet (40 mg total) by mouth daily, Starting Fri 5/27/2022, No Print      metoprolol tartrate (LOPRESSOR) 25 mg tablet Take 1 tablet (25 mg total) by mouth 2 (two) times a day, Starting Thu 6/28/2018, Print POTASSIUM CHLORIDE ER PO Take 20 mEq by mouth daily, Historical Med      warfarin (COUMADIN) 3 mg tablet Take by mouth daily, Historical Med           No discharge procedures on file  PDMP Review       Value Time User    PDMP Reviewed  Yes 3/20/2021 10:10 AM Vanessa Kirby           ED Provider  Attending physically available and evaluated Gladis Arzola  VARUN managed the patient along with the ED Attending      Electronically Signed by         Sergio Light DO  09/06/22 6155

## 2022-09-07 LAB
ATRIAL RATE: 394 BPM
QRS AXIS: 31 DEGREES
QRSD INTERVAL: 100 MS
QT INTERVAL: 370 MS
QTC INTERVAL: 460 MS
T WAVE AXIS: 12 DEGREES
VENTRICULAR RATE: 93 BPM

## 2022-09-07 PROCEDURE — 93010 ELECTROCARDIOGRAM REPORT: CPT | Performed by: INTERNAL MEDICINE

## 2022-09-07 NOTE — ED ATTENDING ATTESTATION
9/6/2022  IAsha DO, saw and evaluated the patient  I have discussed the patient with the resident/non-physician practitioner and agree with the resident's/non-physician practitioner's findings, Plan of Care, and MDM as documented in the resident's/non-physician practitioner's note, except where noted  All available labs and Radiology studies were reviewed  I was present for key portions of any procedure(s) performed by the resident/non-physician practitioner and I was immediately available to provide assistance  At this point I agree with the current assessment done in the Emergency Department  I have conducted an independent evaluation of this patient a history and physical is as follows:    81 yo F w/ dementia coming into the ED for eval of reported "vaginal bleeding", further clarified as "pink colored urine" by nursing home staff  They also report swelling and redness to her left thumb  The patient is poor historian and unable to provide further details  On physical exam: pt is well appearing, in NAD  Pleasantly confused, disoriented baseline  mucous membranes moist   CTA b/l , heart RRR  Abdomen NT, ND, no R/R/G  Normal bowel sounds  Neuro intact, gcs 14 for confusion  Cap refill < 2 sec, skin warm and dry  Left thumb: large paronychia present with large fluid collection w/ pus and blood present, mild erythema to the entire distal digit  Redness not extending beyond the thumb  ED Course     Paronychia drained, start on antibiotics doxy for associated cellulitis  Stable for d/c home back to nursing home      Critical Care Time  Procedures

## 2022-09-13 ENCOUNTER — APPOINTMENT (OUTPATIENT)
Dept: RADIOLOGY | Facility: HOSPITAL | Age: 87
End: 2022-09-13
Payer: MEDICARE

## 2022-09-13 ENCOUNTER — HOSPITAL ENCOUNTER (EMERGENCY)
Facility: HOSPITAL | Age: 87
Discharge: HOME | End: 2022-09-13
Attending: EMERGENCY MEDICINE
Payer: MEDICARE

## 2022-09-13 VITALS
OXYGEN SATURATION: 96 % | SYSTOLIC BLOOD PRESSURE: 106 MMHG | RESPIRATION RATE: 18 BRPM | HEART RATE: 95 BPM | DIASTOLIC BLOOD PRESSURE: 73 MMHG | TEMPERATURE: 98.1 F

## 2022-09-13 DIAGNOSIS — M25.552 HIP PAIN, BILATERAL: Primary | ICD-10-CM

## 2022-09-13 DIAGNOSIS — M25.551 HIP PAIN, BILATERAL: Primary | ICD-10-CM

## 2022-09-13 LAB
ANION GAP SERPL CALCULATED.3IONS-SCNC: 7 MMOL/L (ref 4–13)
APTT PPP: 36 SECONDS (ref 23–37)
BASOPHILS # BLD AUTO: 0.07 THOUSANDS/ΜL (ref 0–0.1)
BASOPHILS NFR BLD AUTO: 1 % (ref 0–1)
BUN SERPL-MCNC: 23 MG/DL (ref 5–25)
CALCIUM SERPL-MCNC: 10.6 MG/DL (ref 8.4–10.2)
CHLORIDE SERPL-SCNC: 105 MMOL/L (ref 96–108)
CO2 SERPL-SCNC: 24 MMOL/L (ref 21–32)
CREAT SERPL-MCNC: 0.62 MG/DL (ref 0.6–1.3)
EOSINOPHIL # BLD AUTO: 0.01 THOUSAND/ΜL (ref 0–0.61)
EOSINOPHIL NFR BLD AUTO: 0 % (ref 0–6)
ERYTHROCYTE [DISTWIDTH] IN BLOOD BY AUTOMATED COUNT: 16.3 % (ref 11.6–15.1)
GFR SERPL CREATININE-BSD FRML MDRD: 81 ML/MIN/1.73SQ M
GLUCOSE SERPL-MCNC: 120 MG/DL (ref 65–140)
HCT VFR BLD AUTO: 45.7 % (ref 34.8–46.1)
HGB BLD-MCNC: 15 G/DL (ref 11.5–15.4)
IMM GRANULOCYTES # BLD AUTO: 0.05 THOUSAND/UL (ref 0–0.2)
IMM GRANULOCYTES NFR BLD AUTO: 0 % (ref 0–2)
INR PPP: 3.38 (ref 0.84–1.19)
LYMPHOCYTES # BLD AUTO: 1.65 THOUSANDS/ΜL (ref 0.6–4.47)
LYMPHOCYTES NFR BLD AUTO: 14 % (ref 14–44)
MCH RBC QN AUTO: 29.6 PG (ref 26.8–34.3)
MCHC RBC AUTO-ENTMCNC: 32.8 G/DL (ref 31.4–37.4)
MCV RBC AUTO: 90 FL (ref 82–98)
MONOCYTES # BLD AUTO: 1.16 THOUSAND/ΜL (ref 0.17–1.22)
MONOCYTES NFR BLD AUTO: 10 % (ref 4–12)
NEUTROPHILS # BLD AUTO: 8.63 THOUSANDS/ΜL (ref 1.85–7.62)
NEUTS SEG NFR BLD AUTO: 75 % (ref 43–75)
NRBC BLD AUTO-RTO: 0 /100 WBCS
PLATELET # BLD AUTO: 272 THOUSANDS/UL (ref 149–390)
PMV BLD AUTO: 11.7 FL (ref 8.9–12.7)
POTASSIUM SERPL-SCNC: 5.5 MMOL/L (ref 3.5–5.3)
PROTHROMBIN TIME: 34.5 SECONDS (ref 11.6–14.5)
RBC # BLD AUTO: 5.07 MILLION/UL (ref 3.81–5.12)
SODIUM SERPL-SCNC: 136 MMOL/L (ref 135–147)
WBC # BLD AUTO: 11.57 THOUSAND/UL (ref 4.31–10.16)

## 2022-09-13 PROCEDURE — 85610 PROTHROMBIN TIME: CPT | Performed by: EMERGENCY MEDICINE

## 2022-09-13 PROCEDURE — 73564 X-RAY EXAM KNEE 4 OR MORE: CPT

## 2022-09-13 PROCEDURE — 85730 THROMBOPLASTIN TIME PARTIAL: CPT | Performed by: EMERGENCY MEDICINE

## 2022-09-13 PROCEDURE — 73502 X-RAY EXAM HIP UNI 2-3 VIEWS: CPT

## 2022-09-13 PROCEDURE — 36415 COLL VENOUS BLD VENIPUNCTURE: CPT | Performed by: EMERGENCY MEDICINE

## 2022-09-13 PROCEDURE — 80048 BASIC METABOLIC PNL TOTAL CA: CPT | Performed by: EMERGENCY MEDICINE

## 2022-09-13 PROCEDURE — 85025 COMPLETE CBC W/AUTO DIFF WBC: CPT | Performed by: EMERGENCY MEDICINE

## 2022-09-13 PROCEDURE — 99284 EMERGENCY DEPT VISIT MOD MDM: CPT

## 2022-09-13 PROCEDURE — 99285 EMERGENCY DEPT VISIT HI MDM: CPT | Performed by: EMERGENCY MEDICINE

## 2022-09-13 NOTE — ED PROVIDER NOTES
History  Chief Complaint   Patient presents with    Pain     Pt sent from nursing facility via ems for R hip and R knee pain  Pt has no complaints at this time  Andrea Morin is an 80 y o  female who presents with the chief complaint of right hip and knee pain (at least according to EMS staff, no complaint sheet sent with patient from [facility])  She reports to me that she has no complaints and is in no pain  She tolerated palpation and passive rom of the right leg without apparent discomfort however she does have tenderness and pain in the left hip and leg  No obvious deformity but I suspect shortening in her left leg  No fevers or chills  History provided by:  Patient, medical records and EMS personnel   used: No    Hip Pain  Location:  Right hip and knee (possibly left hip)  Quality:  Unable to specify  Severity:  Unable to specify  Onset quality:  Unable to specify  Duration:  1 day  Timing:  Constant  Progression:  Unable to specify  Chronicity:  New  Context:  Nursing home staff report she was complaining of this pain  Relieved by:  Nothing  Worsened by:  Nothing  Ineffective treatments:  Nothing tried  Associated symptoms: no chest pain, no diarrhea, no fever, no nausea, no rash, no shortness of breath and no vomiting        Prior to Admission Medications   Prescriptions Last Dose Informant Patient Reported? Taking?    POTASSIUM CHLORIDE ER PO   Yes Yes   Sig: Take 20 mEq by mouth daily   acetaminophen (TYLENOL) 325 mg tablet   No Yes   Sig: TAKE ONE TABLET BY MOUTH EVERY FOUR HOURS AS NEEDED FOR PAIN / TEMP   amLODIPine (NORVASC) 5 mg tablet   Yes Yes   Sig: TAKE ONE TABLET BY MOUTH DAILY IN THE MORNING DX  HTN   atorvastatin (LIPITOR) 20 mg tablet   No Yes   Sig: Take 1 tablet (20 mg total) by mouth daily   clotrimazole-betamethasone (LOTRISONE) 1-0 05 % cream   No Yes   Sig: APPLY TO RASH TWICE DAILY AS NEEDED DX: RASH   cyanocobalamin (VITAMIN B-12) 1,000 mcg tablet   No Yes Sig: Take 1 tablet (1,000 mcg total) by mouth daily   donepezil (ARICEPT) 10 mg tablet   No Yes   Sig: Take 1 tablet (10 mg total) by mouth daily at bedtime   doxycycline hyclate (VIBRAMYCIN) 100 mg capsule   No Yes   Sig: Take 1 capsule (100 mg total) by mouth 2 (two) times a day for 7 days   hydrochlorothiazide (HYDRODIURIL) 12 5 mg tablet   No Yes   Sig: Take 1 tablet (12 5 mg total) by mouth daily   lisinopril (ZESTRIL) 40 mg tablet   No Yes   Sig: Take 1 tablet (40 mg total) by mouth daily   metoprolol tartrate (LOPRESSOR) 25 mg tablet   No Yes   Sig: Take 1 tablet (25 mg total) by mouth 2 (two) times a day   warfarin (COUMADIN) 3 mg tablet   Yes Yes   Sig: Take by mouth daily      Facility-Administered Medications: None       Past Medical History:   Diagnosis Date    Essential hypertension 4/11/2021    Late onset Alzheimer's disease without behavioral disturbance ([facility] [address] ) 4/11/2021    Mixed hyperlipidemia 4/11/2021    [facility] 4[facility]       Past Surgical History:   Procedure Laterality Date    APPENDECTOMY      CATARACT EXTRACTION, BILATERAL Bilateral     EYE SURGERY      Cataract    HIATAL HERNIA REPAIR      Anterior Gastropexy    RENAL MASS EXCISION Right     Cryosurgical Ablation    TONSILECTOMY AND ADNOIDECTOMY      TONSILLECTOMY         Family History   Problem Relation Age of Onset    Dementia Mother     Heart disease Brother         Arteriosclerotic Cardiovascular     I have reviewed and agree with the history as documented  E-Cigarette/Vaping     E-Cigarette/Vaping Substances     Social History     Tobacco Use    Smoking status: Never Smoker    Smokeless tobacco: Never Used   Substance Use Topics    Alcohol use: Not Currently    Drug use: No       Review of Systems   Constitutional: Negative for chills, diaphoresis and fever  Respiratory: Negative for shortness of breath  Cardiovascular: Negative for chest pain and palpitations     Gastrointestinal: Negative for diarrhea, nausea and vomiting  Genitourinary: Negative for dysuria and frequency  Musculoskeletal: Positive for arthralgias (reported right knee and hip, ?left hip)  Skin: Negative for color change, rash and wound  Psychiatric/Behavioral: Positive for confusion (chronic dementia)  All other systems reviewed and are negative  Physical Exam  Physical Exam  Vitals and nursing note reviewed  Constitutional:       General: She is not in acute distress  Appearance: She is well-developed  HENT:      Head: Normocephalic and atraumatic  Eyes:      Extraocular Movements: Extraocular movements intact  Pupils: Pupils are equal, round, and reactive to light  Neck:      Vascular: No JVD  Cardiovascular:      Rate and Rhythm: Normal rate and regular rhythm  Heart sounds: Normal heart sounds  No murmur heard  No friction rub  No gallop  Pulmonary:      Effort: Pulmonary effort is normal  No respiratory distress  Breath sounds: Normal breath sounds  No wheezing or rales  Chest:      Chest wall: No tenderness  Musculoskeletal:         General: Tenderness (left hip) and deformity (shortened left leg) present  Normal range of motion  Cervical back: Normal range of motion  Skin:     General: Skin is warm and dry  Neurological:      General: No focal deficit present  Mental Status: She is alert  Mental status is at baseline  Psychiatric:         Behavior: Behavior normal          Thought Content:  Thought content normal          Judgment: Judgment normal          Vital Signs  ED Triage Vitals [09/13/22 1315]   Temperature Pulse Respirations Blood Pressure SpO2   98 1 °F (36 7 °C) 95 18 106/73 96 %      Temp Source Heart Rate Source Patient Position - Orthostatic VS BP Location FiO2 (%)   Oral Monitor Sitting Right arm --      Pain Score       --           Vitals:    09/13/22 1315   BP: 106/73   Pulse: 95   Patient Position - Orthostatic VS: Sitting Visual Acuity      ED Medications  Medications - No data to display    Diagnostic Studies  Results Reviewed     Procedure Component Value Units Date/Time    Protime-INR [[id number]]  (Abnormal) Collected: 09/13/22 1449    Lab Status: Final result Specimen: Blood from Line, Venous Updated: 09/13/22 1538     Protime 34 5 seconds      INR 3 38    APTT [[id number]]  (Normal) Collected: 09/13/22 1449    Lab Status: Final result Specimen: Blood from Line, Venous Updated: 09/13/22 1538     PTT 36 seconds     CBC and differential [[id number]]  (Abnormal) Collected: 09/13/22 1449    Lab Status: Final result Specimen: Blood from Line, Venous Updated: 09/13/22 1502     WBC 11 57 Thousand/uL      RBC 5 07 Million/uL      Hemoglobin 15 0 g/dL      Hematocrit 45 7 %      MCV 90 fL      MCH 29 6 pg      MCHC 32 8 g/dL      RDW 16 3 %      MPV 11 7 fL      Platelets 645 Thousands/uL      nRBC 0 /100 WBCs      Neutrophils Relative 75 %      Immat GRANS % 0 %      Lymphocytes Relative 14 %      Monocytes Relative 10 %      Eosinophils Relative 0 %      Basophils Relative 1 %      Neutrophils Absolute 8 63 Thousands/µL      Immature Grans Absolute 0 05 Thousand/uL      Lymphocytes Absolute 1 65 Thousands/µL      Monocytes Absolute 1 16 Thousand/µL      Eosinophils Absolute 0 01 Thousand/µL      Basophils Absolute 0 07 Thousands/µL     Basic metabolic panel [[id number]] Collected: 09/13/22 1449    Lab Status: In process Specimen: Blood from Line, Venous Updated: 09/13/22 1454                 XR hip/pelv 2-3 vws left   ED Interpretation by Israel Melendez MD (09/13 8271)   This film was interpreted independently by me  No fracture or dislocation  XR knee 4+ views Right injury   ED Interpretation by Israel Melendez MD (09/13 5877)   This film was interpreted independently by me  No fracture or dislocation            XR hip/pelv 2-3 vws right   ED Interpretation by Israel Melendez MD (09/13 8218)   This film was interpreted independently by me  Left hip suspected fracture  Right hip no fracture or dislocation  Procedures  Procedures         ED Course  ED Course as of 09/13/22 1541   Tue Sep 13, 2022   1435 The patient's left hip is abnormal on xray  I went back and examined this leg and she does have tenderness over the hip joint and with rom  Will get dedicated imaging  SBIRT 22yo+    Flowsheet Row Most Recent Value   SBIRT (25 yo +)    In order to provide better care to our patients, we are screening all of our patients for alcohol and drug use  Would it be okay to ask you these screening questions? Unable to answer at this time Filed at: 09/13/2022 1451                    MDM  Number of Diagnoses or Management Options  Hip pain, bilateral: new and requires workup  Diagnosis management comments: Background: 80 y o  female with right hip and knee pain    Differential DX includes but is not limited to: fracture vs contusion vs sprain     Plan: imaging, symptom control         Amount and/or Complexity of Data Reviewed  Tests in the radiology section of CPT®: ordered and reviewed  Independent visualization of images, tracings, or specimens: yes    Risk of Complications, Morbidity, and/or Mortality  Diagnostic procedures: high    Patient Progress  Patient progress: stable      Disposition  Final diagnoses:   Hip pain, bilateral     Time reflects when diagnosis was documented in both MDM as applicable and the Disposition within this note     Time User Action Codes Description Comment    9/13/2022  3:41 PM Anastacio Stone Add [J20 971,  L66 142] Hip pain, bilateral       ED Disposition     ED Disposition   Discharge    Condition   Stable    Date/Time   Tue Sep 13, 2022  3:41 PM    [address] discharge to home/self care                 Follow-up Information     Follow up With Specialties Details Why Contact Info    Vero Josue MD Internal Medicine, Family Medicine Schedule an appointment as soon as possible for a visit in 1 week  [address] [facility]  [phone]            Patient's Medications   Discharge Prescriptions    No medications on file       No discharge procedures on file      PDMP Review       Value Time User    PDMP Reviewed  Yes 3/20/2021 10:10 AM FARHAT Ambriz          ED Provider  Electronically Signed by           Sally Ashley MD  09/13/22 9152

## 2022-09-13 NOTE — ED NOTES
Called [facility] at this time for transport back to [facility]  Will call back with pick-up time       Sharlene Burton RN  09/13/22 6788

## 2022-10-04 ENCOUNTER — HOSPITAL ENCOUNTER (EMERGENCY)
Facility: HOSPITAL | Age: 87
Discharge: HOME | End: 2022-10-04
Attending: EMERGENCY MEDICINE
Payer: MEDICARE

## 2022-10-04 ENCOUNTER — APPOINTMENT (EMERGENCY)
Dept: CT IMAGING | Facility: HOSPITAL | Age: 87
End: 2022-10-04
Payer: MEDICARE

## 2022-10-04 VITALS
HEART RATE: 78 BPM | DIASTOLIC BLOOD PRESSURE: 92 MMHG | TEMPERATURE: 98.9 F | BODY MASS INDEX: 34.64 KG/M2 | OXYGEN SATURATION: 96 % | WEIGHT: 205 LBS | RESPIRATION RATE: 18 BRPM | SYSTOLIC BLOOD PRESSURE: 143 MMHG

## 2022-10-04 DIAGNOSIS — F03.90 DEMENTIA, UNSPECIFIED DEMENTIA SEVERITY, UNSPECIFIED DEMENTIA TYPE, UNSPECIFIED WHETHER BEHAVIORAL, PSYCHOTIC, OR MOOD DISTURBANCE OR ANXIETY (HCC): Primary | ICD-10-CM

## 2022-10-04 DIAGNOSIS — R79.1 SUPRATHERAPEUTIC INR: ICD-10-CM

## 2022-10-04 LAB
ALBUMIN SERPL BCP-MCNC: 3.6 G/DL (ref 3.5–5)
ALP SERPL-CCNC: 98 U/L (ref 34–104)
ALT SERPL W P-5'-P-CCNC: 24 U/L (ref 7–52)
ANION GAP SERPL CALCULATED.3IONS-SCNC: 8 MMOL/L (ref 4–13)
APTT PPP: 59 SECONDS (ref 23–37)
AST SERPL W P-5'-P-CCNC: 36 U/L (ref 13–39)
BASOPHILS # BLD AUTO: 0.04 THOUSANDS/ΜL (ref 0–0.1)
BASOPHILS NFR BLD AUTO: 0 % (ref 0–1)
BILIRUB SERPL-MCNC: 1.07 MG/DL (ref 0.2–1)
BUN SERPL-MCNC: 18 MG/DL (ref 5–25)
CALCIUM SERPL-MCNC: 10.4 MG/DL (ref 8.4–10.2)
CHLORIDE SERPL-SCNC: 101 MMOL/L (ref 96–108)
CO2 SERPL-SCNC: 27 MMOL/L (ref 21–32)
CREAT SERPL-MCNC: 0.64 MG/DL (ref 0.6–1.3)
EOSINOPHIL # BLD AUTO: 0.06 THOUSAND/ΜL (ref 0–0.61)
EOSINOPHIL NFR BLD AUTO: 1 % (ref 0–6)
ERYTHROCYTE [DISTWIDTH] IN BLOOD BY AUTOMATED COUNT: 15.7 % (ref 11.6–15.1)
GFR SERPL CREATININE-BSD FRML MDRD: 80 ML/MIN/1.73SQ M
GLUCOSE SERPL-MCNC: 93 MG/DL (ref 65–140)
HCT VFR BLD AUTO: 46.2 % (ref 34.8–46.1)
HGB BLD-MCNC: 15.2 G/DL (ref 11.5–15.4)
IMM GRANULOCYTES # BLD AUTO: 0.03 THOUSAND/UL (ref 0–0.2)
IMM GRANULOCYTES NFR BLD AUTO: 0 % (ref 0–2)
INR PPP: 5.64 (ref 0.84–1.19)
LYMPHOCYTES # BLD AUTO: 2.25 THOUSANDS/ΜL (ref 0.6–4.47)
LYMPHOCYTES NFR BLD AUTO: 22 % (ref 14–44)
MCH RBC QN AUTO: 29.2 PG (ref 26.8–34.3)
MCHC RBC AUTO-ENTMCNC: 32.9 G/DL (ref 31.4–37.4)
MCV RBC AUTO: 89 FL (ref 82–98)
MONOCYTES # BLD AUTO: 0.92 THOUSAND/ΜL (ref 0.17–1.22)
MONOCYTES NFR BLD AUTO: 9 % (ref 4–12)
NEUTROPHILS # BLD AUTO: 6.76 THOUSANDS/ΜL (ref 1.85–7.62)
NEUTS SEG NFR BLD AUTO: 68 % (ref 43–75)
NRBC BLD AUTO-RTO: 0 /100 WBCS
PLATELET # BLD AUTO: 177 THOUSANDS/UL (ref 149–390)
PMV BLD AUTO: 12.9 FL (ref 8.9–12.7)
POTASSIUM SERPL-SCNC: 4.5 MMOL/L (ref 3.5–5.3)
PROT SERPL-MCNC: 6.8 G/DL (ref 6.4–8.4)
PROTHROMBIN TIME: 51.2 SECONDS (ref 11.6–14.5)
RBC # BLD AUTO: 5.21 MILLION/UL (ref 3.81–5.12)
SODIUM SERPL-SCNC: 136 MMOL/L (ref 135–147)
WBC # BLD AUTO: 10.06 THOUSAND/UL (ref 4.31–10.16)

## 2022-10-04 PROCEDURE — 36415 COLL VENOUS BLD VENIPUNCTURE: CPT | Performed by: EMERGENCY MEDICINE

## 2022-10-04 PROCEDURE — 99285 EMERGENCY DEPT VISIT HI MDM: CPT

## 2022-10-04 PROCEDURE — 85025 COMPLETE CBC W/AUTO DIFF WBC: CPT | Performed by: EMERGENCY MEDICINE

## 2022-10-04 PROCEDURE — 70450 CT HEAD/BRAIN W/O DYE: CPT

## 2022-10-04 PROCEDURE — 80053 COMPREHEN METABOLIC PANEL: CPT | Performed by: EMERGENCY MEDICINE

## 2022-10-04 PROCEDURE — G1004 CDSM NDSC: HCPCS

## 2022-10-04 PROCEDURE — 93005 ELECTROCARDIOGRAM TRACING: CPT

## 2022-10-04 PROCEDURE — 85610 PROTHROMBIN TIME: CPT | Performed by: EMERGENCY MEDICINE

## 2022-10-04 PROCEDURE — 85730 THROMBOPLASTIN TIME PARTIAL: CPT | Performed by: EMERGENCY MEDICINE

## 2022-10-04 PROCEDURE — 99284 EMERGENCY DEPT VISIT MOD MDM: CPT | Performed by: EMERGENCY MEDICINE

## 2022-10-04 NOTE — DISCHARGE INSTRUCTIONS
Please hold your Coumadin for 2 days  , please have it rechecked on Thursday, please continue as directed by your family doctor

## 2022-10-04 NOTE — ED PROVIDER NOTES
History  Chief Complaint   Patient presents with    Altered Mental Status     Pt with increased confusion from baseline from [facility]           77-year-old female, brought in for worsening confusion from baseline dementia , no further clarification as per paramedics, they were told that patient is normally demented but today was more confused  , no reported falls injury or trauma but the patient herself says that she did have a fall yesterday, there is very questionable reliability due to the dementia  Patient denies any complaints at this time, no headache no neck pain no chest pain no abdominal pain, no neck pain no extremity pain  History provided by:  Patient  History limited by:  Dementia      Prior to Admission Medications   Prescriptions Last Dose Informant Patient Reported? Taking?    POTASSIUM CHLORIDE ER PO   Yes No   Sig: Take 20 mEq by mouth daily   acetaminophen (TYLENOL) 325 mg tablet   No No   Sig: TAKE ONE TABLET BY MOUTH EVERY FOUR HOURS AS NEEDED FOR PAIN / TEMP   amLODIPine (NORVASC) 5 mg tablet   Yes No   Sig: TAKE ONE TABLET BY MOUTH DAILY IN THE MORNING DX  HTN   atorvastatin (LIPITOR) 20 mg tablet   No No   Sig: Take 1 tablet (20 mg total) by mouth daily   clotrimazole-betamethasone (LOTRISONE) 1-0 05 % cream   No No   Sig: APPLY TO RASH TWICE DAILY AS NEEDED DX: RASH   cyanocobalamin (VITAMIN B-12) 1,000 mcg tablet   No No   Sig: Take 1 tablet (1,000 mcg total) by mouth daily   donepezil (ARICEPT) 10 mg tablet   No No   Sig: Take 1 tablet (10 mg total) by mouth daily at bedtime   hydrochlorothiazide (HYDRODIURIL) 12 5 mg tablet   No No   Sig: Take 1 tablet (12 5 mg total) by mouth daily   lisinopril (ZESTRIL) 40 mg tablet   No No   Sig: Take 1 tablet (40 mg total) by mouth daily   metoprolol tartrate (LOPRESSOR) 25 mg tablet   No No   Sig: Take 1 tablet (25 mg total) by mouth 2 (two) times a day   warfarin (COUMADIN) 3 mg tablet   Yes No   Sig: Take by mouth daily Facility-Administered Medications: None       Past Medical History:   Diagnosis Date    Essential hypertension 4/11/2021    Late onset [facility]'s disease without behavioral disturbance ([facility] [address] ) 4/11/2021    Mixed hyperlipidemia 4/11/2021    Persistent atrial fibrillation ([facility] ) 4/11/2021       Past Surgical History:   Procedure Laterality Date    APPENDECTOMY      CATARACT EXTRACTION, BILATERAL Bilateral     EYE SURGERY      Cataract    HIATAL HERNIA REPAIR      Anterior Gastropexy    RENAL MASS EXCISION Right     Cryosurgical Ablation    TONSILECTOMY AND ADNOIDECTOMY      TONSILLECTOMY         Family History   Problem Relation Age of Onset    Dementia Mother     Heart disease Brother         Arteriosclerotic Cardiovascular     I have reviewed and agree with the history as documented  E-Cigarette/Vaping     E-Cigarette/Vaping Substances     Social History     Tobacco Use    Smoking status: Never Smoker    Smokeless tobacco: Never Used   Substance Use Topics    Alcohol use: Not Currently    Drug use: No       Review of Systems   Unable to perform ROS: Dementia   Constitutional: Negative for activity change, chills, diaphoresis and fever  HENT: Negative for congestion, sinus pressure and sore throat  Eyes: Negative for pain and visual disturbance  Respiratory: Negative for cough, chest tightness, shortness of breath, wheezing and stridor  Cardiovascular: Negative for chest pain and palpitations  Gastrointestinal: Negative for abdominal distention, abdominal pain, constipation, diarrhea, nausea and vomiting  Genitourinary: Negative for dysuria and frequency  Musculoskeletal: Negative for neck pain and neck stiffness  Skin: Negative for rash  Neurological: Negative for dizziness, speech difficulty, light-headedness, numbness and headaches  Physical Exam  Physical Exam  Vitals reviewed  Constitutional:       General: She is not in acute distress       Appearance: She is well-developed  She is not diaphoretic  HENT:      Head: Normocephalic and atraumatic  Right Ear: External ear normal       Left Ear: External ear normal       Nose: Nose normal    Eyes:      General:         Right eye: No discharge  Left eye: No discharge  Pupils: Pupils are equal, round, and reactive to light  Neck:      Trachea: No tracheal deviation  Cardiovascular:      Rate and Rhythm: Normal rate and regular rhythm  Heart sounds: Normal heart sounds  No murmur heard  Pulmonary:      Effort: Pulmonary effort is normal  No respiratory distress  Breath sounds: Normal breath sounds  No stridor  Abdominal:      General: There is no distension  Palpations: Abdomen is soft  Tenderness: There is no abdominal tenderness  There is no guarding or rebound  Genitourinary:     Rectum: Guaiac result negative  Musculoskeletal:         General: Normal range of motion  Cervical back: Normal range of motion and neck supple  Skin:     General: Skin is warm and dry  Coloration: Skin is not pale  Findings: No erythema  Neurological:      General: No focal deficit present  Mental Status: Mental status is at baseline  She is disoriented  GCS: GCS eye subscore is 4  GCS verbal subscore is 5  GCS motor subscore is 6           Vital Signs  ED Triage Vitals [10/04/22 1728]   Temperature Pulse Respirations Blood Pressure SpO2   98 9 °F (37 2 °C) 78 18 135/90 97 %      Temp Source Heart Rate Source Patient Position - Orthostatic VS BP Location FiO2 (%)   Oral -- -- -- --      Pain Score       No Pain           Vitals:    10/04/22 1728   BP: 135/90   Pulse: 78         Visual Acuity      ED Medications  Medications - No data to display    Diagnostic Studies  Results Reviewed     Procedure Component Value Units Date/Time    Protime-INR [[id number]]  (Abnormal) Collected: 10/04/22 1831    Lab Status: Final result Specimen: Blood from Arm, Left Updated: 10/04/22 1921 Protime 51 2 seconds      INR 5 64    APTT [[id number]]  (Abnormal) Collected: 10/04/22 1831    Lab Status: Final result Specimen: Blood from Arm, Left Updated: 10/04/22 1921     PTT 59 seconds     Comprehensive metabolic panel [[id number]]  (Abnormal) Collected: 10/04/22 1831    Lab Status: Final result Specimen: Blood from Arm, Left Updated: 10/04/22 1907     Sodium 136 mmol/L      Potassium 4 5 mmol/L      Chloride 101 mmol/L      CO2 27 mmol/L      ANION GAP 8 mmol/L      BUN 18 mg/dL      Creatinine 0 64 mg/dL      Glucose 93 mg/dL      Calcium 10 4 mg/dL      AST 36 U/L      ALT 24 U/L      Alkaline Phosphatase 98 U/L      Total Protein 6 8 g/dL      Albumin 3 6 g/dL      Total Bilirubin 1 07 mg/dL      eGFR 80 ml/min/1 73sq m     Narrative:      National Kidney Disease Foundation guidelines for Chronic Kidney Disease (CKD):     Stage 1 with normal or high GFR (GFR > 90 mL/min/1 73 square meters)    Stage 2 Mild CKD (GFR = 60-89 mL/min/1 73 square meters)    Stage 3A Moderate CKD (GFR = 45-59 mL/min/1 73 square meters)    Stage 3B Moderate CKD (GFR = 30-44 mL/min/1 73 square meters)    Stage 4 Severe CKD (GFR = 15-29 mL/min/1 73 square meters)    Stage 5 End Stage CKD (GFR <15 mL/min/1 73 square meters)  Note: GFR calculation is accurate only with a steady state creatinine    CBC and differential [[id number]]  (Abnormal) Collected: 10/04/22 1831    Lab Status: Final result Specimen: Blood from Arm, Left Updated: 10/04/22 1847     WBC 10 06 Thousand/uL      RBC 5 21 Million/uL      Hemoglobin 15 2 g/dL      Hematocrit 46 2 %      MCV 89 fL      MCH 29 2 pg      MCHC 32 9 g/dL      RDW 15 7 %      MPV 12 9 fL      Platelets 376 Thousands/uL      nRBC 0 /100 WBCs      Neutrophils Relative 68 %      Immat GRANS % 0 %      Lymphocytes Relative 22 %      Monocytes Relative 9 %      Eosinophils Relative 1 %      Basophils Relative 0 %      Neutrophils Absolute 6 76 Thousands/µL      Immature Grans Absolute 0 03 Thousand/uL      Lymphocytes Absolute 2 25 Thousands/µL      Monocytes Absolute 0 92 Thousand/µL      Eosinophils Absolute 0 06 Thousand/µL      Basophils Absolute 0 04 Thousands/µL                  CT head without contrast   Final Result by Mariano Hahn MD (10/04 1925)      No acute intracranial abnormality  Workstation performed: [id number]                    Procedures  ECG 12 Lead Documentation Only    Date/Time: 10/4/2022 5:54 PM  Performed by: Roseline Dominguez DO  Authorized by: Roseline Dominguez DO     ECG reviewed by me, the ED Provider: yes    Patient location:  ED  Interpretation:     Interpretation: non-specific    Rate:     ECG rate:  78    ECG rate assessment: normal    Rhythm:     Rhythm: atrial fibrillation    Ectopy:     Ectopy: none    QRS:     QRS axis:  Normal    QRS intervals:  Normal  Conduction:     Conduction: normal    ST segments:     ST segments:  Non-specific  T waves:     T waves: non-specific               ED Course                                             MDM  Number of Diagnoses or Management Options  Dementia, unspecified dementia severity, unspecified dementia type, unspecified whether behavioral, psychotic, or mood disturbance or anxiety ([facility] ): new and requires workup  Supratherapeutic INR: new and requires workup  Diagnosis management comments:       Nontoxic appearance no external signs of trauma  , resting comfortably, workup unremarkable other than elevated INR but no signs of bleeding  , will discharge    Instructed to hold Coumadin       Amount and/or Complexity of Data Reviewed  Clinical lab tests: ordered and reviewed  Tests in the radiology section of CPT®: ordered and reviewed  Decide to obtain previous medical records or to obtain history from someone other than the patient: yes  Obtain history from someone other than the patient: yes  Review and summarize past medical records: yes  Independent visualization of images, tracings, or specimens: yes        Disposition  Final diagnoses:   Dementia, unspecified dementia severity, unspecified dementia type, unspecified whether behavioral, psychotic, or mood disturbance or anxiety ([facility] )   Supratherapeutic INR     Time reflects when diagnosis was documented in both MDM as applicable and the Disposition within this note     Time User Action Codes Description Comment    10/4/2022  7:36 PM Nanette Lambert Add [F03 90] Dementia, unspecified dementia severity, unspecified dementia type, unspecified whether behavioral, psychotic, or mood disturbance or anxiety ([facility] )     10/4/2022  7:36 PM Nanette Lambert Add [R79 1] Supratherapeutic INR       ED Disposition     ED Disposition   Discharge    Condition   Stable    Date/Time   Tue Oct 4, 2022  7:35 PM    [facility] [place] discharge to home/self care  Follow-up Information     Follow up With Specialties Details Why Αμαλίας MD Jose Internal Medicine, Family Medicine Call in 1 day To arrange for the next available appointment, For repeat evaluation and  to ensure resolution  SHe patient had a supratherapeutic INR, please call and advise for when to resume dosing [address] [facility]  [phone]            Patient's Medications   Discharge Prescriptions    No medications on file       No discharge procedures on file      PDMP Review       Value Time User    PDMP Reviewed  Yes 3/20/2021 10:10 AM Vanessa Jaffe          ED Provider  Electronically Signed by           Dexter Amaro DO  10/04/22 2027

## 2022-10-05 LAB
ATRIAL RATE: 75 BPM
QRS AXIS: 26 DEGREES
QRSD INTERVAL: 96 MS
QT INTERVAL: 388 MS
QTC INTERVAL: 442 MS
T WAVE AXIS: 14 DEGREES
VENTRICULAR RATE: 78 BPM

## 2022-10-05 PROCEDURE — 93010 ELECTROCARDIOGRAM REPORT: CPT | Performed by: INTERNAL MEDICINE

## 2022-10-05 NOTE — ED NOTES
Transportation information:    Pt scheduled for pickup at 2130 via [facility]    Primary nurse updated     Devendra Flores RN  10/04/22 2026

## 2022-10-11 ENCOUNTER — TELEPHONE (OUTPATIENT)
Dept: FAMILY MEDICINE CLINIC | Facility: CLINIC | Age: 87
End: 2022-10-11

## 2022-10-19 DIAGNOSIS — R21 RASH: ICD-10-CM

## 2022-10-19 RX ORDER — CLOTRIMAZOLE AND BETAMETHASONE DIPROPIONATE 10; .64 MG/G; MG/G
CREAM TOPICAL
Qty: 60 G | Refills: 2 | Status: SHIPPED | OUTPATIENT
Start: 2022-10-19

## 2022-12-21 ENCOUNTER — TELEPHONE (OUTPATIENT)
Dept: FAMILY MEDICINE CLINIC | Facility: CLINIC | Age: 87
End: 2022-12-21

## 2022-12-21 NOTE — TELEPHONE ENCOUNTER
Please see nursing home visit where it states the patient is in an assisted living facility   Please remove PCP if appropriate   Pedro Staples, CMA

## 2022-12-28 NOTE — TELEPHONE ENCOUNTER
12/28/22 3:50 PM     The office's request has been received, reviewed, and the patient chart updated  The PCP has successfully been removed with a patient attribution note  This message will now be completed      Thank you  Nile Lopez

## 2023-01-12 DIAGNOSIS — R21 RASH: ICD-10-CM

## 2023-01-12 RX ORDER — CLOTRIMAZOLE AND BETAMETHASONE DIPROPIONATE 10; .64 MG/G; MG/G
CREAM TOPICAL
Qty: 60 G | Refills: 2 | Status: SHIPPED | OUTPATIENT
Start: 2023-01-12

## 2023-01-13 DIAGNOSIS — I10 ESSENTIAL HYPERTENSION: ICD-10-CM

## 2023-01-14 RX ORDER — HYDROCHLOROTHIAZIDE 12.5 MG/1
TABLET ORAL
Qty: 30 TABLET | Refills: 11 | Status: SHIPPED | OUTPATIENT
Start: 2023-01-14

## 2023-01-14 RX ORDER — LISINOPRIL 40 MG/1
TABLET ORAL
Qty: 30 TABLET | Refills: 11 | Status: SHIPPED | OUTPATIENT
Start: 2023-01-14

## 2023-02-22 DIAGNOSIS — R21 RASH: ICD-10-CM

## 2023-02-22 DIAGNOSIS — F32.4 MAJOR DEPRESSIVE DISORDER WITH SINGLE EPISODE, IN PARTIAL REMISSION (HCC): ICD-10-CM

## 2023-02-22 RX ORDER — ESCITALOPRAM OXALATE 10 MG/1
TABLET ORAL
Qty: 30 TABLET | Refills: 11 | Status: SHIPPED | OUTPATIENT
Start: 2023-02-22

## 2023-02-22 RX ORDER — CLOTRIMAZOLE AND BETAMETHASONE DIPROPIONATE 10; .64 MG/G; MG/G
CREAM TOPICAL
Qty: 60 G | Refills: 2 | Status: SHIPPED | OUTPATIENT
Start: 2023-02-22

## 2023-03-20 ENCOUNTER — APPOINTMENT (EMERGENCY)
Dept: CT IMAGING | Facility: HOSPITAL | Age: 88
End: 2023-03-20

## 2023-03-20 ENCOUNTER — HOSPITAL ENCOUNTER (EMERGENCY)
Facility: HOSPITAL | Age: 88
Discharge: NURSING, REHAB OR LONG-TERM CARE | End: 2023-03-20
Attending: EMERGENCY MEDICINE | Admitting: EMERGENCY MEDICINE

## 2023-03-20 ENCOUNTER — APPOINTMENT (EMERGENCY)
Dept: RADIOLOGY | Facility: HOSPITAL | Age: 88
End: 2023-03-20

## 2023-03-20 VITALS
DIASTOLIC BLOOD PRESSURE: 94 MMHG | RESPIRATION RATE: 18 BRPM | TEMPERATURE: 97.8 F | BODY MASS INDEX: 28.39 KG/M2 | HEART RATE: 92 BPM | WEIGHT: 167.99 LBS | SYSTOLIC BLOOD PRESSURE: 139 MMHG | OXYGEN SATURATION: 96 %

## 2023-03-20 DIAGNOSIS — R55 SYNCOPE: Primary | ICD-10-CM

## 2023-03-20 LAB
2HR DELTA HS TROPONIN: -1 NG/L
ALBUMIN SERPL BCP-MCNC: 3.7 G/DL (ref 3.5–5)
ALP SERPL-CCNC: 105 U/L (ref 34–104)
ALT SERPL W P-5'-P-CCNC: 11 U/L (ref 7–52)
ANION GAP SERPL CALCULATED.3IONS-SCNC: 7 MMOL/L (ref 4–13)
APTT PPP: 36 SECONDS (ref 23–37)
AST SERPL W P-5'-P-CCNC: 18 U/L (ref 13–39)
ATRIAL RATE: 101 BPM
BASOPHILS # BLD AUTO: 0.07 THOUSANDS/ÂΜL (ref 0–0.1)
BASOPHILS NFR BLD AUTO: 1 % (ref 0–1)
BILIRUB SERPL-MCNC: 0.81 MG/DL (ref 0.2–1)
BUN SERPL-MCNC: 12 MG/DL (ref 5–25)
CALCIUM SERPL-MCNC: 10.5 MG/DL (ref 8.4–10.2)
CARDIAC TROPONIN I PNL SERPL HS: 4 NG/L
CARDIAC TROPONIN I PNL SERPL HS: 5 NG/L
CHLORIDE SERPL-SCNC: 102 MMOL/L (ref 96–108)
CO2 SERPL-SCNC: 27 MMOL/L (ref 21–32)
CREAT SERPL-MCNC: 0.56 MG/DL (ref 0.6–1.3)
EOSINOPHIL # BLD AUTO: 0.3 THOUSAND/ÂΜL (ref 0–0.61)
EOSINOPHIL NFR BLD AUTO: 4 % (ref 0–6)
ERYTHROCYTE [DISTWIDTH] IN BLOOD BY AUTOMATED COUNT: 16.1 % (ref 11.6–15.1)
GFR SERPL CREATININE-BSD FRML MDRD: 83 ML/MIN/1.73SQ M
GLUCOSE SERPL-MCNC: 94 MG/DL (ref 65–140)
HCT VFR BLD AUTO: 45.3 % (ref 34.8–46.1)
HGB BLD-MCNC: 14.3 G/DL (ref 11.5–15.4)
IMM GRANULOCYTES # BLD AUTO: 0.03 THOUSAND/UL (ref 0–0.2)
IMM GRANULOCYTES NFR BLD AUTO: 0 % (ref 0–2)
INR PPP: 2.49 (ref 0.84–1.19)
LIPASE SERPL-CCNC: 10 U/L (ref 11–82)
LYMPHOCYTES # BLD AUTO: 2.41 THOUSANDS/ÂΜL (ref 0.6–4.47)
LYMPHOCYTES NFR BLD AUTO: 30 % (ref 14–44)
MAGNESIUM SERPL-MCNC: 1.9 MG/DL (ref 1.9–2.7)
MCH RBC QN AUTO: 29.2 PG (ref 26.8–34.3)
MCHC RBC AUTO-ENTMCNC: 31.6 G/DL (ref 31.4–37.4)
MCV RBC AUTO: 92 FL (ref 82–98)
MONOCYTES # BLD AUTO: 0.68 THOUSAND/ÂΜL (ref 0.17–1.22)
MONOCYTES NFR BLD AUTO: 9 % (ref 4–12)
NEUTROPHILS # BLD AUTO: 4.44 THOUSANDS/ÂΜL (ref 1.85–7.62)
NEUTS SEG NFR BLD AUTO: 56 % (ref 43–75)
NRBC BLD AUTO-RTO: 0 /100 WBCS
PLATELET # BLD AUTO: 267 THOUSANDS/UL (ref 149–390)
PMV BLD AUTO: 12 FL (ref 8.9–12.7)
POTASSIUM SERPL-SCNC: 4.1 MMOL/L (ref 3.5–5.3)
PROT SERPL-MCNC: 6.9 G/DL (ref 6.4–8.4)
PROTHROMBIN TIME: 27.2 SECONDS (ref 11.6–14.5)
QRS AXIS: 44 DEGREES
QRSD INTERVAL: 92 MS
QT INTERVAL: 398 MS
QTC INTERVAL: 459 MS
RBC # BLD AUTO: 4.9 MILLION/UL (ref 3.81–5.12)
SODIUM SERPL-SCNC: 136 MMOL/L (ref 135–147)
T WAVE AXIS: 30 DEGREES
VENTRICULAR RATE: 80 BPM
WBC # BLD AUTO: 7.93 THOUSAND/UL (ref 4.31–10.16)

## 2023-03-20 NOTE — ED PROVIDER NOTES
History  Chief Complaint   Patient presents with   • Medical Problem     Per EMS pt from [facility]  EMS states that per staff pt had a witnessed seizure that lasted for approx 1 minute  Pt does not have a seizure hx  EMS reports that there was no post ictal period  This is an 42-year-old female with a relevant past medical history of atrial fibrillation on Coumadin, hypertension, hyperlipidemia, Alzheimer's dementia, resident of skilled nursing facility, presenting to the ED today for loss of consciousness  Apparently patient was in her normal state of health when she started to have some gagging, dry heaving, and all of a sudden she lost consciousness  Shortly after losing consciousness she had some full body shakes which lasted approximately 20 to 30 seconds  She denies any pain anywhere in her body at this point time  She also upon waking up did not have a period of confusion, though she did complain of a headache  She did not fall, did not hit her head, she was sitting in bed  She otherwise is not able to provide us with any history at this point time, aside from the fact that she does not have any pain anywhere and does not have any complaints  Prior to Admission Medications   Prescriptions Last Dose Informant Patient Reported? Taking?    POTASSIUM CHLORIDE ER PO   Yes No   Sig: Take 20 mEq by mouth daily   acetaminophen (TYLENOL) 325 mg tablet   No No   Sig: TAKE ONE TABLET BY MOUTH EVERY FOUR HOURS AS NEEDED FOR PAIN / TEMP   amLODIPine (NORVASC) 5 mg tablet   Yes No   Sig: TAKE ONE TABLET BY MOUTH DAILY IN THE MORNING DX  HTN   atorvastatin (LIPITOR) 20 mg tablet   No No   Sig: Take 1 tablet (20 mg total) by mouth daily   clotrimazole-betamethasone (LOTRISONE) 1-0 05 % cream   No No   Sig: APPLY TO RASH TWICE DAILY AS NEEDED DX: RASH   cyanocobalamin (VITAMIN B-12) 1,000 mcg tablet   No No   Sig: Take 1 tablet (1,000 mcg total) by mouth daily   donepezil (ARICEPT) 10 mg tablet   No No Sig: Take 1 tablet (10 mg total) by mouth daily at bedtime   escitalopram (LEXAPRO) 10 mg tablet   No No   Sig: TAKE ONE TABLET BY MOUTH DAILY DX:MOOD   hydrochlorothiazide (HYDRODIURIL) 12 5 mg tablet   No No   Sig: TAKE ONE TABLET BY MOUTH DAILY IN THE MORNING HTN   lisinopril (ZESTRIL) 40 mg tablet   No No   Sig: TAKE ONE TABLET BY MOUTH DAILY DX: HTN   metoprolol tartrate (LOPRESSOR) 25 mg tablet   No No   Sig: Take 1 tablet (25 mg total) by mouth 2 (two) times a day   warfarin (COUMADIN) 3 mg tablet   Yes No   Sig: Take by mouth daily      Facility-Administered Medications: None       Past Medical History:   Diagnosis Date   • Essential hypertension 4/11/2021   • Late onset [facility]'s [facility] without [facility] disturbance [facility] ) 4/11/2021   • Mixed hyperlipidemia 4/11/2021   • Persistent atrial fibrillation ([facility] ) 4/11/2021       Past Surgical History:   Procedure Laterality Date   • APPENDECTOMY     • CATARACT EXTRACTION, BILATERAL Bilateral    • EYE SURGERY      Cataract   • HIATAL HERNIA REPAIR      Anterior Gastropexy   • RENAL MASS EXCISION Right     Cryosurgical Ablation   • TONSILECTOMY AND ADNOIDECTOMY     • TONSILLECTOMY         Family History   Problem Relation Age of Onset   • Dementia Mother    • Heart disease Brother         Arteriosclerotic Cardiovascular     I have reviewed and agree with the history as documented  E-Cigarette/Vaping     E-Cigarette/Vaping Substances     Social History     Tobacco Use   • Smoking status: Never   • Smokeless tobacco: Never   Substance Use Topics   • Alcohol use: Not Currently   • Drug use: No       Review of Systems   Unable to perform ROS: Dementia   Respiratory: Negative for shortness of breath  Cardiovascular: Negative for chest pain  Gastrointestinal: Negative for abdominal pain and nausea  Physical Exam  Physical Exam  Vitals and nursing note reviewed  Constitutional:       General: She is not in acute distress       Appearance: Normal appearance  She is obese  She is not ill-appearing  HENT:      Head: Normocephalic and atraumatic  Right Ear: External ear normal       Left Ear: External ear normal       Nose: Nose normal  No congestion or rhinorrhea  Mouth/Throat:      Mouth: Mucous membranes are moist       Pharynx: Oropharynx is clear  No oropharyngeal exudate  Eyes:      General: No scleral icterus  Conjunctiva/sclera: Conjunctivae normal    Cardiovascular:      Rate and Rhythm: Normal rate and regular rhythm  Pulses: Normal pulses  Heart sounds: Normal heart sounds  No murmur heard  No gallop  Pulmonary:      Effort: Pulmonary effort is normal  No respiratory distress  Breath sounds: Normal breath sounds  No stridor  No wheezing or rhonchi  Abdominal:      General: Abdomen is flat  Bowel sounds are normal  There is no distension  Palpations: Abdomen is soft  There is no mass  Tenderness: There is no abdominal tenderness  Musculoskeletal:         General: No swelling or tenderness  Normal range of motion  Cervical back: Normal range of motion and neck supple  No tenderness  Right lower leg: No edema  Left lower leg: No edema  Skin:     General: Skin is warm and dry  Capillary Refill: Capillary refill takes less than 2 seconds  Coloration: Skin is not jaundiced  Findings: No bruising  Neurological:      General: No focal deficit present  Mental Status: She is alert  Mental status is at baseline  She is disoriented  Sensory: No sensory deficit  Motor: No weakness           Vital Signs  ED Triage Vitals [03/20/23 0704]   Temperature Pulse Respirations Blood Pressure SpO2   97 8 °F (36 6 °C) 71 18 (!) 152/107 95 %      Temp Source Heart Rate Source Patient Position - Orthostatic VS BP Location FiO2 (%)   Oral Monitor Sitting Right arm --      Pain Score       No Pain           Vitals:    03/20/23 0704   BP: (!) 152/107   Pulse: 71   Patient Position - Orthostatic VS: Sitting         Visual Acuity      ED Medications  Medications - No data to display    Diagnostic Studies  Results Reviewed     None                 No orders to display              Procedures  Procedures         ED Course                                             Medical Decision Making  This was an 80-year-old female patient presenting to the ED for loss of consciousness,  She does have a history of atrial fibrillation, and is on Coumadin currently, and per history report patient was in her normal state of health, in bed, talking to her roommate when she developed some nausea, and started dry heaving  Shortly thereafter patient lost consciousness, had 1/22 period of shaking of her extremities, which resolved spontaneously  She did not have any return of her symptoms  She did not have any other significantly related complaints  She awoke shortly after, and did not remember the episode  Patient otherwise does not have any significant complaints at this time  She does have history of dementia  Her history is corroborated by her skilled nursing facility aide who we called and spoke with here from the ED  Her exam for the most part is unremarkable  She does not exhibit any evidence of trauma  Her differential diagnosis includes: Syncope with myoclonus due to vasovagal versus arrhythmia versus seizure versus other  Patient does not have any history of seizure previously  Her blood work was interpreted by myself and included, CBC, metabolic panel, she also underwent a chest x-ray all of which were unremarkable  She had a lipase which was normal as well  She underwent a CT of her head which did not show any significant abnormalities    I did speak with her, we spoke with skilled nursing facility [facility], and patient was requesting to be discharged, did not want to stay any further, especially with her history of vomiting, I am comfortable calling this a vasovagal syncope, and I do not heavily suspect an arrhythmia, or symptomatic aortic stenosis  Patient is well appearing and stable for discharge  Discussed return precautions and patient expressed understanding  Patient will follow up with PCP as directed and return to ED sooner if worsening or persistent symptoms  Amount and/or Complexity of Data Reviewed  Labs: ordered  Radiology: ordered  Disposition  Final diagnoses:   None     ED Disposition     None      Follow-up Information    None         Patient's Medications   Discharge Prescriptions    No medications on file       No discharge procedures on file      PDMP Review       Value Time User    PDMP Reviewed  Yes 3/20/2021 10:10 AM FARHAT Green          ED Provider  Electronically Signed by           Roma Graves MD  03/20/23 2873

## 2023-03-20 NOTE — DISCHARGE INSTRUCTIONS
You were seen in the ED for loss of consciousness  You had blood work, CT scan, EKG all showing no significant abnormalities  You were diagnosed with likely vasovagal syncope  Please follow up with your primary care physician within the next 1 week for continued management of your conditions  Please come back to the ED if you develop uncontrollable pain, return of symptoms, loss of consciousness  Thank you very much for utilizing the ED this morning

## 2023-03-21 ENCOUNTER — APPOINTMENT (EMERGENCY)
Dept: CT IMAGING | Facility: HOSPITAL | Age: 88
End: 2023-03-21

## 2023-03-21 ENCOUNTER — HOSPITAL ENCOUNTER (OUTPATIENT)
Facility: HOSPITAL | Age: 88
Setting detail: OBSERVATION
Discharge: NURSING, REHAB OR LONG-TERM CARE | End: 2023-03-22
Attending: EMERGENCY MEDICINE | Admitting: INTERNAL MEDICINE

## 2023-03-21 ENCOUNTER — APPOINTMENT (EMERGENCY)
Dept: RADIOLOGY | Facility: HOSPITAL | Age: 88
End: 2023-03-21

## 2023-03-21 DIAGNOSIS — R55 SYNCOPE: Primary | ICD-10-CM

## 2023-03-21 LAB
2HR DELTA HS TROPONIN: 0 NG/L
ALBUMIN SERPL BCP-MCNC: 3.6 G/DL (ref 3.5–5)
ALP SERPL-CCNC: 102 U/L (ref 34–104)
ALT SERPL W P-5'-P-CCNC: 11 U/L (ref 7–52)
ANION GAP SERPL CALCULATED.3IONS-SCNC: 6 MMOL/L (ref 4–13)
ANISOCYTOSIS BLD QL SMEAR: PRESENT
AST SERPL W P-5'-P-CCNC: 17 U/L (ref 13–39)
ATRIAL RATE: 75 BPM
BASOPHILS # BLD MANUAL: 0.08 THOUSAND/UL (ref 0–0.1)
BASOPHILS NFR MAR MANUAL: 1 % (ref 0–1)
BILIRUB SERPL-MCNC: 0.67 MG/DL (ref 0.2–1)
BUN SERPL-MCNC: 11 MG/DL (ref 5–25)
CALCIUM SERPL-MCNC: 10.1 MG/DL (ref 8.4–10.2)
CARDIAC TROPONIN I PNL SERPL HS: 5 NG/L
CARDIAC TROPONIN I PNL SERPL HS: 5 NG/L
CHLORIDE SERPL-SCNC: 101 MMOL/L (ref 96–108)
CO2 SERPL-SCNC: 29 MMOL/L (ref 21–32)
CREAT SERPL-MCNC: 0.58 MG/DL (ref 0.6–1.3)
EOSINOPHIL # BLD MANUAL: 0.17 THOUSAND/UL (ref 0–0.4)
EOSINOPHIL NFR BLD MANUAL: 2 % (ref 0–6)
ERYTHROCYTE [DISTWIDTH] IN BLOOD BY AUTOMATED COUNT: 16.3 % (ref 11.6–15.1)
GFR SERPL CREATININE-BSD FRML MDRD: 82 ML/MIN/1.73SQ M
GLUCOSE SERPL-MCNC: 85 MG/DL (ref 65–140)
HCT VFR BLD AUTO: 43.2 % (ref 34.8–46.1)
HGB BLD-MCNC: 14 G/DL (ref 11.5–15.4)
INR PPP: 2.86 (ref 0.84–1.19)
LG PLATELETS BLD QL SMEAR: PRESENT
LYMPHOCYTES # BLD AUTO: 2.11 THOUSAND/UL (ref 0.6–4.47)
LYMPHOCYTES # BLD AUTO: 25 % (ref 14–44)
MCH RBC QN AUTO: 29.7 PG (ref 26.8–34.3)
MCHC RBC AUTO-ENTMCNC: 32.4 G/DL (ref 31.4–37.4)
MCV RBC AUTO: 92 FL (ref 82–98)
MONOCYTES # BLD AUTO: 0.76 THOUSAND/UL (ref 0–1.22)
MONOCYTES NFR BLD: 9 % (ref 4–12)
NEUTROPHILS # BLD MANUAL: 5.31 THOUSAND/UL (ref 1.85–7.62)
NEUTS SEG NFR BLD AUTO: 63 % (ref 43–75)
PLATELET # BLD AUTO: 257 THOUSANDS/UL (ref 149–390)
PLATELET BLD QL SMEAR: ADEQUATE
PMV BLD AUTO: 11.6 FL (ref 8.9–12.7)
POTASSIUM SERPL-SCNC: 4.7 MMOL/L (ref 3.5–5.3)
PROT SERPL-MCNC: 6.7 G/DL (ref 6.4–8.4)
PROTHROMBIN TIME: 30.3 SECONDS (ref 11.6–14.5)
QRS AXIS: 41 DEGREES
QRSD INTERVAL: 92 MS
QT INTERVAL: 366 MS
QTC INTERVAL: 447 MS
RBC # BLD AUTO: 4.71 MILLION/UL (ref 3.81–5.12)
RBC MORPH BLD: PRESENT
SODIUM SERPL-SCNC: 136 MMOL/L (ref 135–147)
T WAVE AXIS: -7 DEGREES
VENTRICULAR RATE: 90 BPM
WBC # BLD AUTO: 8.43 THOUSAND/UL (ref 4.31–10.16)

## 2023-03-21 RX ORDER — LISINOPRIL 20 MG/1
40 TABLET ORAL DAILY
Status: DISCONTINUED | OUTPATIENT
Start: 2023-03-22 | End: 2023-03-22 | Stop reason: HOSPADM

## 2023-03-21 RX ORDER — ONDANSETRON 2 MG/ML
4 INJECTION INTRAMUSCULAR; INTRAVENOUS EVERY 6 HOURS PRN
Status: DISCONTINUED | OUTPATIENT
Start: 2023-03-21 | End: 2023-03-22 | Stop reason: HOSPADM

## 2023-03-21 RX ORDER — ESCITALOPRAM OXALATE 10 MG/1
10 TABLET ORAL DAILY
Status: DISCONTINUED | OUTPATIENT
Start: 2023-03-22 | End: 2023-03-22 | Stop reason: HOSPADM

## 2023-03-21 RX ORDER — ENOXAPARIN SODIUM 100 MG/ML
40 INJECTION SUBCUTANEOUS DAILY
Status: DISCONTINUED | OUTPATIENT
Start: 2023-03-21 | End: 2023-03-21

## 2023-03-21 RX ORDER — DOCUSATE SODIUM 100 MG/1
100 CAPSULE, LIQUID FILLED ORAL 2 TIMES DAILY PRN
Status: DISCONTINUED | OUTPATIENT
Start: 2023-03-21 | End: 2023-03-22 | Stop reason: HOSPADM

## 2023-03-21 RX ORDER — ACETAMINOPHEN 325 MG/1
650 TABLET ORAL EVERY 6 HOURS PRN
Status: DISCONTINUED | OUTPATIENT
Start: 2023-03-21 | End: 2023-03-22 | Stop reason: HOSPADM

## 2023-03-21 RX ORDER — HYDROCHLOROTHIAZIDE 12.5 MG/1
12.5 TABLET ORAL DAILY
Status: CANCELLED | OUTPATIENT
Start: 2023-03-22

## 2023-03-21 RX ORDER — DONEPEZIL HYDROCHLORIDE 5 MG/1
10 TABLET, FILM COATED ORAL
Status: DISCONTINUED | OUTPATIENT
Start: 2023-03-21 | End: 2023-03-22 | Stop reason: HOSPADM

## 2023-03-21 RX ORDER — MAGNESIUM HYDROXIDE/ALUMINUM HYDROXICE/SIMETHICONE 120; 1200; 1200 MG/30ML; MG/30ML; MG/30ML
30 SUSPENSION ORAL EVERY 6 HOURS PRN
Status: DISCONTINUED | OUTPATIENT
Start: 2023-03-21 | End: 2023-03-22 | Stop reason: HOSPADM

## 2023-03-21 RX ORDER — AMLODIPINE BESYLATE 5 MG/1
5 TABLET ORAL DAILY
Status: CANCELLED | OUTPATIENT
Start: 2023-03-22

## 2023-03-21 RX ORDER — WARFARIN SODIUM 2.5 MG/1
2.5 TABLET ORAL
Status: DISCONTINUED | OUTPATIENT
Start: 2023-03-21 | End: 2023-03-22 | Stop reason: HOSPADM

## 2023-03-21 RX ORDER — ATORVASTATIN CALCIUM 20 MG/1
20 TABLET, FILM COATED ORAL DAILY
Status: DISCONTINUED | OUTPATIENT
Start: 2023-03-21 | End: 2023-03-22 | Stop reason: HOSPADM

## 2023-03-21 RX ADMIN — SODIUM CHLORIDE 1000 ML: 0.9 INJECTION, SOLUTION INTRAVENOUS at 14:39

## 2023-03-21 NOTE — H&P
[facility]  H&P- Antonio Mayes 1935, 80 y o  female MRN: [id number]  Unit/Bed#: ED-09 Encounter: [id number]  Primary Care Provider: No primary care provider on file  Date and time admitted to hospital: 3/21/2023 11:04 AM    * Syncope  Assessment & Plan  PT presents w/ 2x episodes of syncope in last 24hrs  Orthostatic vs vasovagal vs cardiac    PLAN  EVAL  24 hour tele  ECHO  TXT  Hold HCTZ and amlodipine  Check INR  Orthostats prior to fluids then x3  Vitals q  shift   TXT  IV NS 1000 ml bolus  Continue lisinopril and metoprolol home medications        Aortic stenosis, mild  Assessment & Plan  Last ECHO [id number]    PLAN:  Repeat ECHO      Atrial fibrillation ([address] )  Assessment & Plan  Present on admission  Currently a fib rate controlled 90 bpm    PLAN  Continue amlodipine and metoprolol home txt    Sick sinus syndrome [facility])  Assessment & Plan  Present on admission    PLAN:  Continue home medications  Benign essential hypertension  Assessment & Plan  Present on admission    PLAN:  Continue home medications  Hyperlipidemia, mixed  Assessment & Plan  Present on admission    PLAN:  Continue home medications  Late onset Alzheimer's disease without behavioral disturbance [facility])  Assessment & Plan  Present on admission    PLAN:  Continue home medications  VTE Pharmacologic Prophylaxis: VTE Score: 6 High Risk (Score >/= 5) - Pharmacological DVT Prophylaxis Ordered: enoxaparin (Lovenox)  Sequential Compression Devices Ordered  Code Status: Level 3 - DNAR and DNI Code 3 per ACPs from nursing home  Discussion with family: Attempted to update  (niece) via phone  Unable to contact  Mailbox was full unable to leave message    Anticipated Length of Stay: Patient will be admitted on an observation basis with an anticipated length of stay of less than 2 midnights secondary to syncope      Chief Complaint: Syncope    History of Present Illness:  81yo female presented to ED 21MAR23 due to syncopal event  From [facility] in [place]  PMHX: HTN, HLD, Persistent afib, late alzheimers significant AS, sick sinus syndrome  HPI: Pt does note communicate well  Per nursing home, pt was seated syncopized and slid down chair  Head strike unk  Similar episode yesterday was brought and D/Fan same day from ED  DENIES: CP, SOB  ENDORSES: Ankle pain   Review of Systems:  Review of Systems   Unable to perform ROS: Dementia       Past Medical and Surgical History:   Past Medical History:   Diagnosis Date   • Essential hypertension 4/11/2021   • Late onset Alzheimer's disease without behavioral disturbance ([facility] ) 4/11/2021   • Mixed hyperlipidemia 4/11/2021   • Persistent atrial fibrillation ([facility] ) 4/11/2021       Past Surgical History:   Procedure Laterality Date   • APPENDECTOMY     • CATARACT EXTRACTION, BILATERAL Bilateral    • EYE SURGERY      Cataract   • HIATAL HERNIA REPAIR      Anterior Gastropexy   • RENAL MASS EXCISION Right     Cryosurgical Ablation   • TONSILECTOMY AND ADNOIDECTOMY     • TONSILLECTOMY         Meds/Allergies:  Prior to Admission medications    Medication Sig Start Date End Date Taking?  Authorizing Provider   acetaminophen (TYLENOL) 325 mg tablet TAKE ONE TABLET BY MOUTH EVERY FOUR HOURS AS NEEDED FOR PAIN / TEMP 7/13/22   Yesenia Meraz MD   amLODIPine (NORVASC) 5 mg tablet TAKE ONE TABLET BY MOUTH DAILY IN THE MORNING DX  HTN 6/29/22   Historical Provider, MD   atorvastatin (LIPITOR) 20 mg tablet Take 1 tablet (20 mg total) by mouth daily 6/28/18   Dinorah Dang MD   clotrimazole-betamethasone (LOTRISONE) 1-0 05 % cream APPLY TO RASH TWICE DAILY AS NEEDED DX: RASH 2/22/23   Yesenia Meraz MD   cyanocobalamin (VITAMIN B-12) 1,000 mcg tablet Take 1 tablet (1,000 mcg total) by mouth daily 6/28/18   Dinorah Dang MD   donepezil (ARICEPT) 10 mg tablet Take 1 tablet (10 mg total) by mouth daily at bedtime 6/28/18   Dinorah Dang MD   escitalopram Kellie Higgins) 10 mg tablet TAKE ONE TABLET BY MOUTH DAILY DX:MOOD 2/22/23   Isidro Aleman MD   hydrochlorothiazide (HYDRODIURIL) 12 5 mg tablet TAKE ONE TABLET BY MOUTH DAILY IN THE MORNING HTN 1/14/23   Isidro Aleman MD   lisinopril (ZESTRIL) 40 mg tablet TAKE ONE TABLET BY MOUTH DAILY DX: HTN 1/14/23   Isidro Aleman MD   metoprolol tartrate (LOPRESSOR) 25 mg tablet Take 1 tablet (25 mg total) by mouth 2 (two) times a day 6/28/18   Jamil Maloney MD   POTASSIUM CHLORIDE ER PO Take 20 mEq by mouth daily    Historical Provider, MD   warfarin (COUMADIN) 3 mg tablet Take by mouth daily    Historical Provider, MD     I have reveiwed home medications using records provided by [facility]  Allergies: No Known Allergies    Social History:  Marital Status: Single   Occupation: Retired  Patient Pre-hospital Living Situation: [facility]: [address] [place] [address]  Patient Pre-hospital Level of Mobility: walks  Patient Pre-hospital Diet Restrictions: None  Substance Use History:   Social History     Substance and Sexual Activity   Alcohol Use Not Currently     Social History     Tobacco Use   Smoking Status Never   Smokeless Tobacco Never     Social History     Substance and Sexual Activity   Drug Use No       Family History:  Family History   Problem Relation Age of Onset   • Dementia Mother    • Heart disease Brother         Arteriosclerotic Cardiovascular       Physical Exam:     Vitals:   Blood Pressure: 123/78 (03/21/23 1400)  Pulse: 98 (03/21/23 1400)  Temperature: 98 7 °F (37 1 °C) (03/21/23 1112)  Respirations: 16 (03/21/23 1400)  SpO2: 98 % (03/21/23 1400)    Physical Exam  Vitals and nursing note reviewed  Constitutional:       Appearance: She is obese  HENT:      Head: Normocephalic and atraumatic  Right Ear: External ear normal       Left Ear: External ear normal       Nose: Nose normal  No congestion or rhinorrhea  Mouth/Throat:      Mouth: Mucous membranes are moist       Pharynx: Oropharynx is clear   No oropharyngeal exudate or posterior oropharyngeal erythema  Eyes:      General: No scleral icterus  Right eye: No discharge  Left eye: No discharge  Extraocular Movements: Extraocular movements intact  Conjunctiva/sclera: Conjunctivae normal    Cardiovascular:      Rate and Rhythm: Normal rate  Rhythm irregular  Pulses: Normal pulses  Pulmonary:      Effort: No respiratory distress  Breath sounds: Normal breath sounds  No wheezing or rales  Abdominal:      General: There is no distension  Palpations: Abdomen is soft  Tenderness: There is no abdominal tenderness  There is no guarding or rebound  Musculoskeletal:      Cervical back: Normal range of motion and neck supple  No rigidity or tenderness  Right lower leg: No edema  Left lower leg: No edema  Lymphadenopathy:      Cervical: No cervical adenopathy  Skin:     Capillary Refill: Capillary refill takes 2 to 3 seconds  Coloration: Skin is not jaundiced  Neurological:      Mental Status: She is alert  She is disoriented            Additional Data:     Lab Results:  Results from last 7 days   Lab Units 03/21/23  1209 03/20/23  0715   WBC Thousand/uL 8 43 7 93   HEMOGLOBIN g/dL 14 0 14 3   HEMATOCRIT % 43 2 45 3   PLATELETS Thousands/uL 257 267   NEUTROS PCT %  --  56   LYMPHS PCT %  --  30   LYMPHO PCT % 25  --    MONOS PCT %  --  9   MONO PCT % 9  --    EOS PCT % 2 4     Results from last 7 days   Lab Units 03/21/23  1209   SODIUM mmol/L 136   POTASSIUM mmol/L 4 7   CHLORIDE mmol/L 101   CO2 mmol/L 29   BUN mg/dL 11   CREATININE mg/dL 0 58*   ANION GAP mmol/L 6   CALCIUM mg/dL 10 1   ALBUMIN g/dL 3 6   TOTAL BILIRUBIN mg/dL 0 67   ALK PHOS U/L 102   ALT U/L 11   AST U/L 17   GLUCOSE RANDOM mg/dL 85     Results from last 7 days   Lab Units 03/20/23  0715   INR  2 49*                   Lines/Drains:  Invasive Devices     Peripheral Intravenous Line  Duration           Peripheral IV 03/21/23 Right Antecubital <1 day                    Imaging: Reviewed radiology reports from this admission including: chest xray and CT head and Personally reviewed the following imaging: chest xray  CT head without contrast   Final Result by Tana 6, DO (03/21 0892)      No acute intracranial abnormality  Chronic microangiopathic changes  Workstation performed: [id number]         XR knee 4+ vw right injury   ED Interpretation by Ross Mena PA-C (03/21 1217)   Arthritis noted; otherwise, no acute osseous abnormalities      XR chest 1 view portable    (Results Pending)   XR ankle 3+ views RIGHT    (Results Pending)       EKG and Other Studies Reviewed on Admission:   · EKG: Atrial fibrillation  HR 90     ** Please Note: This note has been constructed using a voice recognition system   **

## 2023-03-21 NOTE — ASSESSMENT & PLAN NOTE
Present on admission  Currently a fib rate controlled 90 bpm    PLAN  Continue amlodipine and metoprolol home txt

## 2023-03-21 NOTE — ED PROVIDER NOTES
History  Chief Complaint   Patient presents with   • Syncope     Sent from [place] [address] for syncopal episode during lunch  Pt states she did not pass out, "I felt myself going down from the chair and put my hands out " Pt c/o R ankle pain  Denies head strike  Hx dementia  Oriented to self and place, answering questions appropriately at this time  This is an 54-year-old female with past medical history significant for aortic stenosis, persistent atrial fibrillation, hypertension, and Alzheimer's disease presenting to the emergency department today for a suppose a syncopal episode while eating lunch  She was evaluated in the emergency department yesterday for similar episode  Per nursing home staff, the patient had an episode of unresponsiveness at a table while she was sitting earlier today  Per nursing home, the patient slid off the chair but did not strike her head  Slide off the chair was witnessed  There was no associated seizure-like activity  The patient herself notes only right ankle pain but she denies any other symptoms  She denies chest pain or shortness of breath  She denies dizziness, lightheadedness, or visual disturbances  The patient is on Coumadin  The patient denies other complaints at this time  History provided by:  Patient   used: No    Syncope  Episode history:  Single  Most recent episode:   Today  Progression:  Resolved  Chronicity:  New  Witnessed: yes    Relieved by:  Nothing  Worsened by:  Nothing  Ineffective treatments:  None tried  Associated symptoms: confusion (baseline Alzheimer's)    Associated symptoms: no anxiety, no chest pain, no diaphoresis, no difficulty breathing, no dizziness, no fever, no focal sensory loss, no focal weakness, no headaches, no malaise/fatigue, no nausea, no palpitations, no recent fall, no recent injury, no recent surgery, no rectal bleeding, no seizures, no shortness of breath, no visual change, no vomiting and no weakness Prior to Admission Medications   Prescriptions Last Dose Informant Patient Reported? Taking?    POTASSIUM CHLORIDE ER PO Unknown  Yes No   Sig: Take 20 mEq by mouth daily   acetaminophen (TYLENOL) 325 mg tablet Past Week  No Yes   Sig: TAKE ONE TABLET BY MOUTH EVERY FOUR HOURS AS NEEDED FOR PAIN / TEMP   amLODIPine (NORVASC) 5 mg tablet 3/21/2023  Yes Yes   Sig: TAKE ONE TABLET BY MOUTH DAILY IN THE MORNING DX  HTN   atorvastatin (LIPITOR) 20 mg tablet 3/20/2023  No Yes   Sig: Take 1 tablet (20 mg total) by mouth daily   clotrimazole-betamethasone (LOTRISONE) 1-0 05 % cream Unknown  No No   Sig: APPLY TO RASH TWICE DAILY AS NEEDED DX: RASH   cyanocobalamin (VITAMIN B-12) 1,000 mcg tablet Unknown  No No   Sig: Take 1 tablet (1,000 mcg total) by mouth daily   donepezil (ARICEPT) 10 mg tablet 3/20/2023  No Yes   Sig: Take 1 tablet (10 mg total) by mouth daily at bedtime   escitalopram (LEXAPRO) 10 mg tablet 3/21/2023  No Yes   Sig: TAKE ONE TABLET BY MOUTH DAILY DX:MOOD   hydrochlorothiazide (HYDRODIURIL) 12 5 mg tablet 3/21/2023  No Yes   Sig: TAKE ONE TABLET BY MOUTH DAILY IN THE MORNING HTN   lisinopril (ZESTRIL) 40 mg tablet 3/21/2023  No Yes   Sig: TAKE ONE TABLET BY MOUTH DAILY DX: HTN   metoprolol tartrate (LOPRESSOR) 25 mg tablet 3/21/2023  No Yes   Sig: Take 1 tablet (25 mg total) by mouth 2 (two) times a day   warfarin (COUMADIN) 3 mg tablet 3/20/2023  Yes Yes   Sig: Take by mouth daily      Facility-Administered Medications: None       Past Medical History:   Diagnosis Date   • Essential hypertension 4/11/2021   • Late onset Alzheimer's disease without behavioral disturbance ([facility] [address] ) 4/11/2021   • Mixed hyperlipidemia 4/11/2021   • Persistent atrial fibrillation ([facility] [address] ) 4/11/2021       Past Surgical History:   Procedure Laterality Date   • APPENDECTOMY     • CATARACT EXTRACTION, BILATERAL Bilateral    • EYE SURGERY      Cataract   • HIATAL HERNIA REPAIR      Anterior Gastropexy   • RENAL MASS EXCISION Right Cryosurgical Ablation   • TONSILECTOMY AND ADNOIDECTOMY     • TONSILLECTOMY         Family History   Problem Relation Age of Onset   • Dementia Mother    • Heart disease Brother         Arteriosclerotic Cardiovascular     I have reviewed and agree with the history as documented  E-Cigarette/Vaping     E-Cigarette/Vaping Substances     Social History     Tobacco Use   • Smoking status: Never   • Smokeless tobacco: Never   Substance Use Topics   • Alcohol use: Not Currently   • Drug use: No       Review of Systems   Constitutional: Negative for appetite change, chills, diaphoresis, fatigue, fever and malaise/fatigue  Eyes: Negative for visual disturbance  Respiratory: Negative for cough, chest tightness, shortness of breath and wheezing  Cardiovascular: Positive for syncope  Negative for chest pain, palpitations and leg swelling  Gastrointestinal: Negative for abdominal pain, constipation, diarrhea, nausea and vomiting  Musculoskeletal: Negative for neck pain and neck stiffness  Skin: Negative for rash and wound  Neurological: Positive for syncope  Negative for dizziness, focal weakness, seizures, weakness, light-headedness, numbness and headaches  Psychiatric/Behavioral: Positive for confusion (baseline Alzheimer's)  Physical Exam  Physical Exam  Vitals and nursing note reviewed  Constitutional:       General: She is not in acute distress  Appearance: Normal appearance  She is obese  She is not ill-appearing, toxic-appearing or diaphoretic  HENT:      Head: Normocephalic and atraumatic  Nose: Nose normal  No congestion or rhinorrhea  Mouth/Throat:      Mouth: Mucous membranes are moist       Pharynx: No oropharyngeal exudate or posterior oropharyngeal erythema  Eyes:      General: No scleral icterus  Right eye: No discharge  Left eye: No discharge  Extraocular Movements: Extraocular movements intact        Pupils: Pupils are equal, round, and reactive to light  Neck:      Comments: Nonmeningeal  Cardiovascular:      Rate and Rhythm: Normal rate  Rhythm irregular  Pulses: Normal pulses  Heart sounds: Normal heart sounds  No murmur heard  No friction rub  No gallop  Pulmonary:      Effort: Pulmonary effort is normal  No respiratory distress  Breath sounds: Normal breath sounds  No stridor  No wheezing, rhonchi or rales  Chest:      Chest wall: No tenderness  Abdominal:      General: Abdomen is flat  There is no distension  Palpations: Abdomen is soft  Tenderness: There is no abdominal tenderness  There is no right CVA tenderness, left CVA tenderness, guarding or rebound  Comments: Abdomen is soft, nontender, nondistended, without organomegaly   Musculoskeletal:         General: Normal range of motion  Cervical back: Normal range of motion  No tenderness  Right lower leg: No edema  Left lower leg: No edema  Comments: The patient has no tenderness to palpation to her bilateral hips, thighs, knees, shins, or ankles; she moves her bilateral lower extremities independently without difficulty   Skin:     General: Skin is warm and dry  Capillary Refill: Capillary refill takes less than 2 seconds  Coloration: Skin is not jaundiced or pale  Neurological:      General: No focal deficit present  Mental Status: She is alert  Mental status is at baseline        Comments: 5/5 strength in bilateral upper and lower extremities  Normal sensation of bilateral upper and lower extremities  The patient is able to smile, frown, puff out cheeks, and raise eyebrows bilaterally symmetrically without difficulty  Normal finger-to-nose examination  The patient is alert to person and place but not time or month  Overall, a normal neurologic assessment   Psychiatric:         Mood and Affect: Mood normal          Behavior: Behavior normal          Vital Signs  ED Triage Vitals [03/21/23 1112]   Temperature Pulse Respirations Blood Pressure SpO2   98 7 °F (37 1 °C) 89 19 126/79 98 %      Temp src Heart Rate Source Patient Position - Orthostatic VS BP Location FiO2 (%)   -- -- -- -- --      Pain Score       --           Vitals:    03/21/23 1130 03/21/23 1200 03/21/23 1300 03/21/23 1400   BP: 134/86 138/72 133/84 123/78   Pulse: 91 97 95 98         Visual Acuity      ED Medications  Medications   sodium chloride 0 9 % bolus 1,000 mL (1,000 mL Intravenous New Bag 3/21/23 1439)       Diagnostic Studies  Results Reviewed     Procedure Component Value Units Date/Time    HS Troponin I 2hr [[id number]] Collected: 03/21/23 1438    Lab Status: In process Specimen: Blood from Arm, Right Updated: 03/21/23 [address] [[id number]] Collected: 03/21/23 1438    Lab Status: In process Specimen: Blood from Arm, Right Updated: 03/21/23 1443    HS Troponin I 4hr [[id number]]     Lab Status: No result Specimen: Blood     CBC and differential [[id number]]  (Abnormal) Collected: 03/21/23 1209    Lab Status: Final result Specimen: Blood from Arm, Right Updated: 03/21/23 1325     WBC 8 43 Thousand/uL      RBC 4 71 Million/uL      Hemoglobin 14 0 g/dL      Hematocrit 43 2 %      MCV 92 fL      MCH 29 7 pg      MCHC 32 4 g/dL      RDW 16 3 %      MPV 11 6 fL      Platelets 988 Thousands/uL     Narrative: This is an appended report  These results have been appended to a previously verified report      Manual Differential(PHLEBS Do Not Order) [[id number]] Collected: 03/21/23 1209    Lab Status: Final result Specimen: Blood from Arm, Right Updated: 03/21/23 1325     Segmented % 63 %      Lymphocytes % 25 %      Monocytes % 9 %      Eosinophils, % 2 %      Basophils % 1 %      Absolute Neutrophils 5 31 Thousand/uL      Lymphocytes Absolute 2 11 Thousand/uL      Monocytes Absolute 0 76 Thousand/uL      Eosinophils Absolute 0 17 Thousand/uL      Basophils Absolute 0 08 Thousand/uL      Total Counted --     RBC Morphology Present Anisocytosis Present     Platelet Estimate Adequate     Large Platelet Present    HS Troponin 0hr (reflex protocol) [[id number]]  (Normal) Collected: 03/21/23 1209    Lab Status: Final result Specimen: Blood from Arm, Right Updated: 03/21/23 1249     hs TnI 0hr 5 ng/L     Comprehensive metabolic panel [[id number]]  (Abnormal) Collected: 03/21/23 1209    Lab Status: Final result Specimen: Blood from Arm, Right Updated: 03/21/23 1244     Sodium 136 mmol/L      Potassium 4 7 mmol/L      Chloride 101 mmol/L      CO2 29 mmol/L      ANION GAP 6 mmol/L      BUN 11 mg/dL      Creatinine 0 58 mg/dL      Glucose 85 mg/dL      Calcium 10 1 mg/dL      AST 17 U/L      ALT 11 U/L      Alkaline Phosphatase 102 U/L      Total Protein 6 7 g/dL      Albumin 3 6 g/dL      Total Bilirubin 0 67 mg/dL      eGFR 82 ml/min/1 73sq m     Narrative:      [place] guidelines for Chronic Kidney Disease (CKD):   •  Stage 1 with normal or high GFR (GFR > 90 mL/min/1 73 square meters)  •  Stage 2 Mild CKD (GFR = 60-89 mL/min/1 73 square meters)  •  Stage 3A Moderate CKD (GFR = 45-59 mL/min/1 73 square meters)  •  Stage 3B Moderate CKD (GFR = 30-44 mL/min/1 73 square meters)  •  Stage 4 Severe CKD (GFR = 15-29 mL/min/1 73 square meters)  •  Stage 5 End Stage CKD (GFR <15 mL/min/1 73 square meters)  Note: GFR calculation is accurate only with a steady state creatinine                 CT head without contrast   Final Result by Tray Servin DO (03/21 1304)      No acute intracranial abnormality  Chronic microangiopathic changes                    Workstation performed: [id number]         XR knee 4+ vw right injury   ED Interpretation by Ross Mena PA-C (03/21 1217)   Arthritis noted; otherwise, no acute osseous abnormalities      XR chest 1 view portable    (Results Pending)   XR ankle 3+ views RIGHT    (Results Pending)              Procedures  ECG 12 Lead Documentation Only    Date/Time: 3/21/2023 12:11 PM  Performed by: Breezy Melgoza PA-C  Authorized by: Breezy Melgoza PA-C     Indications / Diagnosis:  Syncope  Patient location:  ED  Previous ECG:     Previous ECG:  Compared to current    Comparison ECG info:  3/20/2023    Similarity:  No change  Interpretation:     Interpretation: non-specific    Rate:     ECG rate:  90    ECG rate assessment: normal    Rhythm:     Rhythm: atrial fibrillation    Ectopy:     Ectopy: none    QRS:     QRS axis:  Normal  Conduction:     Conduction: normal    ST segments:     ST segments:  Normal  T waves:     T waves: normal    Comments:      Atrial fibrillation with a rate of 90 without any ST segment changes or signs of ischemia  Normal axis  No PVCs or PACs  ED Course                                             Medical Decision Making  This is an 80-year-old female presenting to the emergency department today for an alleged episode of syncope  The patient syncopized while sitting while eating lunch  The patient has a history of dementia, atrial fibrillation, and aortic stenosis  Case was discussed with nursing home staff  Her vital signs are stable  On physical examination, the patient is baseline confused but alert and oriented to person and place  She has no tenderness to palpation to the bilateral lower extremities and has no focal neurologic deficits on examination  EKG shows atrial fibrillation with a rate of 90; the patient has a longstanding history of atrial fibrillation  Chest x-ray is without acute cardiopulmonary disease  X-ray of the knee and ankle are without acute osseous abnormalities; arthritis noted  I reviewed prior notes, specifically note from yesterday;s ER visit  CT head negative for any acute abnormalities  Negative troponin x1  Based upon syncopal episode and patient's risk factors, will plan for admission for observation with telemetry    The case was discussed with Dr Katelyn Rutherford who accepts patient for observation  The patient and/or patient's proxy verify their understanding and agree to the plan at this time  All questions answered to the patient and/or their proxy's satisfaction  All labs reviewed and utilized in the medical decision making process (if labs were ordered)  Portions of the record may have been created with voice recognition software   Occasional wrong word or "sound a like" substitutions may have occurred due to the inherent limitations of voice recognition software   Read the chart carefully and recognize, using context, where substitutions have occurred  Syncope: complicated acute illness or injury  Amount and/or Complexity of Data Reviewed  Independent Historian: caregiver     Details: Nursing Home Staff  External Data Reviewed: notes  Labs: ordered  Decision-making details documented in ED Course  Radiology: ordered and independent interpretation performed  Decision-making details documented in ED Course  Details: CXR  XR Right Knee  XR Right Ankle  ECG/medicine tests: ordered  Decision-making details documented in ED Course  Discussion of management or test interpretation with external provider(s): Dr Niki Birmingham regarding hospitalization  Disposition  Final diagnoses:   Syncope     Time reflects when diagnosis was documented in both MDM as applicable and the Disposition within this note     Time User Action Codes Description Comment    3/21/2023  1:14 PM Parish Cabrales Add [R55] Syncope       ED Disposition     ED Disposition   Admit    Condition   Stable    Date/Time   Tue Mar 21, 2023  1:14 PM    Comment   Case was discussed with Dr Henrique Aguiar and the patient's admission status was agreed to be Admission Status: observation status to the service of Dr Henrique Aguiar             Follow-up Information    None         Patient's Medications   Discharge Prescriptions    No medications on file       No discharge procedures on file     PDMP Review       Value Time User    PDMP Reviewed  Yes 3/20/2021 10:10 AM FARHAT Turk          ED Provider  Electronically Signed by           Judith Jackson PA-C  03/21/23 2115

## 2023-03-21 NOTE — ASSESSMENT & PLAN NOTE
PT presents w/ 2x episodes of syncope in last 24hrs     Orthostatic vs vasovagal vs cardiac    PLAN  EVAL  24 hour tele  ECHO  TXT  Hold HCTZ and amlodipine  Check INR  Orthostats prior to fluids then x3  Vitals q  shift   TXT  IV NS 1000 ml bolus  Continue lisinopril and metoprolol home medications

## 2023-03-22 ENCOUNTER — APPOINTMENT (OUTPATIENT)
Dept: NON INVASIVE DIAGNOSTICS | Facility: HOSPITAL | Age: 88
End: 2023-03-22

## 2023-03-22 VITALS
BODY MASS INDEX: 28.91 KG/M2 | OXYGEN SATURATION: 97 % | TEMPERATURE: 97.8 F | WEIGHT: 173.5 LBS | RESPIRATION RATE: 20 BRPM | HEART RATE: 90 BPM | HEIGHT: 65 IN | SYSTOLIC BLOOD PRESSURE: 106 MMHG | DIASTOLIC BLOOD PRESSURE: 74 MMHG

## 2023-03-22 LAB
ANION GAP SERPL CALCULATED.3IONS-SCNC: 4 MMOL/L (ref 4–13)
AORTIC ROOT: 3.4 CM
AORTIC VALVE MEAN VELOCITY: 19.1 M/S
APICAL FOUR CHAMBER EJECTION FRACTION: 48 %
ASCENDING AORTA: 3.4 CM
AV AREA BY CONTINUOUS VTI: 0.7 CM2
AV AREA PEAK VELOCITY: 0.8 CM2
AV LVOT MEAN GRADIENT: 1 MMHG
AV LVOT PEAK GRADIENT: 2 MMHG
AV MEAN GRADIENT: 16 MMHG
AV PEAK GRADIENT: 28 MMHG
AV VALVE AREA: 0.73 CM2
AV VELOCITY RATIO: 0.25
BASOPHILS # BLD AUTO: 0.07 THOUSANDS/ÂΜL (ref 0–0.1)
BASOPHILS NFR BLD AUTO: 1 % (ref 0–1)
BUN SERPL-MCNC: 10 MG/DL (ref 5–25)
CALCIUM SERPL-MCNC: 9.4 MG/DL (ref 8.4–10.2)
CHLORIDE SERPL-SCNC: 113 MMOL/L (ref 96–108)
CO2 SERPL-SCNC: 24 MMOL/L (ref 21–32)
CREAT SERPL-MCNC: 0.49 MG/DL (ref 0.6–1.3)
DOP CALC AO PEAK VEL: 2.63 M/S
DOP CALC AO VTI: 57.92 CM
DOP CALC LVOT AREA: 3.14 CM2
DOP CALC LVOT DIAMETER: 2 CM
DOP CALC LVOT PEAK VEL VTI: 13.49 CM
DOP CALC LVOT PEAK VEL: 0.65 M/S
DOP CALC LVOT STROKE INDEX: 22.7 ML/M2
DOP CALC LVOT STROKE VOLUME: 42.36 CM3
E WAVE DECELERATION TIME: 176 MS
EOSINOPHIL # BLD AUTO: 0.28 THOUSAND/ÂΜL (ref 0–0.61)
EOSINOPHIL NFR BLD AUTO: 4 % (ref 0–6)
ERYTHROCYTE [DISTWIDTH] IN BLOOD BY AUTOMATED COUNT: 16.5 % (ref 11.6–15.1)
FRACTIONAL SHORTENING: 26 % (ref 28–44)
GFR SERPL CREATININE-BSD FRML MDRD: 87 ML/MIN/1.73SQ M
GLUCOSE P FAST SERPL-MCNC: 80 MG/DL (ref 65–99)
GLUCOSE SERPL-MCNC: 80 MG/DL (ref 65–140)
HCT VFR BLD AUTO: 40.2 % (ref 34.8–46.1)
HGB BLD-MCNC: 13 G/DL (ref 11.5–15.4)
IMM GRANULOCYTES # BLD AUTO: 0.02 THOUSAND/UL (ref 0–0.2)
IMM GRANULOCYTES NFR BLD AUTO: 0 % (ref 0–2)
INTERVENTRICULAR SEPTUM IN DIASTOLE (PARASTERNAL SHORT AXIS VIEW): 1.5 CM
INTERVENTRICULAR SEPTUM: 1.5 CM (ref 0.6–1.1)
LAAS-AP2: 16.5 CM2
LAAS-AP4: 22.7 CM2
LEFT ATRIUM SIZE: 4.8 CM
LEFT INTERNAL DIMENSION IN SYSTOLE: 2.9 CM (ref 2.1–4)
LEFT VENTRICULAR INTERNAL DIMENSION IN DIASTOLE: 3.9 CM (ref 3.5–6)
LEFT VENTRICULAR POSTERIOR WALL IN END DIASTOLE: 1.3 CM
LEFT VENTRICULAR STROKE VOLUME: 35 ML
LVSV (TEICH): 35 ML
LYMPHOCYTES # BLD AUTO: 2.48 THOUSANDS/ÂΜL (ref 0.6–4.47)
LYMPHOCYTES NFR BLD AUTO: 38 % (ref 14–44)
MCH RBC QN AUTO: 29.6 PG (ref 26.8–34.3)
MCHC RBC AUTO-ENTMCNC: 32.3 G/DL (ref 31.4–37.4)
MCV RBC AUTO: 92 FL (ref 82–98)
MONOCYTES # BLD AUTO: 0.8 THOUSAND/ÂΜL (ref 0.17–1.22)
MONOCYTES NFR BLD AUTO: 12 % (ref 4–12)
MV E'TISSUE VEL-SEP: 10 CM/S
MV PEAK E VEL: 67 CM/S
MV STENOSIS PRESSURE HALF TIME: 51 MS
MV VALVE AREA P 1/2 METHOD: 4.31 CM2
NEUTROPHILS # BLD AUTO: 2.8 THOUSANDS/ÂΜL (ref 1.85–7.62)
NEUTS SEG NFR BLD AUTO: 45 % (ref 43–75)
NRBC BLD AUTO-RTO: 0 /100 WBCS
PLATELET # BLD AUTO: 241 THOUSANDS/UL (ref 149–390)
PMV BLD AUTO: 11.6 FL (ref 8.9–12.7)
POTASSIUM SERPL-SCNC: 4.2 MMOL/L (ref 3.5–5.3)
RA PRESSURE ESTIMATED: 8 MMHG
RBC # BLD AUTO: 4.39 MILLION/UL (ref 3.81–5.12)
RIGHT ATRIUM AREA SYSTOLE A4C: 25 CM2
RV PSP: 30 MMHG
SL CV LEFT ATRIUM LENGTH A2C: 4.5 CM
SL CV LV EF: 55
SL CV PED ECHO LEFT VENTRICLE DIASTOLIC VOLUME (MOD BIPLANE) 2D: 67 ML
SL CV PED ECHO LEFT VENTRICLE SYSTOLIC VOLUME (MOD BIPLANE) 2D: 32 ML
SODIUM SERPL-SCNC: 141 MMOL/L (ref 135–147)
TR MAX PG: 22 MMHG
TR PEAK VELOCITY: 2.3 M/S
TRICUSPID ANNULAR PLANE SYSTOLIC EXCURSION: 1.6 CM
TRICUSPID VALVE PEAK REGURGITATION VELOCITY: 2.34 M/S
WBC # BLD AUTO: 6.45 THOUSAND/UL (ref 4.31–10.16)

## 2023-03-22 RX ADMIN — ATORVASTATIN CALCIUM 20 MG: 20 TABLET, FILM COATED ORAL at 08:48

## 2023-03-22 RX ADMIN — LISINOPRIL 40 MG: 20 TABLET ORAL at 08:48

## 2023-03-22 RX ADMIN — WARFARIN SODIUM 2.5 MG: 2.5 TABLET ORAL at 17:04

## 2023-03-22 RX ADMIN — METOPROLOL TARTRATE 25 MG: 25 TABLET, FILM COATED ORAL at 08:48

## 2023-03-22 RX ADMIN — ESCITALOPRAM 10 MG: 10 TABLET, FILM COATED ORAL at 08:48

## 2023-03-22 NOTE — CASE MANAGEMENT
Case Management Discharge Planning Note    Patient name Heidi Knowles S /S -79 MRN [id number]  : 1935 Date 3/22/2023       Current Admission Date: 3/21/2023  Current Admission Diagnosis:Syncope   Patient Active Problem List    Diagnosis Date Noted   • Syncope 2023   • Fall 2022   • Hyperglycemia 2021   • Essential hypertension 2021   • Persistent atrial fibrillation ([facility] [address] ) 2021   • Mixed hyperlipidemia 2021   • Late onset Alzheimer's disease without behavioral disturbance ([facility] [address] ) 2021   • Hematoma 2021   • Memory deficit 2018   • Aortic stenosis, mild 2017   • Sick sinus syndrome ([facility] [address] ) 2017   • Endometrial hyperplasia 2016   • Hyperlipidemia, mixed 2015   • Atrial fibrillation ([facility] [address] ) 10/01/2014   • Benign essential hypertension 2013   • Kidney carcinoma ([facility] [address] ) 2013      LOS (days): 0  Geometric Mean LOS (GMLOS) (days):   Days to GMLOS:     OBJECTIVE:            Current admission status: Observation   Preferred Pharmacy:   [address] [facility], [place] [address]  Phone: [phone] Fax: [phone]    [facility]/pharmacy #[id number]- [place] [id number] [place] [address]  Phone: [phone] Fax: [phone]    Primary Care Provider: No primary care provider on file  Primary Insurance: MEDICARE  Secondary Insurance: AARP    DISCHARGE DETAILS:    Contacts  Patient Contacts: Jodi at [facility]  Relationship to Patient[de-identified] Treatment Provider  Contact Method: Phone  Phone Number: [phone]  Reason/Outcome: Continuity of Care, Discharge Planning, Referral    Other Referral/Resources/Interventions Provided:  Interventions: Facility Return, Transportation  Referral Comments: CM informed pt stable [facility]    CM confirmed with [facility] Jodi pt able to return, they would just need to know if there are any changes with medications/baseline  Requesting AVS be faxed to f: [phone]  CM requested 5pm WCV pending confirmation of time  SLIM Dr Joshua Rousseau, Dr Abbey Khalil, RN-Michael, and Alla williamson  CM left VM for [facility] regarding p/u time      Treatment Team Recommendation: Facility Return  Discharge Destination Plan[de-identified] Facility Return  Transport at Discharge : Wheelchair van  Dispatcher Contacted: Yes  Number/Name of Dispatcher: Roundtrip     ETA of Transport (Date): 03/22/23  ETA of Transport (Time): 1700 (requested, pending confirmation of time)    Accepting Facility Name, [address] [facility]  Receiving Facility/Agency Phone Number: [phone]  Facility/Agency Fax Number: [phone]

## 2023-03-22 NOTE — PLAN OF CARE
Problem: MOBILITY - ADULT  Goal: Maintain or return to baseline ADL function  Description: INTERVENTIONS:  -  Assess patient's ability to carry out ADLs; assess patient's baseline for ADL function and identify physical deficits which impact ability to perform ADLs (bathing, care of mouth/teeth, toileting, grooming, dressing, etc )  - Assess/evaluate cause of self-care deficits   - Assess range of motion  - Assess patient's mobility; develop plan if impaired  - Assess patient's need for assistive devices and provide as appropriate  - Encourage maximum independence but intervene and supervise when necessary  - Involve family in performance of ADLs  - Assess for home care needs following discharge   - Consider OT consult to assist with ADL evaluation and planning for discharge  - Provide patient education as appropriate  Outcome: Progressing  Goal: Maintains/Returns to pre admission functional level  Description: INTERVENTIONS:  - Perform BMAT or MOVE assessment daily    - Set and communicate daily mobility goal to care team and patient/family/caregiver  - Collaborate with rehabilitation services on mobility goals if consulted  - Perform Range of Motion 2 times a day  - Reposition patient every 2 hours    - Dangle patient 2 times a day  - Stand patient 2 times a day  - Ambulate patient 2 times a day  - Out of bed to chair 2 times a day   - Out of bed for meals 2 times a day  - Out of bed for toileting  - Record patient progress and toleration of activity level   Outcome: Progressing     Problem: NEUROSENSORY - ADULT  Goal: Achieves stable or improved neurological status  Description: INTERVENTIONS  - Monitor and report changes in neurological status  - Monitor vital signs such as temperature, blood pressure, glucose, and any other labs ordered   - Initiate measures to prevent increased intracranial pressure  - Monitor for seizure activity and implement precautions if appropriate      Outcome: Progressing  Goal: Achieves maximal functionality and self care  Description: INTERVENTIONS  - Monitor swallowing and airway patency with patient fatigue and changes in neurological status  - Encourage and assist patient to increase activity and self care     - Encourage visually impaired, hearing impaired and aphasic patients to use assistive/communication devices  Outcome: Progressing

## 2023-03-22 NOTE — PLAN OF CARE
Problem: MOBILITY - ADULT  Goal: Maintain or return to baseline ADL function  Description: INTERVENTIONS:  -  Assess patient's ability to carry out ADLs; assess patient's baseline for ADL function and identify physical deficits which impact ability to perform ADLs (bathing, care of mouth/teeth, toileting, grooming, dressing, etc )  - Assess/evaluate cause of self-care deficits   - Assess range of motion  - Assess patient's mobility; develop plan if impaired  - Assess patient's need for assistive devices and provide as appropriate  - Encourage maximum independence but intervene and supervise when necessary  - Involve family in performance of ADLs  - Assess for home care needs following discharge   - Consider OT consult to assist with ADL evaluation and planning for discharge  - Provide patient education as appropriate  Outcome: Progressing  Goal: Maintains/Returns to pre admission functional level  Description: INTERVENTIONS:  - Perform BMAT or MOVE assessment daily    - Set and communicate daily mobility goal to care team and patient/family/caregiver  - Collaborate with rehabilitation services on mobility goals if consulted  - Out of bed for toileting  - Record patient progress and toleration of activity level   Outcome: Progressing     Problem: NEUROSENSORY - ADULT  Goal: Achieves stable or improved neurological status  Description: INTERVENTIONS  - Monitor and report changes in neurological status  - Monitor vital signs such as temperature, blood pressure, glucose, and any other labs ordered   - Initiate measures to prevent increased intracranial pressure  - Monitor for seizure activity and implement precautions if appropriate      Outcome: Progressing  Goal: Achieves maximal functionality and self care  Description: INTERVENTIONS  - Monitor swallowing and airway patency with patient fatigue and changes in neurological status  - Encourage and assist patient to increase activity and self care     - Encourage visually impaired, hearing impaired and aphasic patients to use assistive/communication devices  Outcome: Progressing     Problem: Prexisting or High Potential for Compromised Skin Integrity  Goal: Skin integrity is maintained or improved  Description: INTERVENTIONS:  - Identify patients at risk for skin breakdown  - Assess and monitor skin integrity  - Assess and monitor nutrition and hydration status  - Monitor labs   - Assess for incontinence   - Turn and reposition patient  - Assist with mobility/ambulation  - Relieve pressure over bony prominences  - Avoid friction and shearing  - Provide appropriate hygiene as needed including keeping skin clean and dry  - Evaluate need for skin moisturizer/barrier cream  - Collaborate with interdisciplinary team   - Patient/family teaching  - Consider wound care consult   Outcome: Progressing

## 2023-03-22 NOTE — DISCHARGE SUMMARY
[facility]  Discharge- Anisa Innocent 1935, 80 y o  female MRN: [id number]  Unit/Bed#: S -01 Encounter: [id number]  Primary Care Provider: No primary care provider on file  Date and time admitted to hospital: 3/21/2023 11:04 AM    * Syncope  Assessment & Plan  PT presents w/ 2x episodes of syncope in last 24hrs  Since admission pt has returned to baseline mental status without further episodes of syncope  Suspect postprandial hypotension  PLAN  Continue home medications and discharge to previous facility        Late onset Alzheimer's disease without behavioral disturbance [facility])  Assessment & Plan  Present on admission    PLAN:  Continue home medications  Sick sinus syndrome [facility])  Assessment & Plan  Present on admission    PLAN:  Continue home medications  Hyperlipidemia, mixed  Assessment & Plan  Present on admission    PLAN:  Continue home medications  Benign essential hypertension  Assessment & Plan  Blood Pressure: 106/74      Present on admission    PLAN:  Continue home medications  Atrial fibrillation ([address] )  Assessment & Plan  Present on admission  Currently a fib rate controlled 90 bpm    PLAN  Continue amlodipine and metoprolol home txt    Aortic stenosis, mild  Assessment & Plan  Last ECHO [id number]    PLAN:  Repeat ECHO-pending on discharge        Discharging Resident Physician: Kleber Mac MD  Attending: Chikis Bravo MD  PCP: No primary care provider on file  Admission Date: 3/21/2023  Discharge Date: 03/22/23    Disposition:     [address] at [facility] in [place]    Reason for Admission: Syncope    Consultations During Hospital Stay:  · None    Procedures Performed:     · Echo    Significant Findings / Test Results:     · None    Incidental Findings:   · None     Test Results Pending at Discharge (will require follow up):    · Echo results     Outpatient Tests Requested:  · None    Complications:  None    Hospital Course:     Julian Kwan is a 80 y o  female patient who originally presented to the hospital on 3/21/2023 due to a syncopal event while eating lunch at her assisted living facility  She was [facility] the day PTA for a similar episode  Per nursing home staff, the patient had an episode of unresponsiveness at a table while she was sitting earlier  The patient allegedly slid off the chair but did not strike her head  Episode was witnessed  There was no associated seizure-like activity  The patient complained only of right ankle pain but no other symptoms  She was admitted under observation for this syncopal event  During her admission pt remained hemodynamically stable and at her mental baseline  She did not have a recurrent episode of syncope  Workup was negative for acute CVA  Labs were unremarkable  She was discharged the following day to her previous nursing facility  Condition at Discharge: stable     Discharge Day Visit / Exam:     Subjective:  Pt pleasantly confused this morning  Denies pain, dizziness, N/V, SOB  Oriented only to self  Vitals: Blood Pressure: 106/74 (03/22/23 1500)  Pulse: 90 (03/22/23 1500)  Temperature: 97 8 °F (36 6 °C) (03/22/23 1500)  Temp Source: Oral (03/22/23 1500)  Respirations: 20 (03/22/23 0717)  Height: 5' 4 5" (163 8 cm) (03/22/23 1400)  Weight - Scale: 78 7 kg (173 lb 8 oz) (03/22/23 1400)  SpO2: 97 % (03/22/23 1500)     Exam:   Physical Exam  Constitutional:       General: She is not in acute distress  Appearance: Normal appearance  She is obese  She is ill-appearing (Chronically ill-appearing)  HENT:      Head: Normocephalic and atraumatic  Right Ear: Tympanic membrane and external ear normal       Left Ear: Tympanic membrane and external ear normal       Mouth/Throat:      Mouth: Mucous membranes are dry  Pharynx: Oropharynx is clear  No oropharyngeal exudate  Eyes:      Extraocular Movements: Extraocular movements intact        Pupils: Pupils are equal, round, and reactive to light  Cardiovascular:      Rate and Rhythm: Normal rate and regular rhythm  Pulses: Normal pulses  Heart sounds: Murmur heard  Pulmonary:      Effort: Pulmonary effort is normal       Breath sounds: Normal breath sounds  No wheezing, rhonchi or rales  Abdominal:      General: Abdomen is flat  Bowel sounds are normal  There is no distension  Palpations: Abdomen is soft  Tenderness: There is no abdominal tenderness  Skin:     General: Skin is warm and dry  Capillary Refill: Capillary refill takes less than 2 seconds  Neurological:      General: No focal deficit present  Mental Status: She is alert and oriented to person, place, and time  Psychiatric:         Mood and Affect: Mood normal          Behavior: Behavior normal          Discussion with Family: Nikko Johnson    Discharge instructions/Information to patient and family:   See after visit summary for information provided to patient and family  Provisions for Follow-Up Care:  See after visit summary for information related to follow-up care and any pertinent home health orders  Planned Readmission: No     Discharge Medications:  See after visit summary for reconciled discharge medications provided to patient and family        ** Please Note: This note has been constructed using a voice recognition system **

## 2023-03-22 NOTE — ASSESSMENT & PLAN NOTE
PT presents w/ 2x episodes of syncope in last 24hrs  Since admission pt has returned to baseline mental status without further episodes of syncope  Suspect postprandial hypotension      PLAN  Continue home medications and discharge to previous facility

## 2023-03-22 NOTE — PLAN OF CARE
Problem: MOBILITY - ADULT  Goal: Maintain or return to baseline ADL function  Description: INTERVENTIONS:  -  Assess patient's ability to carry out ADLs; assess patient's baseline for ADL function and identify physical deficits which impact ability to perform ADLs (bathing, care of mouth/teeth, toileting, grooming, dressing, etc )  - Assess/evaluate cause of self-care deficits   - Assess range of motion  - Assess patient's mobility; develop plan if impaired  - Assess patient's need for assistive devices and provide as appropriate  - Encourage maximum independence but intervene and supervise when necessary  - Involve family in performance of ADLs  - Assess for home care needs following discharge   - Consider OT consult to assist with ADL evaluation and planning for discharge  - Provide patient education as appropriate  3/22/2023 1527 by Samantha Paul RN  Outcome: Adequate for Discharge  3/22/2023 1016 by Samantha Paul RN  Outcome: Progressing  Goal: Maintains/Returns to pre admission functional level  Description: INTERVENTIONS:  - Perform BMAT or MOVE assessment daily    - Set and communicate daily mobility goal to care team and patient/family/caregiver     - Collaborate with rehabilitation services on mobility goals if consulted  - Out of bed for toileting  - Record patient progress and toleration of activity level   3/22/2023 1527 by Samantha Paul RN  Outcome: Adequate for Discharge  3/22/2023 1016 by Samantha Paul RN  Outcome: Progressing     Problem: NEUROSENSORY - ADULT  Goal: Achieves stable or improved neurological status  Description: INTERVENTIONS  - Monitor and report changes in neurological status  - Monitor vital signs such as temperature, blood pressure, glucose, and any other labs ordered   - Initiate measures to prevent increased intracranial pressure  - Monitor for seizure activity and implement precautions if appropriate      3/22/2023 1527 by Samantha Paul RN  Outcome: Adequate for Discharge  3/22/2023 1016 by Danny Chen RN  Outcome: Progressing  Goal: Achieves maximal functionality and self care  Description: INTERVENTIONS  - Monitor swallowing and airway patency with patient fatigue and changes in neurological status  - Encourage and assist patient to increase activity and self care     - Encourage visually impaired, hearing impaired and aphasic patients to use assistive/communication devices  3/22/2023 1527 by Danny Chen RN  Outcome: Adequate for Discharge  3/22/2023 1016 by Danny Chen RN  Outcome: Progressing     Problem: Prexisting or High Potential for Compromised Skin Integrity  Goal: Skin integrity is maintained or improved  Description: INTERVENTIONS:  - Identify patients at risk for skin breakdown  - Assess and monitor skin integrity  - Assess and monitor nutrition and hydration status  - Monitor labs   - Assess for incontinence   - Turn and reposition patient  - Assist with mobility/ambulation  - Relieve pressure over bony prominences  - Avoid friction and shearing  - Provide appropriate hygiene as needed including keeping skin clean and dry  - Evaluate need for skin moisturizer/barrier cream  - Collaborate with interdisciplinary team   - Patient/family teaching  - Consider wound care consult   3/22/2023 1527 by Danny Chen RN  Outcome: Adequate for Discharge  3/22/2023 1016 by Danny Chen RN  Outcome: Progressing

## 2023-03-22 NOTE — QUICK NOTE
Called pt's niece, Klever Salomon, to provide update  All questions were answered to her satisfaction

## 2023-03-27 LAB
ATRIAL RATE: 101 BPM
ATRIAL RATE: 75 BPM
QRS AXIS: 41 DEGREES
QRS AXIS: 44 DEGREES
QRSD INTERVAL: 92 MS
QRSD INTERVAL: 92 MS
QT INTERVAL: 366 MS
QT INTERVAL: 398 MS
QTC INTERVAL: 447 MS
QTC INTERVAL: 459 MS
T WAVE AXIS: -7 DEGREES
T WAVE AXIS: 30 DEGREES
VENTRICULAR RATE: 80 BPM
VENTRICULAR RATE: 90 BPM

## 2023-04-06 ENCOUNTER — NURSING HOME VISIT (OUTPATIENT)
Dept: FAMILY MEDICINE CLINIC | Facility: CLINIC | Age: 88
End: 2023-04-06
Payer: MEDICARE

## 2023-04-06 DIAGNOSIS — C64.9 CARCINOMA OF KIDNEY, UNSPECIFIED LATERALITY (HCC): ICD-10-CM

## 2023-04-06 DIAGNOSIS — G30.1 LATE ONSET ALZHEIMER'S DISEASE WITHOUT BEHAVIORAL DISTURBANCE (HCC): ICD-10-CM

## 2023-04-06 DIAGNOSIS — Z00.00 WELL ADULT EXAM: Primary | ICD-10-CM

## 2023-04-06 DIAGNOSIS — I10 BENIGN ESSENTIAL HYPERTENSION: ICD-10-CM

## 2023-04-06 DIAGNOSIS — I49.5 SICK SINUS SYNDROME (HCC): ICD-10-CM

## 2023-04-06 DIAGNOSIS — E78.2 HYPERLIPIDEMIA, MIXED: ICD-10-CM

## 2023-04-06 DIAGNOSIS — F32.4 MAJOR DEPRESSIVE DISORDER WITH SINGLE EPISODE, IN PARTIAL REMISSION (HCC): ICD-10-CM

## 2023-04-06 DIAGNOSIS — I48.21 PERMANENT ATRIAL FIBRILLATION (HCC): ICD-10-CM

## 2023-04-06 DIAGNOSIS — F02.80 LATE ONSET ALZHEIMER'S DISEASE WITHOUT BEHAVIORAL DISTURBANCE (HCC): ICD-10-CM

## 2023-04-06 DIAGNOSIS — E78.2 MIXED HYPERLIPIDEMIA: ICD-10-CM

## 2023-04-06 PROCEDURE — 99350 HOME/RES VST EST HIGH MDM 60: CPT | Performed by: FAMILY MEDICINE

## 2023-04-26 DIAGNOSIS — R21 RASH: ICD-10-CM

## 2023-04-26 RX ORDER — CLOTRIMAZOLE AND BETAMETHASONE DIPROPIONATE 10; .64 MG/G; MG/G
CREAM TOPICAL
Qty: 60 G | Refills: 2 | Status: SHIPPED | OUTPATIENT
Start: 2023-04-26

## 2023-06-25 VITALS
SYSTOLIC BLOOD PRESSURE: 134 MMHG | BODY MASS INDEX: 29.53 KG/M2 | WEIGHT: 173 LBS | TEMPERATURE: 99.4 F | DIASTOLIC BLOOD PRESSURE: 82 MMHG | RESPIRATION RATE: 16 BRPM | HEIGHT: 64 IN | HEART RATE: 84 BPM

## 2023-06-25 PROBLEM — F32.4 MAJOR DEPRESSIVE DISORDER WITH SINGLE EPISODE, IN PARTIAL REMISSION (HCC): Status: ACTIVE | Noted: 2023-06-25

## 2023-06-26 NOTE — PROGRESS NOTES
Assessment/Plan:         Problem List Items Addressed This Visit        Cardiovascular and Mediastinum    Atrial fibrillation ([facility] )     Continue with anticogulation and rate control of heart rate  Concerns about condition were addressed today  Benign essential hypertension     Patient is stable with current anti-hypertensive medicine and continue to follow a low sodium diet and take current medication  All questions about this condition were answered today  Sick sinus syndrome [facility])     Patient is stable  and will continue present plan of care and reassess at next routine visit  All questions about this problem from patient were answered today  Nervous and Auditory    Late onset Alzheimer's disease without behavioral disturbance ([facility] )     Patient has severe dementia and is not able to care for self at all and needs heavy care  Pt needs a lot of help with their ADLs by staff  Genitourinary    Kidney carcinoma [facility])     Patient is stable  and will continue present plan of care and reassess at next routine visit  All questions about this problem from patient were answered today  Other    Hyperlipidemia, mixed     Patient  is stable with current medication and we discussed a low fat low cholesterol diet  Weight loss also discussed for this will help lower cholesterol also  Recheck lipids in 6 months  Mixed hyperlipidemia     Patient  is stable with current medication and we discussed a low fat low cholesterol diet  Weight loss also discussed for this will help lower cholesterol also  Recheck lipids in 6 months  Major depressive disorder with single episode, in partial remission [facility])     Patient to continue utilizing medical therapy as well and counseling sources as applicable for condition  If  suicidal thought or fear of suicide to contact 911 and seek help immediately   Meds reviewed and patient questions answered today        Other Visit Diagnoses Well adult exam    -  Primary            Subjective:      Patient ID: Deanna Maria is a 80 y o  female  This is an 77-year-old female seen and examined assisted living facility for her yearly H&P  Patient MA 51 also filled out today  Patient multimedical patient medical problems include dementia that is getting worse hypertension hyperlipidemia atrial fibrillation gait dysfunction depression  Patient had patient's dementia has been slowly getting worse over the years that she has been here  The patient is now keeping herself very unkempt and needs a lot more care that they can provide for her  The facility is active looking for her to go to a nursing home because she is now needing that level of care  Her medicines have been reviewed and renewed today  Patient is eating well has dementia but is not agitated  Patient also sleeps well  The following portions of the patient's history were reviewed and updated as appropriate:   Past Medical History:  She has a past medical history of Essential hypertension (4/11/2021), Late onset Alzheimer's disease without behavioral disturbance ([facility] ) (4/11/2021), Mixed hyperlipidemia (4/11/2021), and Persistent atrial fibrillation ([facility] ) (4/11/2021)  ,  _______________________________________________________________________  Medical Problems:  does not have any pertinent problems on file ,  _______________________________________________________________________  Past Surgical History:   has a past surgical history that includes Hiatal hernia repair; Eye surgery; Renal mass excision (Right); Tonsillectomy; Cataract extraction, bilateral (Bilateral);  Appendectomy; and TONSILECTOMY AND ADNOIDECTOMY ,  _______________________________________________________________________  Family History:  family history includes Dementia in her mother; Heart disease in her brother ,  _______________________________________________________________________  Social History:   reports that "she has never smoked  She has never used smokeless tobacco  She reports that she does not currently use alcohol  She reports that she does not use drugs  ,  _______________________________________________________________________  Allergies:  has No Known Allergies     _______________________________________________________________________  Current Outpatient Medications   Medication Sig Dispense Refill   • acetaminophen (TYLENOL) 325 mg tablet TAKE ONE TABLET BY MOUTH EVERY FOUR HOURS AS NEEDED FOR PAIN / TEMP 60 tablet 3   • amLODIPine (NORVASC) 5 mg tablet TAKE ONE TABLET BY MOUTH DAILY IN THE MORNING DX  HTN     • atorvastatin (LIPITOR) 20 mg tablet Take 1 tablet (20 mg total) by mouth daily 30 tablet 5   • clotrimazole-betamethasone (LOTRISONE) 1-0 05 % cream APPLY TO RASH TWICE DAILY AS NEEDED DX: RASH 60 g 2   • cyanocobalamin (VITAMIN B-12) 1,000 mcg tablet Take 1 tablet (1,000 mcg total) by mouth daily 30 tablet 5   • donepezil (ARICEPT) 10 mg tablet Take 1 tablet (10 mg total) by mouth daily at bedtime 30 tablet 5   • escitalopram (LEXAPRO) 10 mg tablet TAKE ONE TABLET BY MOUTH DAILY DX:MOOD 30 tablet 11   • hydrochlorothiazide (HYDRODIURIL) 12 5 mg tablet TAKE ONE TABLET BY MOUTH DAILY IN THE MORNING HTN 30 tablet 11   • lisinopril (ZESTRIL) 40 mg tablet TAKE ONE TABLET BY MOUTH DAILY DX: HTN 30 tablet 11   • metoprolol tartrate (LOPRESSOR) 25 mg tablet Take 1 tablet (25 mg total) by mouth 2 (two) times a day 60 tablet 5   • POTASSIUM CHLORIDE ER PO Take 20 mEq by mouth daily     • warfarin (COUMADIN) 3 mg tablet Take by mouth daily       No current facility-administered medications for this visit      _______________________________________________________________________  Review of Systems   Unable to perform ROS: Dementia         Objective:  Vitals:    04/06/23 2100   BP: 134/82   Pulse: 84   Resp: 16   Temp: 99 4 °F (37 4 °C)   Weight: 78 5 kg (173 lb)   Height: 5' 4\" (1 626 m)     Body mass index is " 29 7 kg/m²  Physical Exam  Vitals and nursing note reviewed  Constitutional:       Appearance: She is well-developed  She is obese  HENT:      Head: Normocephalic and atraumatic  Right Ear: Tympanic membrane, ear canal and external ear normal  There is no impacted cerumen  Left Ear: Tympanic membrane, ear canal and external ear normal  There is no impacted cerumen  Nose: Nose normal    Eyes:      General: No scleral icterus  Conjunctiva/sclera: Conjunctivae normal       Pupils: Pupils are equal, round, and reactive to light  Neck:      Thyroid: No thyromegaly  Cardiovascular:      Rate and Rhythm: Normal rate  Rhythm irregular  Pulmonary:      Effort: Pulmonary effort is normal       Breath sounds: Normal breath sounds  No wheezing  Abdominal:      General: Bowel sounds are normal  There is no distension  Palpations: Abdomen is soft  Tenderness: There is no abdominal tenderness  There is no guarding or rebound  Musculoskeletal:         General: No tenderness or deformity  Normal range of motion  Cervical back: Normal range of motion and neck supple  Right lower leg: Edema present  Left lower leg: Edema present  Skin:     General: Skin is warm and dry  Findings: No erythema or rash  Neurological:      Mental Status: She is alert  Mental status is at baseline  She is disoriented  Sensory: No sensory deficit     Psychiatric:         Behavior: Behavior normal

## 2023-07-31 DIAGNOSIS — R21 RASH: ICD-10-CM

## 2023-08-02 DIAGNOSIS — R21 RASH: ICD-10-CM

## 2023-08-02 RX ORDER — CLOTRIMAZOLE AND BETAMETHASONE DIPROPIONATE 10; .64 MG/G; MG/G
CREAM TOPICAL
Qty: 60 G | Refills: 2 | OUTPATIENT
Start: 2023-08-02

## 2023-08-19 DIAGNOSIS — E78.5 DYSLIPIDEMIA: ICD-10-CM

## 2023-08-19 DIAGNOSIS — I48.91 ATRIAL FIBRILLATION, UNSPECIFIED TYPE (HCC): ICD-10-CM

## 2023-08-19 DIAGNOSIS — R41.3 MEMORY DEFICIT: ICD-10-CM

## 2023-08-19 DIAGNOSIS — I10 BENIGN ESSENTIAL HYPERTENSION: ICD-10-CM

## 2023-08-19 RX ORDER — WARFARIN SODIUM 3 MG/1
TABLET ORAL
Qty: 30 TABLET | OUTPATIENT
Start: 2023-08-19

## 2023-08-19 RX ORDER — DONEPEZIL HYDROCHLORIDE 10 MG/1
TABLET, FILM COATED ORAL
Qty: 30 TABLET | Refills: 5 | OUTPATIENT
Start: 2023-08-19

## 2023-08-19 RX ORDER — POTASSIUM CHLORIDE 20 MEQ/1
TABLET, EXTENDED RELEASE ORAL
Qty: 30 TABLET | OUTPATIENT
Start: 2023-08-19

## 2023-08-19 RX ORDER — ATORVASTATIN CALCIUM 20 MG/1
TABLET, FILM COATED ORAL
Qty: 30 TABLET | Refills: 5 | OUTPATIENT
Start: 2023-08-19

## 2023-08-19 RX ORDER — AMLODIPINE BESYLATE 5 MG/1
TABLET ORAL
Qty: 30 TABLET | OUTPATIENT
Start: 2023-08-19

## 2023-10-13 DIAGNOSIS — I10 ESSENTIAL HYPERTENSION: ICD-10-CM

## 2023-10-13 RX ORDER — HYDROCHLOROTHIAZIDE 12.5 MG/1
TABLET ORAL
Qty: 30 TABLET | Refills: 11 | Status: SHIPPED | OUTPATIENT
Start: 2023-10-13

## 2023-10-13 RX ORDER — LISINOPRIL 40 MG/1
TABLET ORAL
Qty: 30 TABLET | Refills: 11 | Status: SHIPPED | OUTPATIENT
Start: 2023-10-13

## 2024-05-25 NOTE — ASSESSMENT & PLAN NOTE
Home medications include Norvasc 5 mg daily, lisinopril-HCTZ 20/12 5 daily, Lopressor 25 b i d      Plan  · Continue home regimen  · Monitor BP closely No